# Patient Record
Sex: FEMALE | Race: WHITE | HISPANIC OR LATINO | Employment: FULL TIME | URBAN - METROPOLITAN AREA
[De-identification: names, ages, dates, MRNs, and addresses within clinical notes are randomized per-mention and may not be internally consistent; named-entity substitution may affect disease eponyms.]

---

## 2017-01-13 ENCOUNTER — OFFICE VISIT (OUTPATIENT)
Dept: INTERNAL MEDICINE | Facility: CLINIC | Age: 42
End: 2017-01-13
Attending: INTERNAL MEDICINE
Payer: COMMERCIAL

## 2017-01-13 VITALS
OXYGEN SATURATION: 99 % | DIASTOLIC BLOOD PRESSURE: 82 MMHG | HEIGHT: 60 IN | WEIGHT: 170.63 LBS | HEART RATE: 72 BPM | SYSTOLIC BLOOD PRESSURE: 118 MMHG | BODY MASS INDEX: 33.5 KG/M2

## 2017-01-13 DIAGNOSIS — E28.2 PCO (POLYCYSTIC OVARIES): ICD-10-CM

## 2017-01-13 DIAGNOSIS — R53.83 FATIGUE, UNSPECIFIED TYPE: ICD-10-CM

## 2017-01-13 DIAGNOSIS — E66.9 OBESITY (BMI 30.0-34.9): ICD-10-CM

## 2017-01-13 DIAGNOSIS — Z00.00 ANNUAL PHYSICAL EXAM: Primary | ICD-10-CM

## 2017-01-13 DIAGNOSIS — Z78.9 VEGETARIAN: ICD-10-CM

## 2017-01-13 DIAGNOSIS — D64.9 ANEMIA, UNSPECIFIED TYPE: ICD-10-CM

## 2017-01-13 DIAGNOSIS — M34.9 SCLERODERMA: ICD-10-CM

## 2017-01-13 PROCEDURE — 99999 PR PBB SHADOW E&M-EST. PATIENT-LVL III: CPT | Mod: PBBFAC,,, | Performed by: INTERNAL MEDICINE

## 2017-01-13 PROCEDURE — 99385 PREV VISIT NEW AGE 18-39: CPT | Mod: S$GLB,,, | Performed by: INTERNAL MEDICINE

## 2017-01-13 NOTE — PROGRESS NOTES
Subjective:       Patient ID: Liliana Wright is a 41 y.o. female.    Chief Complaint: Annual Exam    HPI   Pt here for annual exam and to [facility] care.     Reports inc stress from job and at home. Reports increased fatigue over past 3-6 months which she attributes to stress. She started taking b12 SL - dose unknown and iron once daily for fatigue - reports this has helped since started 1 week ago. Has also lost 10 pounds over the past month by healthier eating. She is a vegetarian and was worried her diet was lacking in B12 and iron.   Reports is having periods - these are regular.  Hx of anemia - Reports had blood transfusion in past after lost baby when pregnant.   Hx of scleroderma - followed by derm Dr. Stoddard for this.   Gyn is Dr. Jade    Review of Systems   Constitutional: Positive for fatigue. Negative for chills and fever.   HENT: Negative for rhinorrhea and sore throat.    Eyes: Negative for pain and visual disturbance.   Respiratory: Negative for cough and shortness of breath.    Cardiovascular: Negative for chest pain and leg swelling.   Gastrointestinal: Negative for abdominal pain and diarrhea.   Endocrine: Negative for cold intolerance and heat intolerance.   Genitourinary: Negative for dysuria and hematuria.   Musculoskeletal: Negative for arthralgias and joint swelling.   Skin: Negative for color change and rash.   Neurological: Negative for dizziness and headaches.   Hematological: Negative for adenopathy. Does not bruise/bleed easily.   Psychiatric/Behavioral: Negative for sleep disturbance. The patient is not nervous/anxious.        Objective:      Physical Exam   Constitutional: She is oriented to person, place, and time. She appears well-developed and well-nourished.   HENT:   Head: Normocephalic and atraumatic.   Right Ear: External ear normal.   Left Ear: External ear normal.   Nose: Nose normal.   Mouth/Throat: Oropharynx is clear and moist. No oropharyngeal exudate.   No carotid bruits   Eyes:  Conjunctivae and EOM are normal.   Neck: Neck supple. No thyromegaly present.   Cardiovascular: Normal rate, regular rhythm, normal heart sounds and intact distal pulses.    Pulmonary/Chest: Effort normal and breath sounds normal.   Abdominal: Soft. Bowel sounds are normal.   Musculoskeletal: She exhibits no edema or tenderness.   Lymphadenopathy:     She has no cervical adenopathy.   Neurological: She is alert and oriented to person, place, and time. Coordination normal.   Skin: Skin is warm and dry.   Psychiatric: She has a normal mood and affect. Her behavior is normal. Judgment and thought content normal.       Assessment:       Annual physical exam  -     CBC auto differential; Future; Expected date: 1/13/17  -     Comprehensive metabolic panel; Future; Expected date: 1/13/17  -     TSH; Future; Expected date: 1/13/17  -     Lipid panel; Future; Expected date: 1/13/17  -     Hemoglobin A1c; Future; Expected date: 1/13/17  Recommend daily sunscreen, cardiovascular exercise min 30 min 5 days per week. Seatbelts routinely.    PCO (polycystic ovaries): followed by gyn  -     Hemoglobin A1c; Future; Expected date: 1/13/17    Scleroderma: stable, followed by derm    Obesity (BMI 30.0-34.9):   - cont diet and exercise  - increase intensity and duration of CV exercise to continue weight loss  - goal wt loss one pound per week  - portion control, healthy choices    Anemia, unspecified type: check labs - is vegetarian. Will cont b12 and iron supplements she started 1 week ago for now  -     Ferritin; Future; Expected date: 1/13/17  -     Iron and TIBC; Future; Expected date: 1/13/17  -     Folate; Future; Expected date: 1/13/17  -     Vitamin B12; Future; Expected date: 1/13/17    Fatigue, unspecified type: rec good sleep hygiene and graded exercise program. If does not improve and labs unremarkable will consider further eval by sleep  -     Vitamin D; Future; Expected date: 1/13/17    Vegetarian; as above  HM: declines  vaccines

## 2017-01-14 ENCOUNTER — LAB VISIT (OUTPATIENT)
Dept: LAB | Facility: HOSPITAL | Age: 42
End: 2017-01-14
Attending: INTERNAL MEDICINE
Payer: COMMERCIAL

## 2017-01-14 DIAGNOSIS — Z00.00 ANNUAL PHYSICAL EXAM: ICD-10-CM

## 2017-01-14 DIAGNOSIS — D64.9 ANEMIA, UNSPECIFIED TYPE: ICD-10-CM

## 2017-01-14 DIAGNOSIS — E28.2 PCO (POLYCYSTIC OVARIES): ICD-10-CM

## 2017-01-14 DIAGNOSIS — R53.83 FATIGUE, UNSPECIFIED TYPE: ICD-10-CM

## 2017-01-14 LAB
ALBUMIN SERPL BCP-MCNC: 4.1 G/DL
ALP SERPL-CCNC: 81 U/L
ALT SERPL W/O P-5'-P-CCNC: 47 U/L
ANION GAP SERPL CALC-SCNC: 9 MMOL/L
AST SERPL-CCNC: 33 U/L
BASOPHILS # BLD AUTO: 0.01 K/UL
BASOPHILS NFR BLD: 0.2 %
BILIRUB SERPL-MCNC: 0.4 MG/DL
BUN SERPL-MCNC: 7 MG/DL
CALCIUM SERPL-MCNC: 9.7 MG/DL
CHLORIDE SERPL-SCNC: 109 MMOL/L
CHOLEST/HDLC SERPL: 5.7 {RATIO}
CO2 SERPL-SCNC: 23 MMOL/L
CREAT SERPL-MCNC: 0.8 MG/DL
DIFFERENTIAL METHOD: ABNORMAL
EOSINOPHIL # BLD AUTO: 0 K/UL
EOSINOPHIL NFR BLD: 0.8 %
ERYTHROCYTE [DISTWIDTH] IN BLOOD BY AUTOMATED COUNT: 14.3 %
EST. GFR  (AFRICAN AMERICAN): >60 ML/MIN/1.73 M^2
EST. GFR  (NON AFRICAN AMERICAN): >60 ML/MIN/1.73 M^2
GLUCOSE SERPL-MCNC: 91 MG/DL
HCT VFR BLD AUTO: 38.5 %
HDL/CHOLESTEROL RATIO: 17.5 %
HDLC SERPL-MCNC: 189 MG/DL
HDLC SERPL-MCNC: 33 MG/DL
HGB BLD-MCNC: 12.4 G/DL
IRON SERPL-MCNC: 63 UG/DL
LDLC SERPL CALC-MCNC: 128.4 MG/DL
LYMPHOCYTES # BLD AUTO: 1.8 K/UL
LYMPHOCYTES NFR BLD: 36.1 %
MCH RBC QN AUTO: 26.9 PG
MCHC RBC AUTO-ENTMCNC: 32.2 %
MCV RBC AUTO: 84 FL
MONOCYTES # BLD AUTO: 0.2 K/UL
MONOCYTES NFR BLD: 3.6 %
NEUTROPHILS # BLD AUTO: 3 K/UL
NEUTROPHILS NFR BLD: 59.3 %
NONHDLC SERPL-MCNC: 156 MG/DL
PLATELET # BLD AUTO: 252 K/UL
PMV BLD AUTO: 10.2 FL
POTASSIUM SERPL-SCNC: 4.3 MMOL/L
PROT SERPL-MCNC: 7.6 G/DL
RBC # BLD AUTO: 4.61 M/UL
SATURATED IRON: 18 %
SODIUM SERPL-SCNC: 141 MMOL/L
TOTAL IRON BINDING CAPACITY: 342 UG/DL
TRANSFERRIN SERPL-MCNC: 231 MG/DL
TRIGL SERPL-MCNC: 138 MG/DL
TSH SERPL DL<=0.005 MIU/L-ACNC: 0.41 UIU/ML
WBC # BLD AUTO: 5.07 K/UL

## 2017-01-14 PROCEDURE — 82746 ASSAY OF FOLIC ACID SERUM: CPT

## 2017-01-14 PROCEDURE — 82306 VITAMIN D 25 HYDROXY: CPT

## 2017-01-14 PROCEDURE — 82607 VITAMIN B-12: CPT

## 2017-01-14 PROCEDURE — 83540 ASSAY OF IRON: CPT

## 2017-01-14 PROCEDURE — 84443 ASSAY THYROID STIM HORMONE: CPT

## 2017-01-14 PROCEDURE — 80061 LIPID PANEL: CPT

## 2017-01-14 PROCEDURE — 36415 COLL VENOUS BLD VENIPUNCTURE: CPT

## 2017-01-14 PROCEDURE — 80053 COMPREHEN METABOLIC PANEL: CPT

## 2017-01-14 PROCEDURE — 82728 ASSAY OF FERRITIN: CPT

## 2017-01-14 PROCEDURE — 83036 HEMOGLOBIN GLYCOSYLATED A1C: CPT

## 2017-01-14 PROCEDURE — 85025 COMPLETE CBC W/AUTO DIFF WBC: CPT

## 2017-01-15 LAB
25(OH)D3+25(OH)D2 SERPL-MCNC: 14 NG/ML
FOLATE SERPL-MCNC: 13.4 NG/ML
VIT B12 SERPL-MCNC: 611 PG/ML

## 2017-01-16 LAB
ESTIMATED AVG GLUCOSE: 100 MG/DL
FERRITIN SERPL-MCNC: 65 NG/ML
HBA1C MFR BLD HPLC: 5.1 %

## 2017-01-17 ENCOUNTER — TELEPHONE (OUTPATIENT)
Dept: INTERNAL MEDICINE | Facility: CLINIC | Age: 42
End: 2017-01-17

## 2017-01-17 ENCOUNTER — PATIENT MESSAGE (OUTPATIENT)
Dept: INTERNAL MEDICINE | Facility: CLINIC | Age: 42
End: 2017-01-17

## 2017-01-17 DIAGNOSIS — E03.8 SUBCLINICAL HYPOTHYROIDISM: ICD-10-CM

## 2017-01-17 DIAGNOSIS — E55.9 VITAMIN D DEFICIENCY: ICD-10-CM

## 2017-01-17 DIAGNOSIS — R74.01 TRANSAMINITIS: Primary | ICD-10-CM

## 2017-01-17 RX ORDER — ERGOCALCIFEROL 1.25 MG/1
50000 CAPSULE ORAL
Qty: 12 CAPSULE | Refills: 0 | Status: SHIPPED | OUTPATIENT
Start: 2017-01-17 | End: 2017-04-05

## 2017-01-18 NOTE — TELEPHONE ENCOUNTER
Message sent to pt via my chart with lab results and updates to plan.     Please schedule vit d level in 3 months.     Please schedule tsh and lft's in 4-6 weeks.

## 2017-01-19 ENCOUNTER — PATIENT MESSAGE (OUTPATIENT)
Dept: INTERNAL MEDICINE | Facility: CLINIC | Age: 42
End: 2017-01-19

## 2017-03-04 ENCOUNTER — LAB VISIT (OUTPATIENT)
Dept: LAB | Facility: HOSPITAL | Age: 42
End: 2017-03-04
Attending: INTERNAL MEDICINE
Payer: COMMERCIAL

## 2017-03-04 DIAGNOSIS — E03.8 SUBCLINICAL HYPOTHYROIDISM: ICD-10-CM

## 2017-03-04 DIAGNOSIS — R74.01 TRANSAMINITIS: ICD-10-CM

## 2017-03-04 LAB
ALBUMIN SERPL BCP-MCNC: 3.7 G/DL
ALP SERPL-CCNC: 81 U/L
ALT SERPL W/O P-5'-P-CCNC: 47 U/L
AST SERPL-CCNC: 29 U/L
BILIRUB DIRECT SERPL-MCNC: 0.1 MG/DL
BILIRUB SERPL-MCNC: 0.3 MG/DL
PROT SERPL-MCNC: 7.2 G/DL
TSH SERPL DL<=0.005 MIU/L-ACNC: 0.76 UIU/ML

## 2017-03-04 PROCEDURE — 36415 COLL VENOUS BLD VENIPUNCTURE: CPT

## 2017-03-04 PROCEDURE — 80076 HEPATIC FUNCTION PANEL: CPT

## 2017-03-04 PROCEDURE — 84443 ASSAY THYROID STIM HORMONE: CPT

## 2017-03-07 ENCOUNTER — PATIENT MESSAGE (OUTPATIENT)
Dept: INTERNAL MEDICINE | Facility: CLINIC | Age: 42
End: 2017-03-07

## 2017-03-08 ENCOUNTER — TELEPHONE (OUTPATIENT)
Dept: INTERNAL MEDICINE | Facility: CLINIC | Age: 42
End: 2017-03-08

## 2017-03-08 DIAGNOSIS — R74.01 TRANSAMINITIS: Primary | ICD-10-CM

## 2017-03-08 NOTE — TELEPHONE ENCOUNTER
Message sent to pt via my chart with lab results and updates to plan.   Please schedule pt for HIV and hep panel. Thanks!

## 2017-03-08 NOTE — TELEPHONE ENCOUNTER
Hi, your liver labs are normal except for a mild elevation in one of the liver enzymes which is stable when compared to prior labs. I recommend checking hepatitis and HIV labs (which are routine when evaluating this). My office will contact you to schedule this. If these labs are negative, then I recommend monitoring this with annual labs in 1 year. Please let me know if you have any questions at all. PG

## 2017-03-09 ENCOUNTER — LAB VISIT (OUTPATIENT)
Dept: LAB | Facility: HOSPITAL | Age: 42
End: 2017-03-09
Attending: INTERNAL MEDICINE
Payer: COMMERCIAL

## 2017-03-09 DIAGNOSIS — R74.01 TRANSAMINITIS: ICD-10-CM

## 2017-03-09 PROCEDURE — 36415 COLL VENOUS BLD VENIPUNCTURE: CPT

## 2017-03-09 PROCEDURE — 80074 ACUTE HEPATITIS PANEL: CPT

## 2017-03-09 PROCEDURE — 86703 HIV-1/HIV-2 1 RESULT ANTBDY: CPT

## 2017-03-10 ENCOUNTER — PATIENT MESSAGE (OUTPATIENT)
Dept: INTERNAL MEDICINE | Facility: CLINIC | Age: 42
End: 2017-03-10

## 2017-03-10 LAB
HAV IGM SERPL QL IA: NEGATIVE
HBV CORE IGM SERPL QL IA: NEGATIVE
HBV SURFACE AG SERPL QL IA: NEGATIVE
HCV AB SERPL QL IA: NEGATIVE
HIV 1+2 AB+HIV1 P24 AG SERPL QL IA: NEGATIVE

## 2017-04-07 ENCOUNTER — TELEPHONE (OUTPATIENT)
Dept: OBSTETRICS AND GYNECOLOGY | Facility: CLINIC | Age: 42
End: 2017-04-07

## 2017-04-07 ENCOUNTER — PATIENT MESSAGE (OUTPATIENT)
Dept: OBSTETRICS AND GYNECOLOGY | Facility: CLINIC | Age: 42
End: 2017-04-07

## 2017-04-07 DIAGNOSIS — Z12.31 VISIT FOR SCREENING MAMMOGRAM: Primary | ICD-10-CM

## 2017-04-17 ENCOUNTER — HOSPITAL ENCOUNTER (OUTPATIENT)
Dept: RADIOLOGY | Facility: HOSPITAL | Age: 42
Discharge: HOME | End: 2017-04-17
Attending: OBSTETRICS & GYNECOLOGY
Payer: COMMERCIAL

## 2017-04-17 DIAGNOSIS — Z12.31 VISIT FOR SCREENING MAMMOGRAM: ICD-10-CM

## 2017-04-17 PROCEDURE — 77067 SCR MAMMO BI INCL CAD: CPT | Mod: TC

## 2017-04-17 PROCEDURE — 77063 BREAST TOMOSYNTHESIS BI: CPT | Mod: 26,,, | Performed by: RADIOLOGY

## 2017-04-17 PROCEDURE — 77067 SCR MAMMO BI INCL CAD: CPT | Mod: 26,,, | Performed by: RADIOLOGY

## 2017-04-22 ENCOUNTER — LAB VISIT (OUTPATIENT)
Dept: LAB | Facility: HOSPITAL | Age: 42
End: 2017-04-22
Attending: INTERNAL MEDICINE
Payer: COMMERCIAL

## 2017-04-22 DIAGNOSIS — E55.9 VITAMIN D DEFICIENCY: ICD-10-CM

## 2017-04-22 PROCEDURE — 36415 COLL VENOUS BLD VENIPUNCTURE: CPT

## 2017-04-22 PROCEDURE — 82306 VITAMIN D 25 HYDROXY: CPT

## 2017-04-23 LAB — 25(OH)D3+25(OH)D2 SERPL-MCNC: 23 NG/ML

## 2017-04-24 ENCOUNTER — TELEPHONE (OUTPATIENT)
Dept: INTERNAL MEDICINE | Facility: CLINIC | Age: 42
End: 2017-04-24

## 2017-04-24 DIAGNOSIS — E55.9 VITAMIN D DEFICIENCY: Primary | ICD-10-CM

## 2017-04-24 RX ORDER — ERGOCALCIFEROL 1.25 MG/1
50000 CAPSULE ORAL
Qty: 12 CAPSULE | Refills: 1 | Status: SHIPPED | OUTPATIENT
Start: 2017-04-24 | End: 2017-07-11

## 2017-04-24 NOTE — TELEPHONE ENCOUNTER
Left a message on pt's voicemail to return call to office in regards to scheduling repeat vitamin D labs.

## 2017-04-24 NOTE — TELEPHONE ENCOUNTER
Message sent to pt via my chart with lab results and updates to plan.   Schedule vit d level in 6 months

## 2017-04-25 NOTE — TELEPHONE ENCOUNTER
----- Message from Yeimi Brown sent at 4/24/2017  3:24 PM CDT -----  Contact: BRETT HOOPER [[id number]]  x_  1st Request  _  2nd Request  _  3rd Request        Who: BRETT HOOPER [[id number]]    Why: pt is returning clinical staff phone call. Thanks!    What Number to Call Back: [phone]    When to Expect a call back: (Before the end of the day)   -- if the call is after 12:00, the call back will be tomorrow.

## 2017-04-26 ENCOUNTER — HOSPITAL ENCOUNTER (OUTPATIENT)
Dept: RADIOLOGY | Facility: OTHER | Age: 42
Discharge: HOME | End: 2017-04-26
Attending: INTERNAL MEDICINE
Payer: COMMERCIAL

## 2017-04-26 ENCOUNTER — PATIENT MESSAGE (OUTPATIENT)
Dept: INTERNAL MEDICINE | Facility: CLINIC | Age: 42
End: 2017-04-26

## 2017-04-26 ENCOUNTER — OFFICE VISIT (OUTPATIENT)
Dept: INTERNAL MEDICINE | Facility: CLINIC | Age: 42
End: 2017-04-26
Attending: INTERNAL MEDICINE
Payer: COMMERCIAL

## 2017-04-26 VITALS
SYSTOLIC BLOOD PRESSURE: 100 MMHG | BODY MASS INDEX: 33.55 KG/M2 | WEIGHT: 170.88 LBS | HEIGHT: 60 IN | OXYGEN SATURATION: 99 % | HEART RATE: 69 BPM | DIASTOLIC BLOOD PRESSURE: 72 MMHG

## 2017-04-26 DIAGNOSIS — G89.29 CHRONIC PAIN OF LEFT ANKLE: ICD-10-CM

## 2017-04-26 DIAGNOSIS — M25.572 CHRONIC PAIN OF LEFT ANKLE: Primary | ICD-10-CM

## 2017-04-26 DIAGNOSIS — M25.572 CHRONIC PAIN OF LEFT ANKLE: ICD-10-CM

## 2017-04-26 DIAGNOSIS — G89.29 CHRONIC PAIN OF LEFT ANKLE: Primary | ICD-10-CM

## 2017-04-26 PROCEDURE — 73610 X-RAY EXAM OF ANKLE: CPT | Mod: 26,LT,, | Performed by: RADIOLOGY

## 2017-04-26 PROCEDURE — 1160F RVW MEDS BY RX/DR IN RCRD: CPT | Mod: S$GLB,,, | Performed by: INTERNAL MEDICINE

## 2017-04-26 PROCEDURE — 73610 X-RAY EXAM OF ANKLE: CPT | Mod: TC,LT

## 2017-04-26 PROCEDURE — 99213 OFFICE O/P EST LOW 20 MIN: CPT | Mod: S$GLB,,, | Performed by: INTERNAL MEDICINE

## 2017-04-26 PROCEDURE — 99999 PR PBB SHADOW E&M-EST. PATIENT-LVL III: CPT | Mod: PBBFAC,,, | Performed by: INTERNAL MEDICINE

## 2017-04-26 RX ORDER — CHOLECALCIFEROL (VITAMIN D3) 25 MCG
185 TABLET ORAL DAILY
COMMUNITY
End: 2017-05-17 | Stop reason: ALTCHOICE

## 2017-04-26 NOTE — PATIENT INSTRUCTIONS
Your test results will be communicated to you via: [facility], Telephone or Letter.  If you have not received your test results within one week. Please contact the clinic at [phone].      Take aleve every 12 hours with food and zantac for 7-14 days.   Ankle brace  Rest, ice, elevate leg when resting

## 2017-04-27 ENCOUNTER — PATIENT MESSAGE (OUTPATIENT)
Dept: INTERNAL MEDICINE | Facility: CLINIC | Age: 42
End: 2017-04-27

## 2017-04-27 NOTE — PROGRESS NOTES
Subjective:       Patient ID: Liliana Wright is a 42 y.o. female.    Chief Complaint: Foot Swelling (left ankle swelling)    HPI   Pt here for left ankle tenderness and swelling.   Reports broke ankle in 1992 and since then is achy periodically crystal when weather changes.   Reports went on trip a few weeks ago and went hiking. No falls, trauma or rolling ankle. Ankle has been tender at left lateral malleolus and swollen in this area.   No cyanosis, erythema, inc warmth, calf swelling or tenderness. No knee or hip or back pain.   Reports swelling worse after walking for long periods of time. Improves with elevating foot. Has not taken any OTC meds for this.   Able to bear wt. Has never had this eval by ortho since initial fracture years ago.     Review of Systems    Objective:      Physical Exam   Constitutional: She is oriented to person, place, and time. She appears well-developed and well-nourished.   HENT:   Head: Normocephalic and atraumatic.   Eyes: Conjunctivae and EOM are normal.   Neck: Neck supple.   Cardiovascular: Normal rate, regular rhythm, normal heart sounds and intact distal pulses.    Pulmonary/Chest: Effort normal and breath sounds normal.   Abdominal: Normal appearance.   Musculoskeletal: Normal range of motion. She exhibits edema and tenderness.   Left ankle, knee with FROM  + ttp inf to left lateral malleolus with mild edema in this area  No erythema, inc warmth, cyanosis  No calf ttp, warmth, redness, or edema     Lymphadenopathy:     She has no cervical adenopathy.   Neurological: She is alert and oriented to person, place, and time. She has normal strength. Gait normal.   Skin: Skin is warm, dry and intact. No cyanosis. Nails show no clubbing.   Psychiatric: She has a normal mood and affect. Her speech is normal and behavior is normal. Judgment and thought content normal. Cognition and memory are normal.       Assessment:       1. Chronic pain of left ankle        Plan:       Liliana was seen  today for foot swelling.    Diagnoses and all orders for this visit:    Chronic pain of left ankle: suspect ankle sprain - rec RICE therapy - trial of scheduled otc aleve bid with food and zantac for 1-2 weeks. If no improvement or resolution of symptoms then will keep appt with ortho in 3-4 weeks. Er and rtc prompts given  -     X-Ray Ankle Complete 3 View Left; Future  -     Ambulatory Referral to Orthopedics    Other orders  -     ranitidine (ZANTAC 75) 75 MG tablet; Take 1 tablet (75 mg total) by mouth 2 (two) times daily.

## 2017-04-27 NOTE — TELEPHONE ENCOUNTER
Please notify pt that since she had not tried aleve otc we discussed starting a trial of scheduled otc aleve bid with food and zantac for 1-2 weeks - both meds are OTC.

## 2017-04-27 NOTE — TELEPHONE ENCOUNTER
Pt's pharmacy states that they have not received a prescription for the pt's Biotin and Ranitidine sent on 4/26/2017. Please Advise LCV 4/27/2017

## 2017-05-01 ENCOUNTER — HOSPITAL ENCOUNTER (EMERGENCY)
Facility: OTHER | Age: 42
Discharge: HOME | End: 2017-05-01
Attending: EMERGENCY MEDICINE
Payer: COMMERCIAL

## 2017-05-01 VITALS
TEMPERATURE: 98 F | BODY MASS INDEX: 32.79 KG/M2 | HEIGHT: 60 IN | HEART RATE: 90 BPM | WEIGHT: 167 LBS | DIASTOLIC BLOOD PRESSURE: 79 MMHG | OXYGEN SATURATION: 100 % | RESPIRATION RATE: 17 BRPM | SYSTOLIC BLOOD PRESSURE: 109 MMHG

## 2017-05-01 DIAGNOSIS — R00.2 PALPITATION: ICD-10-CM

## 2017-05-01 DIAGNOSIS — R00.2 PALPITATIONS: Primary | ICD-10-CM

## 2017-05-01 DIAGNOSIS — R20.2 PARESTHESIA: ICD-10-CM

## 2017-05-01 LAB
ALBUMIN SERPL BCP-MCNC: 4.2 G/DL
ALP SERPL-CCNC: 78 U/L
ALT SERPL W/O P-5'-P-CCNC: 30 U/L
ANION GAP SERPL CALC-SCNC: 13 MMOL/L
AST SERPL-CCNC: 26 U/L
B-HCG UR QL: NEGATIVE
BASOPHILS # BLD AUTO: 0.02 K/UL
BASOPHILS NFR BLD: 0.3 %
BILIRUB SERPL-MCNC: 0.4 MG/DL
BILIRUB UR QL STRIP: NEGATIVE
BUN SERPL-MCNC: 7 MG/DL
CALCIUM SERPL-MCNC: 9.3 MG/DL
CHLORIDE SERPL-SCNC: 102 MMOL/L
CLARITY UR: CLEAR
CO2 SERPL-SCNC: 21 MMOL/L
COLOR UR: YELLOW
CREAT SERPL-MCNC: 0.8 MG/DL
CTP QC/QA: YES
DIFFERENTIAL METHOD: ABNORMAL
EOSINOPHIL # BLD AUTO: 0 K/UL
EOSINOPHIL NFR BLD: 0.1 %
ERYTHROCYTE [DISTWIDTH] IN BLOOD BY AUTOMATED COUNT: 13.7 %
EST. GFR  (AFRICAN AMERICAN): >60 ML/MIN/1.73 M^2
EST. GFR  (NON AFRICAN AMERICAN): >60 ML/MIN/1.73 M^2
GLUCOSE SERPL-MCNC: 106 MG/DL
GLUCOSE UR QL STRIP: NEGATIVE
HCT VFR BLD AUTO: 37.3 %
HGB BLD-MCNC: 12.4 G/DL
HGB UR QL STRIP: NEGATIVE
KETONES UR QL STRIP: NEGATIVE
LEUKOCYTE ESTERASE UR QL STRIP: NEGATIVE
LYMPHOCYTES # BLD AUTO: 2.1 K/UL
LYMPHOCYTES NFR BLD: 28.6 %
MAGNESIUM SERPL-MCNC: 1.8 MG/DL
MCH RBC QN AUTO: 27.3 PG
MCHC RBC AUTO-ENTMCNC: 33.2 %
MCV RBC AUTO: 82 FL
MONOCYTES # BLD AUTO: 0.2 K/UL
MONOCYTES NFR BLD: 2.9 %
NEUTROPHILS # BLD AUTO: 5 K/UL
NEUTROPHILS NFR BLD: 67.8 %
NITRITE UR QL STRIP: NEGATIVE
PH UR STRIP: 6 [PH] (ref 5–8)
PLATELET # BLD AUTO: 278 K/UL
PMV BLD AUTO: 9.9 FL
POTASSIUM SERPL-SCNC: 3.5 MMOL/L
PROT SERPL-MCNC: 8.1 G/DL
PROT UR QL STRIP: NEGATIVE
RBC # BLD AUTO: 4.54 M/UL
SODIUM SERPL-SCNC: 136 MMOL/L
SP GR UR STRIP: <=1.005 (ref 1–1.03)
TSH SERPL DL<=0.005 MIU/L-ACNC: 1.36 UIU/ML
URN SPEC COLLECT METH UR: ABNORMAL
UROBILINOGEN UR STRIP-ACNC: NEGATIVE EU/DL
WBC # BLD AUTO: 7.33 K/UL

## 2017-05-01 PROCEDURE — 96360 HYDRATION IV INFUSION INIT: CPT

## 2017-05-01 PROCEDURE — 84443 ASSAY THYROID STIM HORMONE: CPT

## 2017-05-01 PROCEDURE — 80053 COMPREHEN METABOLIC PANEL: CPT

## 2017-05-01 PROCEDURE — 81003 URINALYSIS AUTO W/O SCOPE: CPT

## 2017-05-01 PROCEDURE — 81025 URINE PREGNANCY TEST: CPT | Performed by: EMERGENCY MEDICINE

## 2017-05-01 PROCEDURE — 93010 ELECTROCARDIOGRAM REPORT: CPT | Mod: ,,, | Performed by: INTERNAL MEDICINE

## 2017-05-01 PROCEDURE — 25000003 PHARM REV CODE 250: Performed by: PHYSICIAN ASSISTANT

## 2017-05-01 PROCEDURE — 99284 EMERGENCY DEPT VISIT MOD MDM: CPT | Mod: 25

## 2017-05-01 PROCEDURE — 93005 ELECTROCARDIOGRAM TRACING: CPT

## 2017-05-01 PROCEDURE — 83735 ASSAY OF MAGNESIUM: CPT

## 2017-05-01 PROCEDURE — 85025 COMPLETE CBC W/AUTO DIFF WBC: CPT

## 2017-05-01 RX ORDER — SODIUM CHLORIDE 9 MG/ML
1000 INJECTION, SOLUTION INTRAVENOUS
Status: COMPLETED | OUTPATIENT
Start: 2017-05-01 | End: 2017-05-01

## 2017-05-01 RX ORDER — NAPROXEN SODIUM 220 MG
220 TABLET ORAL
COMMUNITY
End: 2017-05-17 | Stop reason: ALTCHOICE

## 2017-05-01 RX ADMIN — SODIUM CHLORIDE 1000 ML: 0.9 INJECTION, SOLUTION INTRAVENOUS at 02:05

## 2017-05-01 NOTE — ED AVS SNAPSHOT
[facility]  [address]  [place] [address]               Liliana Wright   2017  1:23 PM   ED    Description:  Female : 1975   Department:  [facility]           Your Care was Coordinated By:     Provider Role From To    Lico Bang MD Attending Provider 17 0427 --    Bonnie Delgado PA-C Physician Assistant 17 [id number] --      Reason for Visit     Palpitations           Diagnoses this Visit        Comments    Palpitations    -  Primary     Palpitation         Paresthesia           ED Disposition     None           To Do List           Follow-up Information     Follow up with Jenna Scott MD In 2 days.    Specialty:  Internal Medicine    Why:  For symptom re-check.     Contact information:    [address]  [place] [address]  [phone]        [facility]     [facility] Care Line -  Assistance  Unless otherwise directed by your provider, please contact [facility], our nurse care line that is available for  assistance.     Registered nurses in the [facility] provide: appointment scheduling, clinical advisement, health education, and other advisory services.  Call: [phone] (toll free)               Medications           Message regarding Medications     Verify the changes and/or additions to your medication regime listed below are the same as discussed with your clinician today.  If any of these changes or additions are incorrect, please notify your healthcare provider.        These medications were administered today        Dose Freq    0.9%  NaCl infusion 1,000 mL ED 1 Time    Sig: Inject 1,000 mLs into the vein ED 1 Time.    Class: Normal    Route: Intravenous           Verify that the below list of medications is an accurate representation of the medications you are currently taking.  If none reported, the list may be blank. If incorrect, please contact your  healthcare provider. Carry this list with you in case of emergency.           Current Medications     naproxen sodium (ANAPROX) 220 MG tablet Take 220 mg by mouth every 12 (twelve) hours.    biotin 300 mcg Tab Take 1 tablet by mouth once daily.    ergocalciferol (ERGOCALCIFEROL) 50,000 unit Cap Take 1 capsule (50,000 Units total) by mouth every 7 days.    ferrous gluconate (FERGON) 325 MG Tab Take 325 mg by mouth daily with breakfast.    hydroquinone 4 % Crea Apply topically 2 (two) times daily.    ranitidine (ZANTAC 75) 75 MG tablet Take 1 tablet (75 mg total) by mouth 2 (two) times daily.    vitamin D 1000 units Tab Take 185 mg by mouth once daily.           Clinical Reference Information           Your Vitals Were     BP Pulse Temp Resp Height Weight    114/60 92 97.4 °F (36.3 °C) (Oral) 16 5' (1.524 m) 75.8 kg (167 lb)    Last Period SpO2 BMI          04/19/2017 100% 32.61 kg/m2        Allergies as of 5/1/2017     No Known Allergies      Immunizations Administered on Date of Encounter - 5/1/2017     None      ED Micro, Lab, POCT     Start Ordered       Status Ordering Provider    05/01/17 1406 05/01/17 1405  POCT urine pregnancy  Once      Final result     05/01/17 1357 05/01/17 1356  Magnesium  Add-on      Completed     05/01/17 1340 05/01/17 1339  CBC auto differential  STAT      Final result     05/01/17 1340 05/01/17 1339  Comprehensive metabolic panel  STAT      Final result     05/01/17 1340 05/01/17 1339  TSH  STAT      Final result     05/01/17 1340 05/01/17 1339  Urinalysis  STAT      Final result     05/01/17 1339 05/01/17 1339  Magnesium  Once      Final result       ED Imaging Orders     None        Discharge Instructions         Paraesthesias  Paraesthesia is a burning or prickling sensation that is sometimes felt in the hands, arms, legs or feet. It can also occur in other parts of the body. It can also feel like tingling or numbness, skin crawling, or itching. The feeling is not comfortable, but  "it is not painful. (The "pins and needles" feeling that happens when a foot or hand "falls asleep" is a temporary paraesthesia.)  Paraesthesias that last or come and go may be caused by medical issues that need to be treated. These include stroke, a bulging disk pressing on a nerve, a trapped nerve, vitamin deficiencies, or even certain medicines.  Tests are often done. These tests may include blood tests, X-ray, CT (computerized tomography) scan, or a muscle test (electromyography). Depending on the cause, treatment may include physical therapy.  Home care  · Tell the healthcare provider about all medicines you take. This includes prescription and over-the-counter medicines, vitamins, and herbs. Ask if any of the medicines may be causing your problems. Do not make any changes to prescription medicines without talking to your healthcare provider first.  · You may be prescribed medicines to help relieve the tingling feeling or for pain. Take all medicines as directed.  · A numb hand or foot may be more prone to injury. To help protect it:  ¨ Always use oven mitts.  ¨ Test water with an unaffected hand or foot.  ¨ Use caution when trimming nails. File sharp areas.  ¨ Wear shoes that fit well to avoid pressure points, blisters, and ulcers.  ¨ Inspect your hands and feet carefully (including the soles of your feet and between your toes) at least once a week. If you see red areas, sores, or other problems, tell your healthcare provider.  Follow-up care  Follow up with your doctor or as advised by our staff. You may need further testing or evaluation.  When to seek medical advice  Call your healthcare provider right away if any of the following occur:  · Numbness or weakness of the face, one arm, or one leg  · Slurred speech, confusion, trouble speaking, walking, or seeing  · Severe headache, fainting spell, dizziness, or seizure  · Chest, arm, neck, or upper back pain  · Loss of bladder or bowel control  · Open wound " "with redness, swelling, or pus  Date Last Reviewed: 9/25/2015  © 2676-7947 [employer]. [address] [place] [address], [place] [address]. All rights reserved. This information is not intended as a substitute for professional medical care. Always follow your healthcare professional's instructions.          Heart Palpitations    Palpitations are the feeling that your heart is beating hard, fast, or irregular. Some describe it as "pounding" or "skipped beats." Palpitations may occur in someone with heart disease, but can also occur in a healthy person.  Heart-related causes:  · Arrhythmia (a change from the heart's normal rhythm)  · Heart valve disease  · Disease of the heart muscle  · Coronary artery disease  · High blood pressure  Non-heart-related causes:  · Certain medicines (such as asthma inhalers and decongestants)  · Some herbal supplements, energy drinks and pills, and weight loss pills  · Illegal stimulant drugs (such as cocaine, crank, methamphetamine, PCP, bath salts, ecstasy)  · Caffeine, alcohol, and tobacco  · Medical conditions such as thyroid disease, anemia, anxiety, and panic disorder  Sometimes the cause can't be found.  Home care  Follow these home care tips:  · Avoid excess caffeine, alcohol, tobacco, and any stimulant drugs.  · Tell your doctor about any prescription or over-the-counter or herbal medicines you take.  Follow-up care  · Follow up with your doctor, or as advised.  Call 911  This is the fastest and safest way to get to the emergency department. The paramedics can also begin treatment on the way to the hospital, if needed.  Don't wait until your symptoms are severe to call 911. These are reasons to call 911:  · Chest pain  · Shortness of breath  · Feeling lightheaded, faint, or dizzy  · Fainting or loss of consciousness  · Very irregular heartbeat  · Rapid heartbeat that makes you uncomfortable  · Slower than usual heart rate associated with symptoms  · Slower than usual heart " rate  · Chest pain with weakness, dizziness, heavy sweating, nausea, or vomiting  · Extreme drowsiness or confusion  · Weakness of an arm or leg, or on 1 side of the face  · Difficulty with speech or vision  When to seek medical advice  Get prompt medical attention if you have palpitations and any of the following:  · Weakness  · Dizziness  · Lightheadedness  · Fainting  Date Last Reviewed: 4/27/2016  © 1735-2123 [employer]. [address] [place] [address], [place] [address]. All rights reserved. This information is not intended as a substitute for professional medical care. Always follow your healthcare professional's instructions.          Your Scheduled Appointments     May 10, 2017 10:00 AM CDT   Consult with MANJULA Arguello - Orthopedics ([facility] )    [address]  [facility] [address]   [phone]            May 30, 2017  9:30 AM CDT   [facility] Established Patient with Ronaldo Jade IV, MD   [facility] [address] ([facility])    [address]  [facility] [address]   [phone]               [facility] complies with applicable Federal civil rights laws and does not discriminate on the basis of race, color, national origin, age, disability, or sex.        Language Assistance Services     ATTENTION: Language assistance services are available, free of charge. Please call [phone].      ATENCIÓN: Si habla español, tiene a logan disposición servicios gratuitos de asistencia lingüística. Llame al [phone].     CHÚ Ý: N?u b?n nói Ti?ng Vi?t, có các d?ch v? h? tr? ngôn ng? mi?n phí dành cho b?n. G?i s? [phone].

## 2017-05-01 NOTE — ED NOTES
Pt is resting comfortably, informed me  is on his way. Pt updated on POC. Comfort, position and restroom needs addressed. Bed remains in lowest position, side rails up x1, call light in reach. Pt instructed to call for assistance. Pt reports pain is still of 0/10.

## 2017-05-01 NOTE — ED PROVIDER NOTES
"Encounter Date: 2017       History     Chief Complaint   Patient presents with    Palpitations     Pt5 states feeling palpitations since 1000 this morning - has been on aleve since last week for a swollen ankle and states this morning started having a tingling sensation in left ankle     Review of patient's allergies indicates:  No Known Allergies  HPI Comments: Patient is 42 year old female history of scleroderma who presents with complaints of palpitations that started this morning suddenly. She reports symptoms started while at work, sitting at a desk. She reports feeling three "waves" of palpitations feeling like her chest is going to beat out of her chest. She reports at no time has she had chest pain or SOB. The only discomfort she feels is left ankle tingling. She admits that about 2 weeks ago while hiking she injured her ankle and has been managing discomfort with aleve and tylenol and an ankle brace. She reports swelling has significantly improved and that tylenol is managing her pain completely. She denies fever, chills, nausea, vomiting, diarrhea, bladder changes. She is currently unaccompanied in the ER.     The history is provided by the patient.     Past Medical History:   Diagnosis Date    Abnormal Pap smear     GEGE I on bx     Abnormal Pap smear of cervix     Cervical high risk HPV (human papillomavirus) test positive     History of ovarian cyst 2008    History of polycystic ovarian disease     insulin resistance    Scleroderma     followed by derm (Dr. Stoddard)     Past Surgical History:   Procedure Laterality Date     SECTION      X 2    CHOLECYSTECTOMY      DILATION AND CURETTAGE OF UTERUS  2014    missed AB     OVARIAN CYST REMOVAL      with c/s      Family History   Problem Relation Age of Onset    Lupus Father     No Known Problems Mother     Breast cancer Maternal Grandmother 70    No Known Problems Sister     No Known Problems Brother     No Known " Problems Daughter     No Known Problems Son     Alopecia Neg Hx     Diabetes Neg Hx     Hypertension Neg Hx     Heart disease Neg Hx     Stroke Neg Hx     Acne Neg Hx     Eczema Neg Hx     Psoriasis Neg Hx     Melanoma Neg Hx      Social History   Substance Use Topics    Smoking status: Never Smoker    Smokeless tobacco: Never Used    Alcohol use No     Review of Systems   Constitutional: Negative for chills and fever.   HENT: Negative for sore throat and trouble swallowing.    Eyes: Negative for visual disturbance.   Respiratory: Negative for cough and shortness of breath.    Cardiovascular: Positive for palpitations. Negative for chest pain.   Gastrointestinal: Negative for abdominal pain, constipation, diarrhea, nausea and vomiting.   Genitourinary: Negative for dysuria and flank pain.   Musculoskeletal: Negative for back pain, neck pain and neck stiffness.   Skin: Negative for rash.   Neurological: Negative for dizziness, syncope, weakness and headaches.        Left ankle tingling.   Psychiatric/Behavioral: Negative for confusion.       Physical Exam   Initial Vitals   BP Pulse Resp Temp SpO2   05/01/17 1316 05/01/17 1316 05/01/17 1316 05/01/17 1316 05/01/17 1316   133/73 114 20 97.4 °F (36.3 °C) 100 %     Physical Exam    Nursing note and vitals reviewed.  Constitutional: She appears well-developed and well-nourished.   Healthy appearing female in NAD or apparent pain. She makes good eye contact and ambulates with ease. She does appear anxious during interview and exam.    HENT:   Head: Normocephalic and atraumatic.   Eyes: Conjunctivae and EOM are normal. Pupils are equal, round, and reactive to light. Right eye exhibits no discharge. Left eye exhibits no discharge. No scleral icterus.   Neck: Normal range of motion. Neck supple.   Cardiovascular: Normal rate, regular rhythm, normal heart sounds and intact distal pulses.    on my exam   Pulmonary/Chest: Breath sounds normal. She has no  wheezes. She has no rhonchi. She has no rales.   Abdominal: Soft. Bowel sounds are normal. There is no tenderness. There is no rebound and no guarding.   Musculoskeletal: Normal range of motion. She exhibits no edema or tenderness.   Left lower extremity has normal sensation to light touch. There is mild overlying edema to the lateral malleolus. There is no overlying erythema, ecchymosis or skin breakdown.     There is normal ROM and DP and PT pulse.     Right foot and ankle has normal exam.    Lymphadenopathy:     She has no cervical adenopathy.   Neurological: She is alert and oriented to person, place, and time. She has normal strength. No cranial nerve deficit or sensory deficit.   Skin: Skin is warm. No rash and no abscess noted. No erythema.   Psychiatric: She has a normal mood and affect. Her behavior is normal. Thought content normal.         ED Course   Procedures  Labs Reviewed - No data to display  EKG Readings: (Independently Interpreted)   Initial Reading: No STEMI. Rhythm: Sinus Tachycardia. Heart Rate: 102. Ectopy: No Ectopy. ST Segments: Normal ST Segments. T Waves: Normal. Axis: Normal.     Labs Reviewed   CBC W/ AUTO DIFFERENTIAL - Abnormal; Notable for the following:        Result Value    Mono # 0.2 (*)     Mono% 2.9 (*)     All other components within normal limits   COMPREHENSIVE METABOLIC PANEL - Abnormal; Notable for the following:     CO2 21 (*)     All other components within normal limits   URINALYSIS - Abnormal; Notable for the following:     Specific Gravity, UA <=1.005 (*)     All other components within normal limits   TSH   MAGNESIUM   MAGNESIUM   POCT URINE PREGNANCY          Medical Decision Making:   ED Management:  Urgent evaluation a 42-year-old female who presents with complaints of palpitations and left lower extremity paresthesias without clear etiology at this time.  She is afebrile, nontoxic appearing, hemodynamically stable and only mildly tachycardic with heart rate noted  to be 104 on my exam. ( on previous visit). Left lower extremity has normal exam apart from very mild lateral malleolus edema without tenderness to palpation and overlying skin changes.  Negative Homans sign.  Normal DP PT pulses bilaterally.  She has normal cardiopulmonary auscultation.  EKG reveals sinus tachycardia with heart rate of 102 with no ischemic or acute dysrhythmia changes. Diagnostic lab values reveal no leukocytosis or anemia.  No electrolyte abnormalities.  Thyroid-stimulating hormone within normal limits.  Pregnancy test is negative.magnesium within normal limits.  Urinalysis unremarkable for infection no concentrated. Patient is given a liter fluid here in the emergency department and reports no recurrence of sensation of palpitations.  She has occasional spikes of tachycardia on cardiac monitoring no this is when I go to discuss her case with her I feel that this could be related to anxiety.  At rest her heart rate is in the low 90s.  I do feel she is safe for discharge with strict instruction to follow-up with Dr. Scott.  I do not have a clear etiology for the paresthesias that she is feeling in her left lower extremity however in the setting of no range of motion deficits, strength deficits, pulse abnormalities or signs of fracture dislocation I do not feel that further diagnostics are warranted at this time.  I feel these paresthesias can be evaluated in the outpatient setting. On further interview with the patient she is concerned that she is sensitive to the NSAIDs and vitamin D supplementation she has been taking.  She asked me if it is okay for her to stop taking these medications.  I inform her that is okay for her to stop taking the NSAIDs altogether but she should discuss discontinuation of the vitamin D with her primary care provider.  She is made aware of signs and symptoms of worsening and is told if these present she return to the ER.  She is amenable to this plan.  Case  discussed with attending who agrees with plan.                    ED Course     Clinical Impression:   The primary encounter diagnosis was Palpitations. Diagnoses of Palpitation and Paresthesia were also pertinent to this visit.          Bonnie Delgado PA-C  05/01/17 3583

## 2017-05-01 NOTE — ED NOTES
Pt is resting comfortable. Pt updated on POC. Comfort, position and restroom needs addressed. Bed remains in lowest position, side rails up x1, call light in reach. Pt instructed to call for assistance. Pt reports pain of 0/10.

## 2017-05-01 NOTE — DISCHARGE INSTRUCTIONS
"  Paraesthesias  Paraesthesia is a burning or prickling sensation that is sometimes felt in the hands, arms, legs or feet. It can also occur in other parts of the body. It can also feel like tingling or numbness, skin crawling, or itching. The feeling is not comfortable, but it is not painful. (The "pins and needles" feeling that happens when a foot or hand "falls asleep" is a temporary paraesthesia.)  Paraesthesias that last or come and go may be caused by medical issues that need to be treated. These include stroke, a bulging disk pressing on a nerve, a trapped nerve, vitamin deficiencies, or even certain medicines.  Tests are often done. These tests may include blood tests, X-ray, CT (computerized tomography) scan, or a muscle test (electromyography). Depending on the cause, treatment may include physical therapy.  Home care  · Tell the healthcare provider about all medicines you take. This includes prescription and over-the-counter medicines, vitamins, and herbs. Ask if any of the medicines may be causing your problems. Do not make any changes to prescription medicines without talking to your healthcare provider first.  · You may be prescribed medicines to help relieve the tingling feeling or for pain. Take all medicines as directed.  · A numb hand or foot may be more prone to injury. To help protect it:  ¨ Always use oven mitts.  ¨ Test water with an unaffected hand or foot.  ¨ Use caution when trimming nails. File sharp areas.  ¨ Wear shoes that fit well to avoid pressure points, blisters, and ulcers.  ¨ Inspect your hands and feet carefully (including the soles of your feet and between your toes) at least once a week. If you see red areas, sores, or other problems, tell your healthcare provider.  Follow-up care  Follow up with your doctor or as advised by our staff. You may need further testing or evaluation.  When to seek medical advice  Call your healthcare provider right away if any of the following " "occur:  · Numbness or weakness of the face, one arm, or one leg  · Slurred speech, confusion, trouble speaking, walking, or seeing  · Severe headache, fainting spell, dizziness, or seizure  · Chest, arm, neck, or upper back pain  · Loss of bladder or bowel control  · Open wound with redness, swelling, or pus  Date Last Reviewed: 9/25/2015  © 7443-9217 [employer]. [address] [place] [address], [place] [address]. All rights reserved. This information is not intended as a substitute for professional medical care. Always follow your healthcare professional's instructions.          Heart Palpitations    Palpitations are the feeling that your heart is beating hard, fast, or irregular. Some describe it as "pounding" or "skipped beats." Palpitations may occur in someone with heart disease, but can also occur in a healthy person.  Heart-related causes:  · Arrhythmia (a change from the heart's normal rhythm)  · Heart valve disease  · Disease of the heart muscle  · Coronary artery disease  · High blood pressure  Non-heart-related causes:  · Certain medicines (such as asthma inhalers and decongestants)  · Some herbal supplements, energy drinks and pills, and weight loss pills  · Illegal stimulant drugs (such as cocaine, crank, methamphetamine, PCP, bath salts, ecstasy)  · Caffeine, alcohol, and tobacco  · Medical conditions such as thyroid disease, anemia, anxiety, and panic disorder  Sometimes the cause can't be found.  Home care  Follow these home care tips:  · Avoid excess caffeine, alcohol, tobacco, and any stimulant drugs.  · Tell your doctor about any prescription or over-the-counter or herbal medicines you take.  Follow-up care  · Follow up with your doctor, or as advised.  Call 911  This is the fastest and safest way to get to the emergency department. The paramedics can also begin treatment on the way to the hospital, if needed.  Don't wait until your symptoms are severe to call 911. These are reasons to call " 911:  · Chest pain  · Shortness of breath  · Feeling lightheaded, faint, or dizzy  · Fainting or loss of consciousness  · Very irregular heartbeat  · Rapid heartbeat that makes you uncomfortable  · Slower than usual heart rate associated with symptoms  · Slower than usual heart rate  · Chest pain with weakness, dizziness, heavy sweating, nausea, or vomiting  · Extreme drowsiness or confusion  · Weakness of an arm or leg, or on 1 side of the face  · Difficulty with speech or vision  When to seek medical advice  Get prompt medical attention if you have palpitations and any of the following:  · Weakness  · Dizziness  · Lightheadedness  · Fainting  Date Last Reviewed: 4/27/2016  © 4278-3433 [employer]. [address] [place] [address], [place] [address]. All rights reserved. This information is not intended as a substitute for professional medical care. Always follow your healthcare professional's instructions.

## 2017-05-04 ENCOUNTER — TELEPHONE (OUTPATIENT)
Dept: INTERNAL MEDICINE | Facility: CLINIC | Age: 42
End: 2017-05-04

## 2017-05-04 NOTE — TELEPHONE ENCOUNTER
Left a message on pt's voicemail to return call to office in regards to scheduling repeat vit D lab and pcp's rec.

## 2017-05-04 NOTE — TELEPHONE ENCOUNTER
----- Message from Yeimi Brown sent at 5/4/2017  9:16 AM CDT -----  Contact: BRETT HOOPER [[id number]]  _x  1st Request  _  2nd Request  _  3rd Request        Who: BRETT HOOPER [[id number]]    Why: pt is returning clinical staff phone call. Thanks!    What Number to Call Back: [phone]    When to Expect a call back: (Before the end of the day)   -- if the call is after 12:00, the call back will be tomorrow.

## 2017-05-05 ENCOUNTER — PATIENT MESSAGE (OUTPATIENT)
Dept: INTERNAL MEDICINE | Facility: CLINIC | Age: 42
End: 2017-05-05

## 2017-05-05 NOTE — TELEPHONE ENCOUNTER
Spoke with Seu gómez and was informed that a lab appointment was no needed to complete pt's lab in six months. Pt has been informed of this also.

## 2017-05-10 ENCOUNTER — OFFICE VISIT (OUTPATIENT)
Dept: ORTHOPEDICS | Facility: CLINIC | Age: 42
End: 2017-05-10
Payer: COMMERCIAL

## 2017-05-10 VITALS — WEIGHT: 169.75 LBS | HEIGHT: 60 IN | BODY MASS INDEX: 33.33 KG/M2

## 2017-05-10 DIAGNOSIS — M25.572 LEFT ANKLE PAIN, UNSPECIFIED CHRONICITY: Primary | ICD-10-CM

## 2017-05-10 PROCEDURE — 1160F RVW MEDS BY RX/DR IN RCRD: CPT | Mod: S$GLB,,, | Performed by: PHYSICIAN ASSISTANT

## 2017-05-10 PROCEDURE — 99999 PR PBB SHADOW E&M-EST. PATIENT-LVL III: CPT | Mod: PBBFAC,,, | Performed by: PHYSICIAN ASSISTANT

## 2017-05-10 PROCEDURE — 99203 OFFICE O/P NEW LOW 30 MIN: CPT | Mod: S$GLB,,, | Performed by: PHYSICIAN ASSISTANT

## 2017-05-10 NOTE — MR AVS SNAPSHOT
[facility]  [address]  [facility] [address]  Phone: [phone]                  Liliana Wright   5/10/2017 10:00 AM   Appointment    Description:  Female : 1975   Provider:  Belle Levine PA-C   Department:  [facility]                To Do List           Future Appointments        Provider Department Dept Phone    5/10/2017 10:00 AM Belle Levine PA-C [facility] [phone]    2017 9:30 AM Ronaldo Jade IV, MD [facility] - OB/GYN [address] [phone]      Goals (5 Years of Data)     None      [facility] On [facility]     [facility] On Call Nurse Care Line -  Assistance  Unless otherwise directed by your provider, please contact [facility], our nurse care line that is available for  assistance.     Registered nurses in the [facility] On Call Center provide: appointment scheduling, clinical advisement, health education, and other advisory services.  Call: [phone] (toll free)               Medications           Message regarding Medications     Verify the changes and/or additions to your medication regime listed below are the same as discussed with your clinician today.  If any of these changes or additions are incorrect, please notify your healthcare provider.             Verify that the below list of medications is an accurate representation of the medications you are currently taking.  If none reported, the list may be blank. If incorrect, please contact your healthcare provider. Carry this list with you in case of emergency.           Current Medications     biotin 300 mcg Tab Take 1 tablet by mouth once daily.    ergocalciferol (ERGOCALCIFEROL) 50,000 unit Cap Take 1 capsule (50,000 Units total) by mouth every 7 days.    ferrous gluconate (FERGON) 325 MG Tab Take 325 mg by mouth daily with breakfast.    hydroquinone 4 % Crea Apply topically 2 (two) times daily.    naproxen sodium (ANAPROX) 220 MG tablet Take 220 mg by mouth every 12  (twelve) hours.    ranitidine (ZANTAC 75) 75 MG tablet Take 1 tablet (75 mg total) by mouth 2 (two) times daily.    vitamin D 1000 units Tab Take 185 mg by mouth once daily.           Clinical Reference Information           Your Vitals Were     Last Period                   04/19/2017           Allergies as of 5/10/2017     No Known Allergies      Immunizations Administered on Date of Encounter - 5/10/2017     None      Language Assistance Services     ATTENTION: Language assistance services are available, free of charge. Please call [phone].      ATENCIÓN: Si arthurla crystalsugar, tiene a logan disposición servicios gratuitos de asistencia lingüística. Llame al [phone].     JULIA Ý: N?u b?n nói Ti?ng Vi?t, có các d?ch v? h? tr? ngôn ng? mi?n phí dành cho b?n. G?i s? [phone].         [facility] complies with applicable Federal civil rights laws and does not discriminate on the basis of race, color, national origin, age, disability, or sex.

## 2017-05-10 NOTE — LETTER
May 10, 2017      Jenna Scott MD  [address]  [facility] [address]           [facility]  [address]  [place] [address]  Phone: [phone]          Patient: Liliana Wright   MR Number: [id number]   YOB: 1975   Date of Visit: 5/10/2017       Dear Dr. Jenna Scott:    Thank you for referring Liliana Wright to me for evaluation. Attached you will find relevant portions of my assessment and plan of care.    If you have questions, please do not hesitate to call me. I look forward to following Liliana Wright along with you.    Sincerely,    Belle Levine PA-C    Enclosure  CC:  No Recipients    If you would like to receive this communication electronically, please contact [email or web address] or [phone] to request more information on Shortlist Link access.    For providers and/or their staff who would like to refer a patient to [facility], please contact us through our one-stop-shop provider referral line, [facility], at [phone].    If you feel you have received this communication in error or would no longer like to receive these types of communications, please e-mail [email or web address]

## 2017-05-10 NOTE — PROGRESS NOTES
Subjective:      Patient ID: Liliana Wright is a 42 y.o. female.    Chief Complaint: No chief complaint on file.    HPI  42 year old female presents with chief complaint of left ankle swelling x 3 weeks. She denies recent trauma. She was hiking in [place] and was not wearing proper shoes. She reports intermittent pain at the lateral aspect worse with rainy weather but overall she doesn't have much pain. She fractured the ankle about 26 years ago and was treated in a cast. She reports occasional giving way. Tylenol and aleve help the pain. She does not elevate. She wears an ankle sleeve that helps some of the swelling. She does not wear inserts.   Review of Systems   Constitution: Negative for chills, fever and night sweats.   Cardiovascular: Negative for chest pain.   Respiratory: Negative for cough and shortness of breath.    Hematologic/Lymphatic: Does not bruise/bleed easily.   Skin: Negative for color change.   Gastrointestinal: Negative for heartburn.   Genitourinary: Negative for dysuria.   Neurological: Negative for numbness and paresthesias.   Psychiatric/Behavioral: Negative for altered mental status.   Allergic/Immunologic: Negative for persistent infections.         Objective:            General    Vitals reviewed.  Constitutional: She is oriented to person, place, and time. She appears well-developed and well-nourished.   Cardiovascular: Normal rate.    Neurological: She is alert and oriented to person, place, and time.         Left Ankle/Foot Exam     Inspection  Erythema: absent    Range of Motion   The patient has normal left ankle ROM.     Muscle Strength   The patient has normal left ankle strength.    Tests   Anterior drawer: negative  Varus tilt: negative    Other   Sensation: normal    Comments:  Pes planus. No TTP. Minimal swelling lateral aspect.       Vascular Exam       Left Pulses  Dorsalis Pedis:      2+              X-ray: reviewed by myself. No fx or dislocation.         Assessment:        Encounter Diagnosis   Name Primary?    Left ankle pain, unspecified chronicity Yes          Plan:       Discussed treatment options with patient including shoe wear modification. Recommend orthotics. Elevate for swelling. May continue compression sleeve as needed. RTC prn.

## 2017-05-11 ENCOUNTER — PATIENT MESSAGE (OUTPATIENT)
Dept: DERMATOLOGY | Facility: CLINIC | Age: 42
End: 2017-05-11

## 2017-05-17 ENCOUNTER — OFFICE VISIT (OUTPATIENT)
Dept: DERMATOLOGY | Facility: CLINIC | Age: 42
End: 2017-05-17
Payer: COMMERCIAL

## 2017-05-17 DIAGNOSIS — D18.00 ANGIOMA: ICD-10-CM

## 2017-05-17 DIAGNOSIS — D22.9 NEVUS: ICD-10-CM

## 2017-05-17 DIAGNOSIS — L94.0 MORPHEA: Primary | ICD-10-CM

## 2017-05-17 PROCEDURE — 99999 PR PBB SHADOW E&M-EST. PATIENT-LVL II: CPT | Mod: PBBFAC,,, | Performed by: DERMATOLOGY

## 2017-05-17 PROCEDURE — 1160F RVW MEDS BY RX/DR IN RCRD: CPT | Mod: S$GLB,,, | Performed by: DERMATOLOGY

## 2017-05-17 PROCEDURE — 99213 OFFICE O/P EST LOW 20 MIN: CPT | Mod: S$GLB,,, | Performed by: DERMATOLOGY

## 2017-05-17 NOTE — PROGRESS NOTES
Subjective:       Patient ID:  Liliana Wright is a 42 y.o. female who presents for   Chief Complaint   Patient presents with    Follow-up     morphea     HPI Comments: Pt c/o mole under left axilla. Present for a few years.  Not bleeding or tender. No tx  Also here for f/u morphea , pt feels it is stable. No new lesions.   C/o red lesions on chest. Not bleeding . No tx.  Present for a few months.       Review of Systems   Skin: Positive for rash. Negative for itching.   Hematologic/Lymphatic: Does not bruise/bleed easily.        Objective:    Physical Exam   Constitutional: She appears well-developed and well-nourished. No distress.   Neurological: She is alert and oriented to person, place, and time. She is not disoriented.   Psychiatric: She has a normal mood and affect.   Skin:   Areas Examined (abnormalities noted in diagram):   Scalp / Hair Palpated and Inspected  Head / Face Inspection Performed  Neck Inspection Performed  Abdomen Inspection Performed                   Diagram Legend     Erythematous scaling macule/papule c/w actinic keratosis       Vascular papule c/w angioma      Pigmented verrucoid papule/plaque c/w seborrheic keratosis      Yellow umbilicated papule c/w sebaceous hyperplasia      Irregularly shaped tan macule c/w lentigo     1-2 mm smooth white papules consistent with Milia      Movable subcutaneous cyst with punctum c/w epidermal inclusion cyst      Subcutaneous movable cyst c/w pilar cyst      Firm pink to brown papule c/w dermatofibroma      Pedunculated fleshy papule(s) c/w skin tag(s)      Evenly pigmented macule c/w junctional nevus     Mildly variegated pigmented, slightly irregular-bordered macule c/w mildly atypical nevus      Flesh colored to evenly pigmented papule c/w intradermal nevus       Pink pearly papule/plaque c/w basal cell carcinoma      Erythematous hyperkeratotic cursted plaque c/w SCC      Surgical scar with no sign of skin cancer recurrence      Open and closed  comedones      Inflammatory papules and pustules      Verrucoid papule consistent consistent with wart     Erythematous eczematous patches and plaques     Dystrophic onycholytic nail with subungual debris c/w onychomycosis     Umbilicated papule    Erythematous-base heme-crusted tan verrucoid plaque consistent with inflamed seborrheic keratosis     Erythematous Silvery Scaling Plaque c/w Psoriasis     See annotation      Assessment / Plan:        Morphea  Stable, no current tx needed   Call if flares or gets new lesions    Angioma  These are benign vascular lesions that are inherited.  Treatment is not necessary.    Nevus  Discussed ABCDE's of nevi.  Monitor for new mole or moles that are becoming bigger, darker, irritated, or developing irregular borders. Brochure provided.             Return in about 2 years (around 5/17/2019), or if symptoms worsen or fail to improve.

## 2017-05-30 ENCOUNTER — OFFICE VISIT (OUTPATIENT)
Dept: OBSTETRICS AND GYNECOLOGY | Facility: CLINIC | Age: 42
End: 2017-05-30
Payer: COMMERCIAL

## 2017-05-30 VITALS
SYSTOLIC BLOOD PRESSURE: 102 MMHG | DIASTOLIC BLOOD PRESSURE: 80 MMHG | BODY MASS INDEX: 34.46 KG/M2 | WEIGHT: 175.5 LBS | HEIGHT: 60 IN

## 2017-05-30 DIAGNOSIS — Z12.31 VISIT FOR SCREENING MAMMOGRAM: ICD-10-CM

## 2017-05-30 DIAGNOSIS — Z01.419 GYNECOLOGIC EXAM NORMAL: Primary | ICD-10-CM

## 2017-05-30 PROCEDURE — 99396 PREV VISIT EST AGE 40-64: CPT | Mod: S$GLB,,, | Performed by: OBSTETRICS & GYNECOLOGY

## 2017-05-30 PROCEDURE — 99999 PR PBB SHADOW E&M-EST. PATIENT-LVL II: CPT | Mod: PBBFAC,,, | Performed by: OBSTETRICS & GYNECOLOGY

## 2017-05-30 NOTE — PROGRESS NOTES
CC: Well woman exam    Liliana Wright is a 42 y.o. female  presents for well woman exam.  LMP: Patient's last menstrual period was 2017.  No GYN issues, problems, or complaints.  Regular, cyclic menses reported.    Past Medical History:   Diagnosis Date    Abnormal Pap smear     GEGE I on bx     Abnormal Pap smear of cervix     Cervical high risk HPV (human papillomavirus) test positive     History of ovarian cyst 2008    History of polycystic ovarian disease     insulin resistance    Scleroderma     followed by derm (Dr. Stoddard)     Past Surgical History:   Procedure Laterality Date     SECTION      X 2    CHOLECYSTECTOMY      DILATION AND CURETTAGE OF UTERUS  2014    missed AB     OVARIAN CYST REMOVAL      with c/s      Social History     Social History    Marital status:      Spouse name: N/A    Number of children: N/A    Years of education: N/A     Occupational History     [employer]     Social History Main Topics    Smoking status: Never Smoker    Smokeless tobacco: Never Used    Alcohol use No    Drug use: No    Sexual activity: Yes     Partners: Male     Birth control/ protection: None      Comment:  to [facility]      Other Topics Concern    Are You Pregnant Or Think You May Be? No    Breast-Feeding No     Social History Narrative    From [place]    Moved to [facility] in          Family History   Problem Relation Age of Onset    Lupus Father     No Known Problems Mother     Breast cancer Maternal Grandmother 70    No Known Problems Sister     No Known Problems Brother     No Known Problems Daughter     No Known Problems Son     Alopecia Neg Hx     Diabetes Neg Hx     Hypertension Neg Hx     Heart disease Neg Hx     Stroke Neg Hx     Acne Neg Hx     Eczema Neg Hx     Psoriasis Neg Hx     Melanoma Neg Hx      OB History      Para Term  AB Living    4 2 2  1 2    SAB TAB Ectopic Multiple Live Births    1    2           /80   Ht 5' (1.524 m)   Wt 79.6 kg (175 lb 7.8 oz)   LMP 05/18/2017   BMI 34.27 kg/m²       ROS:  GENERAL: Denies weight gain or weight loss. Feeling well overall.   SKIN: Denies rash or lesions.   HEAD: Denies head injury or headache.   NODES: Denies enlarged lymph nodes.   CHEST: Denies chest pain or shortness of breath.   CARDIOVASCULAR: Denies palpitations or left sided chest pain.   ABDOMEN: No abdominal pain, constipation, diarrhea, nausea, vomiting or rectal bleeding.   URINARY: No frequency, dysuria, hematuria, or burning on urination.  REPRODUCTIVE: See HPI.   BREASTS: The patient performs breast self-examination and denies pain, lumps, or nipple discharge.   HEMATOLOGIC: No easy bruisability or excessive bleeding.   MUSCULOSKELETAL: Denies joint pain or swelling.   NEUROLOGIC: Denies syncope or weakness.   PSYCHIATRIC: Denies depression, anxiety or mood swings.    PHYSICAL EXAM:  APPEARANCE: Well nourished, well developed, in no acute distress.  AFFECT: WNL, alert and oriented x 3  SKIN: No acne or hirsutism  NECK: Neck symmetric without masses or thyromegaly  NODES: No inguinal, cervical, axillary, or femoral lymph node enlargement  CHEST: Good respiratory effect  ABDOMEN: Soft.  No tenderness or masses.  No hepatosplenomegaly.  No hernias.  BREASTS: Symmetrical, no skin changes or visible lesions.  No palpable masses, nipple discharge bilaterally.  PELVIC: Normal external genitalia without lesions.  Normal hair distribution.  Adequate perineal body, normal urethral meatus.  Vagina moist and well rugated without lesions or discharge.  Cervix pink, without lesions, discharge or tenderness.  No significant cystocele or rectocele.  Bimanual exam shows uterus to be normal size, regular, mobile and nontender.  Adnexa without masses or tenderness.    EXTREMITIES: No edema.    Gynecologic exam normal    Visit for screening mammogram  -     Mammo Digital Screening Bilat with Tomosynthesis CAD;  Future; Expected date: 05/30/2017    Other orders  -     Cancel: Liquid-based pap smear, screening  -     Cancel: HPV High Risk Genotypes, PCR    Age specific counseling performed.    Papsmear not done/not indicated.    Patient declines contraception/hormonal contraception    Patient instructed to take daily MVI/folic acid.    Patient instructed to keep a menstrual calendar.    Patient was counseled today on A.C.S. Pap guidelines and recommendations for yearly pelvic exams, mammograms and monthly self breast exams; to see her PCP for other health maintenance.     Return in about 1 year (around 5/30/2018) for Annual exam.    Ronaldo Jade IV, MD

## 2017-07-18 ENCOUNTER — PATIENT MESSAGE (OUTPATIENT)
Dept: OBSTETRICS AND GYNECOLOGY | Facility: CLINIC | Age: 42
End: 2017-07-18

## 2017-09-13 ENCOUNTER — PATIENT MESSAGE (OUTPATIENT)
Dept: INTERNAL MEDICINE | Facility: CLINIC | Age: 42
End: 2017-09-13

## 2017-09-14 ENCOUNTER — OFFICE VISIT (OUTPATIENT)
Dept: INTERNAL MEDICINE | Facility: CLINIC | Age: 42
End: 2017-09-14
Payer: COMMERCIAL

## 2017-09-14 ENCOUNTER — TELEPHONE (OUTPATIENT)
Dept: INTERNAL MEDICINE | Facility: CLINIC | Age: 42
End: 2017-09-14

## 2017-09-14 ENCOUNTER — LAB VISIT (OUTPATIENT)
Dept: LAB | Facility: HOSPITAL | Age: 42
End: 2017-09-14
Attending: INTERNAL MEDICINE
Payer: COMMERCIAL

## 2017-09-14 VITALS
DIASTOLIC BLOOD PRESSURE: 84 MMHG | OXYGEN SATURATION: 95 % | BODY MASS INDEX: 31.94 KG/M2 | HEIGHT: 60 IN | WEIGHT: 162.69 LBS | SYSTOLIC BLOOD PRESSURE: 132 MMHG | HEART RATE: 81 BPM

## 2017-09-14 DIAGNOSIS — E28.2 PCO (POLYCYSTIC OVARIES): ICD-10-CM

## 2017-09-14 DIAGNOSIS — R42 VERTIGO: ICD-10-CM

## 2017-09-14 DIAGNOSIS — N91.2 AMENORRHEA: ICD-10-CM

## 2017-09-14 DIAGNOSIS — E55.9 VITAMIN D DEFICIENCY: Primary | ICD-10-CM

## 2017-09-14 DIAGNOSIS — F41.0 PANIC ATTACK: Primary | ICD-10-CM

## 2017-09-14 DIAGNOSIS — E55.9 VITAMIN D DEFICIENCY: ICD-10-CM

## 2017-09-14 LAB
25(OH)D3+25(OH)D2 SERPL-MCNC: 25 NG/ML
CRP SERPL-MCNC: 9.7 MG/L
ERYTHROCYTE [SEDIMENTATION RATE] IN BLOOD BY WESTERGREN METHOD: 40 MM/HR
HCG INTACT+B SERPL-ACNC: <1.2 MIU/ML

## 2017-09-14 PROCEDURE — 84702 CHORIONIC GONADOTROPIN TEST: CPT

## 2017-09-14 PROCEDURE — 36415 COLL VENOUS BLD VENIPUNCTURE: CPT

## 2017-09-14 PROCEDURE — 86140 C-REACTIVE PROTEIN: CPT

## 2017-09-14 PROCEDURE — 3008F BODY MASS INDEX DOCD: CPT | Mod: S$GLB,,, | Performed by: INTERNAL MEDICINE

## 2017-09-14 PROCEDURE — 99214 OFFICE O/P EST MOD 30 MIN: CPT | Mod: S$GLB,,, | Performed by: INTERNAL MEDICINE

## 2017-09-14 PROCEDURE — 85651 RBC SED RATE NONAUTOMATED: CPT

## 2017-09-14 PROCEDURE — 99999 PR PBB SHADOW E&M-EST. PATIENT-LVL III: CPT | Mod: PBBFAC,,, | Performed by: INTERNAL MEDICINE

## 2017-09-14 PROCEDURE — 82306 VITAMIN D 25 HYDROXY: CPT

## 2017-09-14 RX ORDER — ERGOCALCIFEROL 1.25 MG/1
CAPSULE ORAL
Refills: 0 | COMMUNITY
Start: 2017-09-01 | End: 2017-09-14 | Stop reason: SDUPTHER

## 2017-09-14 RX ORDER — LORAZEPAM 0.5 MG/1
0.5 TABLET ORAL EVERY 12 HOURS PRN
Qty: 20 TABLET | Refills: 0 | Status: SHIPPED | OUTPATIENT
Start: 2017-09-14 | End: 2018-02-08

## 2017-09-14 RX ORDER — ERGOCALCIFEROL 1.25 MG/1
50000 CAPSULE ORAL
Qty: 12 CAPSULE | Refills: 1 | Status: SHIPPED | OUTPATIENT
Start: 2017-09-14 | End: 2017-12-01

## 2017-09-14 NOTE — PROGRESS NOTES
[facility]  Progress Note    PRESENTING HISTORY     PCP: Jenna Scott MD  Chief Complaint/Reason for Visit:     Chief Complaint   Patient presents with    Amenorrhea    Dizziness    Fatigue     History of Present Illness & ROS : Ms. Liliana Wright is a 42 y.o. female.      Saturday she woke up feeling the room spinning (lasted 30 sec).  She has lightheadedness.  She has fatigue since Saturday.    Friday, alarm was on. She had palpitation.  Was having panic attack.  Not sleeping well.     Dry cough since last night.       is a .  She is an .    A month ago she started exercising.    No period since  since starting exercise.    PAST HISTORY:     Past Medical History:   Diagnosis Date    Abnormal Pap smear     GEGE I on bx     Cervical high risk HPV (human papillomavirus) test positive     PCO (polycystic ovaries) 3/13/2014    Took Metformin during first two pregnancies, stopped before conception this pregnancy and does not want to take     Previous  section 3/13/2014    X 2, induction and FTP, 2nd preg PROM at 37wk with repeat     Rh negative status during pregnancy 3/14/2014    Rhogam at 28wk     Scleroderma     followed by derm (Dr. Stoddard)    Vegetarian 2017       Past Surgical History:   Procedure Laterality Date     SECTION      X 2    CHOLECYSTECTOMY      DILATION AND CURETTAGE OF UTERUS  2014    missed AB     OVARIAN CYST REMOVAL      with c/s        Family History   Problem Relation Age of Onset    Lupus Father     No Known Problems Mother     Breast cancer Maternal Grandmother 70    No Known Problems Sister     No Known Problems Brother     No Known Problems Daughter     No Known Problems Son     Alopecia Neg Hx     Diabetes Neg Hx     Hypertension Neg Hx     Heart disease Neg Hx     Stroke Neg Hx     Acne Neg Hx     Eczema Neg Hx     Psoriasis Neg Hx     Melanoma Neg Hx        Social History     Social  History    Marital status:      Spouse name: N/A    Number of children: N/A    Years of education: N/A     Occupational History     [employer]     Social History Main Topics    Smoking status: Never Smoker    Smokeless tobacco: Never Used    Alcohol use No    Drug use: No    Sexual activity: Yes     Partners: Male     Birth control/ protection: None      Comment:  to [facility]      Other Topics Concern    Are You Pregnant Or Think You May Be? No    Breast-Feeding No     Social History Narrative    From [place]    Moved to [facility] in 1991           MEDICATIONS & ALLERGIES:     Current Outpatient Prescriptions on File Prior to Visit   Medication Sig Dispense Refill    biotin 300 mcg Tab Take 1 tablet by mouth once daily.       No current facility-administered medications on file prior to visit.         Review of patient's allergies indicates:  No Known Allergies    Medications Reconciliation:   I have reconciled the patient's home medications with the patient/family. I have updated all changes.  Refer to After-Visit Medication List.    OBJECTIVE:     Vital Signs:  Vitals:    09/14/17 1516   BP: 132/84   Pulse: 81     Wt Readings from Last 1 Encounters:   09/14/17 1516 73.8 kg (162 lb 11.2 oz)     Body mass index is 31.78 kg/m².     Physical Exam:  General: Well developed, well nourished.    HEENT: Head is normocephalic, atraumatic.  Mouth is clear.  Eyes: Clear conjunctiva.  Neck: Supple, symmetrical neck; trachea midline.  Lungs: Clear to auscultation bilaterally and normal respiratory effort.  Cardiovascular: Heart with regular rate and rhythm.    Extremities: No LE edema.    Skin: Skin color, texture, turgor normal. No rashes.  Musculoskeletal: Normal gait.   Psychiatric: Anxious. No suicidal.    Laboratory  Lab Results   Component Value Date    WBC 7.33 05/01/2017    HGB 12.4 05/01/2017    HCT 37.3 05/01/2017     05/01/2017    CHOL 189 01/14/2017    TRIG 138 01/14/2017    HDL 33 (L)  01/14/2017    ALT 30 05/01/2017    AST 26 05/01/2017     05/01/2017    K 3.5 05/01/2017     05/01/2017    CREATININE 0.8 05/01/2017    BUN 7 05/01/2017    CO2 21 (L) 05/01/2017    TSH 1.361 05/01/2017    HGBA1C 5.1 01/14/2017       ASSESSMENT & PLAN:     Panic attack  - She is very concerned and stressed.     Father has SLE.  - Increased stress at home and work.  Recent alarm really triggered this.  -     lorazepam (ATIVAN) 0.5 MG tablet; Take 1 tablet (0.5 mg total) by mouth every 12 (twelve) hours as needed for Anxiety.  Dispense: 20 tablet; Refill: 0    Amenorrhea  PCO (polycystic ovaries)  -     Will Check:  -     HCG, QUANTITATIVE, PREGNANCY; Future; Expected date: 09/14/2017  -     Sedimentation rate, manual; Future; Expected date: 09/14/2017  -     C-reactive protein; Future; Expected date: 09/14/2017    Vertigo  -  Resolved. Mild viral syndrome.    Scheduled Follow-up :  Future Appointments  Date Time Provider Department Center   9/19/2017 12:40 PM Jenna Scott MD [facility]       After Visit Medication List :     Medication List          Accurate as of 9/14/17  3:40 PM. If you have any questions, ask your nurse or doctor.               START taking these medications    lorazepam 0.5 MG tablet  Commonly known as:  ATIVAN  Take 1 tablet (0.5 mg total) by mouth every 12 (twelve) hours as needed for Anxiety.  Started by:  Junito Marrufo MD        CONTINUE taking these medications    biotin 300 mcg Tab     ergocalciferol 50,000 unit Cap  Commonly known as:  ERGOCALCIFEROL           Where to Get Your Medications      These medications were sent to [id number] - [place] [address] [address]    Phone:  [phone]   · lorazepam 0.5 MG tablet         Signing Physician:  Junito Marrufo MD

## 2017-09-15 NOTE — TELEPHONE ENCOUNTER
Message sent to pt via my chart with lab results and updates to plan.   Please schedule vit d in 6 months

## 2017-09-19 ENCOUNTER — OFFICE VISIT (OUTPATIENT)
Dept: INTERNAL MEDICINE | Facility: CLINIC | Age: 42
End: 2017-09-19
Attending: INTERNAL MEDICINE
Payer: COMMERCIAL

## 2017-09-19 VITALS
BODY MASS INDEX: 30.39 KG/M2 | DIASTOLIC BLOOD PRESSURE: 65 MMHG | SYSTOLIC BLOOD PRESSURE: 114 MMHG | HEART RATE: 70 BPM | HEIGHT: 61 IN | OXYGEN SATURATION: 98 % | WEIGHT: 160.94 LBS

## 2017-09-19 DIAGNOSIS — R70.0 ESR RAISED: ICD-10-CM

## 2017-09-19 DIAGNOSIS — L94.0 MORPHEA: ICD-10-CM

## 2017-09-19 DIAGNOSIS — R79.82 CRP ELEVATED: ICD-10-CM

## 2017-09-19 DIAGNOSIS — E55.9 VITAMIN D DEFICIENCY: Primary | ICD-10-CM

## 2017-09-19 DIAGNOSIS — F41.9 ANXIETY: ICD-10-CM

## 2017-09-19 PROCEDURE — 99999 PR PBB SHADOW E&M-EST. PATIENT-LVL III: CPT | Mod: PBBFAC,,, | Performed by: INTERNAL MEDICINE

## 2017-09-19 PROCEDURE — 99214 OFFICE O/P EST MOD 30 MIN: CPT | Mod: S$GLB,,, | Performed by: INTERNAL MEDICINE

## 2017-09-19 PROCEDURE — 3008F BODY MASS INDEX DOCD: CPT | Mod: S$GLB,,, | Performed by: INTERNAL MEDICINE

## 2017-09-19 NOTE — LETTER
September 19, 2017      [facility]  [address]  [facility] [address]  Phone: [phone]  Fax: [phone]       Patient: Liliana Wright   YOB: 1975  Date of Visit: 09/19/2017    To Whom It May Concern:    Meg Wright  was at [facility] on 09/19/2017. She may return to work/school on 9/20/2017 with no restrictions. If you have any questions or concerns, or if I can be of further assistance, please do not hesitate to contact me.    Sincerely,    Jenna Scott MD

## 2017-09-19 NOTE — PROGRESS NOTES
Subjective:   Patient ID: Liliana Wright is a 42 y.o. female  Chief complaint:   Chief Complaint   Patient presents with    Menstrual Problem    Dizziness       HPI  Pt here today for f/u after UC appt. Was seen 9/14 for vertigo and anxiety. Prior to this her house alarm on 9/10 went off in middle of night - per monitoring system someone was trying to break in through back door. She was panicked and called police as she was home alone with her children while her  was at work as . Police came and didn't see any signs of tampering per pt. She has had trouble falling asleep since then. Racing thoughts and worrying that this may happen again when she is sleeping.   The next morning she awoke and the room was spinning - was dx with vertigo - seen in  on 9/14 - she reports had Vertigo x 3 episodes total and this has since resolved. She reports at the time of vertigo had mild URI symptoms - sore throat that has resolved as well. She reports is exhausted this week due to poor sleep. Is scared to take any sedating meds as her children are currently sick and she wants to be alert if they need her during the night.    Reports increased stress over past month - she also inc exercise 1 month ago - 10 mi on bike daily, In school and started new diet.  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    She only took 2-3 doses of benzo since prescribed at    Tearful and reports  not supportive over past week and overwhelmed.   At , ESR and CRP elevated at that time 9/14. She reports URI symptoms like scratchy throat have subsided. No joint swelling, redness, inc warmth, morning stiffness > 30min, oral lesions/ulcers or hair loss. + family hx of SLE in dad    Review of Systems   Constitutional: Positive for fatigue. Negative for chills, diaphoresis, fever and unexpected weight change.   HENT: Negative for congestion, ear pain, postnasal drip  "and rhinorrhea.    Eyes: Negative for visual disturbance.   Respiratory: Negative for cough and shortness of breath.    Cardiovascular: Negative for chest pain, palpitations and leg swelling.   Gastrointestinal: Negative for abdominal pain and diarrhea.   Genitourinary: Negative for dysuria and frequency.   Skin: Negative for color change and rash.   Neurological: Negative for dizziness, weakness, light-headedness and headaches.   Psychiatric/Behavioral: Positive for sleep disturbance. Negative for dysphoric mood, hallucinations, self-injury and suicidal ideas. The patient is nervous/anxious.      Objective:  Vitals:    09/19/17 1250   BP: 114/65   Pulse: 70   SpO2: 98%   Weight: 73 kg (160 lb 15 oz)   Height: 5' 1" (1.549 m)     Body mass index is 30.41 kg/m².    Physical Exam   Constitutional: She is oriented to person, place, and time. She appears well-developed and well-nourished.   HENT:   Head: Normocephalic and atraumatic.   Right Ear: External ear normal.   Left Ear: External ear normal.   Nose: Nose normal.   Mouth/Throat: Oropharynx is clear and moist. No oropharyngeal exudate.   Eyes: Conjunctivae and EOM are normal.   Neck: Normal range of motion. Neck supple.   Cardiovascular: Normal rate, regular rhythm and intact distal pulses.    Pulmonary/Chest: Effort normal and breath sounds normal.   Abdominal: Soft. Normal appearance and bowel sounds are normal.   Musculoskeletal: She exhibits no edema or tenderness.   Neurological: She is alert and oriented to person, place, and time. She has normal strength. Gait normal.   Skin: Skin is warm, dry and intact. Capillary refill takes less than 2 seconds. No cyanosis. Nails show no clubbing.   Psychiatric: She has a normal mood and affect. Her speech is normal and behavior is normal. Cognition and memory are normal.   Vitals reviewed.      Assessment:  1. Vitamin D deficiency    2. Morphea    3. Anxiety    4. ESR raised    5. CRP elevated        Plan:  Liliana was " seen today for menstrual problem and dizziness.    Diagnoses and all orders for this visit:    Vitamin D deficiency: cont vit d Rx, check labsin 3 months    Morphea: stable, followed by derm    Anxiety: trigger was alarm set off 1 week ago - suspect this will improve with time. rec she discuss concerns with her . discussed tx options - counseling, prn and controller meds - she can try benadryl or melatonin 0.5mg otc at night. rec go to bed at earlier time this week and exercise earlier in day if needed. She will let me know if reconsider controller med like zoloft in future.     ESR raised: suspect may be due to recent URI - will repeat in a couple of weeks.   -     Sedimentation rate, manual; Future    CRP elevated  -     C-reactive protein; Future    Vertigo - resolved.

## 2017-10-14 ENCOUNTER — LAB VISIT (OUTPATIENT)
Dept: LAB | Facility: HOSPITAL | Age: 42
End: 2017-10-14
Attending: INTERNAL MEDICINE
Payer: COMMERCIAL

## 2017-10-14 DIAGNOSIS — E55.9 VITAMIN D DEFICIENCY: ICD-10-CM

## 2017-10-14 DIAGNOSIS — R79.82 CRP ELEVATED: ICD-10-CM

## 2017-10-14 DIAGNOSIS — R70.0 ESR RAISED: ICD-10-CM

## 2017-10-14 LAB
CRP SERPL-MCNC: 8 MG/L
ERYTHROCYTE [SEDIMENTATION RATE] IN BLOOD BY WESTERGREN METHOD: 33 MM/HR

## 2017-10-14 PROCEDURE — 85651 RBC SED RATE NONAUTOMATED: CPT

## 2017-10-14 PROCEDURE — 36415 COLL VENOUS BLD VENIPUNCTURE: CPT

## 2017-10-14 PROCEDURE — 82306 VITAMIN D 25 HYDROXY: CPT

## 2017-10-14 PROCEDURE — 86140 C-REACTIVE PROTEIN: CPT

## 2017-10-15 LAB — 25(OH)D3+25(OH)D2 SERPL-MCNC: 26 NG/ML

## 2017-10-18 ENCOUNTER — TELEPHONE (OUTPATIENT)
Dept: INTERNAL MEDICINE | Facility: CLINIC | Age: 42
End: 2017-10-18

## 2017-10-18 DIAGNOSIS — E55.9 VITAMIN D DEFICIENCY: Primary | ICD-10-CM

## 2017-10-18 DIAGNOSIS — R70.0 ELEVATED SED RATE: ICD-10-CM

## 2017-10-18 NOTE — TELEPHONE ENCOUNTER
Pt call and states she read results to via my [facility] and verbalize and agrees and was place call on 6m call list for lab. Pt does not have any further questions at this time.

## 2017-10-18 NOTE — TELEPHONE ENCOUNTER
Message sent to pt via my chart with lab results and updates to plan.   Please schedule labs in 6 months

## 2017-11-13 ENCOUNTER — PATIENT MESSAGE (OUTPATIENT)
Dept: INTERNAL MEDICINE | Facility: CLINIC | Age: 42
End: 2017-11-13

## 2017-11-22 ENCOUNTER — PATIENT OUTREACH (OUTPATIENT)
Dept: INTERNAL MEDICINE | Facility: CLINIC | Age: 42
End: 2017-11-22

## 2017-11-22 NOTE — PROGRESS NOTES
[facility] is committed to your overall health.  To help you get the most out of each of your visits, we will review your information to make sure you are up to date on all of your recommended tests and/or procedures.       Your PCP  Jenna Scott MD   found that you may be due for:       Health Maintenance Due   Topic Date Due    Influenza Vaccine  08/01/2017             If you have had any of the above done at another facility, please bring the records or information with you so that your record at [facility] will be complete.  If you would like to schedule any of these, please contact me.     If you are currently taking medication, please bring it with you to your appointment for review.     Also, if you have any type of Advanced Directives, please bring them with you to your office visit so we may scan them into your chart.     Thank you for Choosing [facility] for your healthcare needs.      Additional Information  If you have questions, you can email [email or web address] or call [phone]  to talk to our [facility] staff. Remember, [facility] is NOT to be used for urgent needs. For medical emergencies, dial 911.

## 2018-02-05 ENCOUNTER — TELEPHONE (OUTPATIENT)
Dept: SURGERY | Facility: CLINIC | Age: 43
End: 2018-02-05

## 2018-02-05 ENCOUNTER — PATIENT MESSAGE (OUTPATIENT)
Dept: OBSTETRICS AND GYNECOLOGY | Facility: CLINIC | Age: 43
End: 2018-02-05

## 2018-02-05 NOTE — TELEPHONE ENCOUNTER
Patient called requesting an appointment with Erum Lipscomb NP. Patient explains that she is experiencing areolar pain in her breast, she states that she is about to start her period, but her breast pain is usually bilateral in these cases. She states her grandmother  of breast cancer, so she is slightly more concerned then the average person about breast issues. She sent her OBGYN a message, and he told her to call us for an appointment. Per patient the pain is a 3/10, and it is not debilitating.     Scheduled with Erum Lipscomb for Thursday at 1:30. Reviewed location of the [facility] [place], patient verbalized understanding of all information.

## 2018-02-08 ENCOUNTER — OFFICE VISIT (OUTPATIENT)
Dept: SURGERY | Facility: CLINIC | Age: 43
End: 2018-02-08
Payer: COMMERCIAL

## 2018-02-08 ENCOUNTER — HOSPITAL ENCOUNTER (OUTPATIENT)
Dept: RADIOLOGY | Facility: HOSPITAL | Age: 43
Discharge: HOME | End: 2018-02-08
Attending: NURSE PRACTITIONER
Payer: COMMERCIAL

## 2018-02-08 VITALS
DIASTOLIC BLOOD PRESSURE: 79 MMHG | HEART RATE: 98 BPM | WEIGHT: 158.81 LBS | HEIGHT: 61 IN | BODY MASS INDEX: 29.98 KG/M2 | SYSTOLIC BLOOD PRESSURE: 117 MMHG | TEMPERATURE: 99 F

## 2018-02-08 DIAGNOSIS — N64.4 BREAST PAIN: ICD-10-CM

## 2018-02-08 DIAGNOSIS — N64.4 BREAST PAIN: Primary | ICD-10-CM

## 2018-02-08 PROCEDURE — 99999 PR PBB SHADOW E&M-EST. PATIENT-LVL III: CPT | Mod: PBBFAC,,, | Performed by: NURSE PRACTITIONER

## 2018-02-08 PROCEDURE — 77062 BREAST TOMOSYNTHESIS BI: CPT | Mod: 26,,, | Performed by: RADIOLOGY

## 2018-02-08 PROCEDURE — 76642 ULTRASOUND BREAST LIMITED: CPT | Mod: TC,PO,RT

## 2018-02-08 PROCEDURE — 77066 DX MAMMO INCL CAD BI: CPT | Mod: 26,,, | Performed by: RADIOLOGY

## 2018-02-08 PROCEDURE — 99202 OFFICE O/P NEW SF 15 MIN: CPT | Mod: S$GLB,,, | Performed by: NURSE PRACTITIONER

## 2018-02-08 PROCEDURE — 77066 DX MAMMO INCL CAD BI: CPT | Mod: TC,PO

## 2018-02-08 PROCEDURE — 76642 ULTRASOUND BREAST LIMITED: CPT | Mod: 26,RT,, | Performed by: RADIOLOGY

## 2018-02-08 PROCEDURE — 3008F BODY MASS INDEX DOCD: CPT | Mod: S$GLB,,, | Performed by: NURSE PRACTITIONER

## 2018-02-08 PROCEDURE — 99213 OFFICE O/P EST LOW 20 MIN: CPT | Mod: 25,PO | Performed by: NURSE PRACTITIONER

## 2018-02-08 RX ORDER — ERGOCALCIFEROL 1.25 MG/1
CAPSULE ORAL
COMMUNITY
Start: 2018-01-21 | End: 2018-03-26 | Stop reason: SDUPTHER

## 2018-02-08 NOTE — PROGRESS NOTES
"Subjective:      Patient ID: Liliana Wright is a 42 y.o. female.    Chief Complaint: Breast Pain (Right Breast/Nipple)      HPI: (PF, EPF - 1-3) (Detailed, Comp, - 4) new patient presents with c/o pain right breast/nipple, onset 3 days ago, "constant". Denies breast mass, skin changes, recent trauma, nipple discharge.     2017 screening mmg with no abnormality     Menarche at 19   first at 32  LMP 2018      Review of Systems  Objective:   Physical Exam   Pulmonary/Chest: Right breast exhibits tenderness. Right breast exhibits no inverted nipple, no mass, no nipple discharge and no skin change. Left breast exhibits no inverted nipple, no mass, no nipple discharge, no skin change and no tenderness.   She reports focal tenderness right areola at 6 o'clock, area marked for imaging, no mass or thickening, no skin changes    Lymphadenopathy:     She has no cervical adenopathy.     She has no axillary adenopathy.        Right: No supraclavicular adenopathy present.        Left: No supraclavicular adenopathy present.     Assessment:       1. Breast pain        Plan:     pain right breast with no palpable abnormality, likely benign. Discussed s/s of breast cancer and sources of breast pain.   bilat mmg and right US, no abnormality reported, VIPUL  Return prn any breast changes or increase in focal pain      "

## 2018-03-15 ENCOUNTER — PATIENT OUTREACH (OUTPATIENT)
Dept: ADMINISTRATIVE | Facility: HOSPITAL | Age: 43
End: 2018-03-15

## 2018-03-15 NOTE — PROGRESS NOTES
[facility] is committed to your overall health.  To help you get the most out of each of your visits, we will review your information to make sure you are up to date on all of your recommended tests and/or procedures.       Your PCP  Jenna Scott MD   found that you may be due for:       Health Maintenance Due   Topic Date Due    Influenza Vaccine  08/01/2017             If you have had any of the above done at another facility, please bring the records or information with you so that your record at [facility] will be complete.  If you would like to schedule any of these, please contact me.     If you are currently taking medication, please bring it with you to your appointment for review.     Also, if you have any type of Advanced Directives, please bring them with you to your office visit so we may scan them into your chart.       Thank you for Choosing [facility] for your healthcare needs.        Additional Information  If you have questions, you can email [email or web address] or call [phone]  to talk to our [facility] staff. Remember, [facility] is NOT to be used for urgent needs. For medical emergencies, dial 911.

## 2018-03-23 ENCOUNTER — OFFICE VISIT (OUTPATIENT)
Dept: INTERNAL MEDICINE | Facility: CLINIC | Age: 43
End: 2018-03-23
Attending: INTERNAL MEDICINE
Payer: COMMERCIAL

## 2018-03-23 ENCOUNTER — LAB VISIT (OUTPATIENT)
Dept: LAB | Facility: OTHER | Age: 43
End: 2018-03-23
Attending: INTERNAL MEDICINE
Payer: COMMERCIAL

## 2018-03-23 VITALS
WEIGHT: 159.81 LBS | HEART RATE: 72 BPM | BODY MASS INDEX: 30.17 KG/M2 | DIASTOLIC BLOOD PRESSURE: 82 MMHG | SYSTOLIC BLOOD PRESSURE: 117 MMHG | OXYGEN SATURATION: 99 % | HEIGHT: 61 IN

## 2018-03-23 DIAGNOSIS — Z00.00 ANNUAL PHYSICAL EXAM: ICD-10-CM

## 2018-03-23 DIAGNOSIS — E55.9 VITAMIN D DEFICIENCY: ICD-10-CM

## 2018-03-23 DIAGNOSIS — R70.0 ELEVATED SED RATE: ICD-10-CM

## 2018-03-23 DIAGNOSIS — Z00.00 ANNUAL PHYSICAL EXAM: Primary | ICD-10-CM

## 2018-03-23 DIAGNOSIS — L94.0 MORPHEA: ICD-10-CM

## 2018-03-23 DIAGNOSIS — E28.2 PCO (POLYCYSTIC OVARIES): ICD-10-CM

## 2018-03-23 LAB
25(OH)D3+25(OH)D2 SERPL-MCNC: 27 NG/ML
ALBUMIN SERPL BCP-MCNC: 3.9 G/DL
ALP SERPL-CCNC: 77 U/L
ALT SERPL W/O P-5'-P-CCNC: 20 U/L
ANION GAP SERPL CALC-SCNC: 11 MMOL/L
AST SERPL-CCNC: 19 U/L
BASOPHILS # BLD AUTO: 0.03 K/UL
BASOPHILS NFR BLD: 0.6 %
BILIRUB SERPL-MCNC: 0.7 MG/DL
BUN SERPL-MCNC: 7 MG/DL
CALCIUM SERPL-MCNC: 9.4 MG/DL
CHLORIDE SERPL-SCNC: 107 MMOL/L
CHOLEST SERPL-MCNC: 195 MG/DL
CHOLEST/HDLC SERPL: 4.8 {RATIO}
CO2 SERPL-SCNC: 21 MMOL/L
CREAT SERPL-MCNC: 0.7 MG/DL
CRP SERPL-MCNC: 7.9 MG/L
DIFFERENTIAL METHOD: ABNORMAL
EOSINOPHIL # BLD AUTO: 0 K/UL
EOSINOPHIL NFR BLD: 0.2 %
ERYTHROCYTE [DISTWIDTH] IN BLOOD BY AUTOMATED COUNT: 14 %
ERYTHROCYTE [SEDIMENTATION RATE] IN BLOOD BY WESTERGREN METHOD: 41 MM/HR
EST. GFR  (AFRICAN AMERICAN): >60 ML/MIN/1.73 M^2
EST. GFR  (NON AFRICAN AMERICAN): >60 ML/MIN/1.73 M^2
GLUCOSE SERPL-MCNC: 85 MG/DL
HCT VFR BLD AUTO: 36.1 %
HDLC SERPL-MCNC: 41 MG/DL
HDLC SERPL: 21 %
HGB BLD-MCNC: 12 G/DL
LDLC SERPL CALC-MCNC: 133 MG/DL
LYMPHOCYTES # BLD AUTO: 1.6 K/UL
LYMPHOCYTES NFR BLD: 29.8 %
MCH RBC QN AUTO: 28.1 PG
MCHC RBC AUTO-ENTMCNC: 33.2 G/DL
MCV RBC AUTO: 85 FL
MONOCYTES # BLD AUTO: 0.4 K/UL
MONOCYTES NFR BLD: 6.9 %
NEUTROPHILS # BLD AUTO: 3.3 K/UL
NEUTROPHILS NFR BLD: 62.1 %
NONHDLC SERPL-MCNC: 154 MG/DL
PLATELET # BLD AUTO: 236 K/UL
PMV BLD AUTO: 10.3 FL
POTASSIUM SERPL-SCNC: 4 MMOL/L
PROT SERPL-MCNC: 7.5 G/DL
RBC # BLD AUTO: 4.27 M/UL
SODIUM SERPL-SCNC: 139 MMOL/L
TRIGL SERPL-MCNC: 105 MG/DL
TSH SERPL DL<=0.005 MIU/L-ACNC: 0.62 UIU/ML
WBC # BLD AUTO: 5.34 K/UL

## 2018-03-23 PROCEDURE — 85025 COMPLETE CBC W/AUTO DIFF WBC: CPT

## 2018-03-23 PROCEDURE — 82306 VITAMIN D 25 HYDROXY: CPT

## 2018-03-23 PROCEDURE — 36415 COLL VENOUS BLD VENIPUNCTURE: CPT

## 2018-03-23 PROCEDURE — 80061 LIPID PANEL: CPT

## 2018-03-23 PROCEDURE — 85651 RBC SED RATE NONAUTOMATED: CPT

## 2018-03-23 PROCEDURE — 80053 COMPREHEN METABOLIC PANEL: CPT

## 2018-03-23 PROCEDURE — 99999 PR PBB SHADOW E&M-EST. PATIENT-LVL III: CPT | Mod: PBBFAC,,, | Performed by: INTERNAL MEDICINE

## 2018-03-23 PROCEDURE — 99396 PREV VISIT EST AGE 40-64: CPT | Mod: S$GLB,,, | Performed by: INTERNAL MEDICINE

## 2018-03-23 PROCEDURE — 86140 C-REACTIVE PROTEIN: CPT

## 2018-03-23 PROCEDURE — 84443 ASSAY THYROID STIM HORMONE: CPT

## 2018-03-23 NOTE — PROGRESS NOTES
"Subjective:   Patient ID: Liliana Wright is a 42 y.o. female  Chief complaint:   Chief Complaint   Patient presents with    Annual Exam       HPI  Pt here for annual exam   Doing well - no more panic attacks   Not getting reg exercise    Hx of elevated esr and crp in past - currently, no joint pain or swelling, stiffness, fevers or chills, sore throat.     Taking weekly vit d and due for f/u lab  Vegan - taking b12 supplement and taking biotin   Due for derm f/u in may for morphea     Review of Systems    Objective:  Vitals:    03/23/18 0846   BP: 117/82   Pulse: 72   SpO2: 99%   Weight: 72.5 kg (159 lb 13.3 oz)   Height: 5' 1" (1.549 m)     Body mass index is 30.2 kg/m².    Physical Exam   Constitutional: She is oriented to person, place, and time. She appears well-developed and well-nourished.   HENT:   Head: Normocephalic and atraumatic.   Right Ear: External ear normal.   Left Ear: External ear normal.   Nose: Nose normal.   Mouth/Throat: Oropharynx is clear and moist. No oropharyngeal exudate.   No carotid bruits   Eyes: Conjunctivae and EOM are normal.   Neck: Neck supple. No thyromegaly present.   Cardiovascular: Normal rate, regular rhythm, normal heart sounds and intact distal pulses.    Pulmonary/Chest: Effort normal and breath sounds normal.   Abdominal: Soft. Bowel sounds are normal.   Musculoskeletal: She exhibits no edema or tenderness.   Lymphadenopathy:     She has no cervical adenopathy.   Neurological: She is alert and oriented to person, place, and time.   Skin: Skin is warm and dry.   Psychiatric: Her behavior is normal. Thought content normal.   Vitals reviewed.      Assessment:  1. Annual physical exam    2. Vitamin D deficiency    3. Morphea    4. PCO (polycystic ovaries)    5. Elevated sed rate        Plan:  Liliana was seen today for annual exam.    Diagnoses and all orders for this visit:    Annual physical exam  -     Vitamin D; Future  -     CBC auto differential; Future  -     " Comprehensive metabolic panel; Future  -     TSH; Future  -     Lipid panel; Future  Recommend daily sunscreen, cardiovascular exercise min 30 min 5 days per week. Seatbelts routinely.    Vitamin D deficiency  -     Vitamin D; Future  Cont supplement, check level    Morphea  Followed by derm    PCO (polycystic ovaries)    Elevated sed rate  -     C-reactive protein; Future  -     Sedimentation rate, manual; Future      Health Maintenance   Topic Date Due    Mammogram  02/08/2019    Pap Smear with HPV Cotest  04/28/2019    TETANUS VACCINE  03/23/2028    Influenza Vaccine  Addressed    Lipid Panel  Completed

## 2018-03-26 ENCOUNTER — TELEPHONE (OUTPATIENT)
Dept: INTERNAL MEDICINE | Facility: CLINIC | Age: 43
End: 2018-03-26

## 2018-03-26 DIAGNOSIS — E55.9 VITAMIN D DEFICIENCY: Primary | ICD-10-CM

## 2018-03-26 RX ORDER — ERGOCALCIFEROL 1.25 MG/1
50000 CAPSULE ORAL
Qty: 12 CAPSULE | Refills: 1 | Status: SHIPPED | OUTPATIENT
Start: 2018-03-26 | End: 2018-10-17 | Stop reason: SDUPTHER

## 2018-03-26 NOTE — TELEPHONE ENCOUNTER
Message sent to pt via my chart with lab results and updates to plan.     Schedule vit d lab in 6 months

## 2018-05-02 ENCOUNTER — PATIENT MESSAGE (OUTPATIENT)
Dept: OBSTETRICS AND GYNECOLOGY | Facility: CLINIC | Age: 43
End: 2018-05-02

## 2018-05-02 RX ORDER — HYDROQUINONE 40 MG/G
CREAM TOPICAL 2 TIMES DAILY
COMMUNITY
End: 2018-05-02 | Stop reason: SDUPTHER

## 2018-05-02 RX ORDER — HYDROQUINONE 40 MG/G
CREAM TOPICAL
Qty: 46 G | Refills: 0 | Status: SHIPPED | OUTPATIENT
Start: 2018-05-02 | End: 2018-06-05

## 2018-06-05 ENCOUNTER — OFFICE VISIT (OUTPATIENT)
Dept: OBSTETRICS AND GYNECOLOGY | Facility: CLINIC | Age: 43
End: 2018-06-05
Payer: COMMERCIAL

## 2018-06-05 VITALS
WEIGHT: 166.44 LBS | BODY MASS INDEX: 32.68 KG/M2 | HEIGHT: 60 IN | DIASTOLIC BLOOD PRESSURE: 78 MMHG | SYSTOLIC BLOOD PRESSURE: 102 MMHG

## 2018-06-05 DIAGNOSIS — Z01.419 ENCOUNTER FOR GYNECOLOGICAL EXAMINATION WITHOUT ABNORMAL FINDING: Primary | ICD-10-CM

## 2018-06-05 DIAGNOSIS — Z12.31 VISIT FOR SCREENING MAMMOGRAM: ICD-10-CM

## 2018-06-05 DIAGNOSIS — Z12.4 CERVICAL CANCER SCREENING: ICD-10-CM

## 2018-06-05 PROCEDURE — 99999 PR PBB SHADOW E&M-EST. PATIENT-LVL III: CPT | Mod: PBBFAC,,, | Performed by: OBSTETRICS & GYNECOLOGY

## 2018-06-05 PROCEDURE — 99396 PREV VISIT EST AGE 40-64: CPT | Mod: S$GLB,,, | Performed by: OBSTETRICS & GYNECOLOGY

## 2018-06-05 PROCEDURE — 88175 CYTOPATH C/V AUTO FLUID REDO: CPT

## 2018-06-05 PROCEDURE — 87624 HPV HI-RISK TYP POOLED RSLT: CPT

## 2018-06-05 RX ORDER — BIOTIN 1 MG
1000 TABLET ORAL 3 TIMES DAILY
COMMUNITY
End: 2018-12-23

## 2018-06-05 NOTE — PROGRESS NOTES
CC: Well woman exam    Liliana Wright is a 43 y.o. female  presents for well woman exam.  LMP: Patient's last menstrual period was 2018..  No GYN issues, problems, or complaints.      Past Medical History:   Diagnosis Date    Abnormal Pap smear     GEGE I on bx     Cervical high risk HPV (human papillomavirus) test positive     PCO (polycystic ovaries) 3/13/2014    Took Metformin during first two pregnancies, stopped before conception this pregnancy and does not want to take     Previous  section 3/13/2014    X 2, induction and FTP, 2nd preg PROM at 37wk with repeat     Rh negative status during pregnancy 3/14/2014    Rhogam at 28wk     Vegetarian 2017     Past Surgical History:   Procedure Laterality Date     SECTION      X 2    CHOLECYSTECTOMY      DILATION AND CURETTAGE OF UTERUS  2014    missed AB     OVARIAN CYST REMOVAL      with c/s      Social History     Social History    Marital status:      Spouse name: N/A    Number of children: N/A    Years of education: N/A     Occupational History     [employer]     Social History Main Topics    Smoking status: Never Smoker    Smokeless tobacco: Never Used    Alcohol use No    Drug use: No    Sexual activity: Yes     Partners: Male     Birth control/ protection: None      Comment:  to [facility]      Other Topics Concern    Are You Pregnant Or Think You May Be? No    Breast-Feeding No     Social History Narrative    From [place]    Moved to [facility] in          Family History   Problem Relation Age of Onset    Lupus Father     No Known Problems Mother     Breast cancer Maternal Grandmother 70    No Known Problems Sister     No Known Problems Brother     No Known Problems Daughter     No Known Problems Son     Alopecia Neg Hx     Diabetes Neg Hx     Hypertension Neg Hx     Heart disease Neg Hx     Stroke Neg Hx     Acne Neg Hx     Eczema Neg Hx     Psoriasis Neg Hx     Melanoma  Neg Hx     Ovarian cancer Neg Hx      OB History      Para Term  AB Living    4 3 3   1 2    SAB TAB Ectopic Multiple Live Births    1       2          /78   Ht 5' (1.524 m)   Wt 75.5 kg (166 lb 7.2 oz)   LMP 2018   BMI 32.51 kg/m²       ROS:  GENERAL: Denies weight gain or weight loss. Feeling well overall.   SKIN: Denies rash or lesions.   HEAD: Denies head injury or headache.   NODES: Denies enlarged lymph nodes.   CHEST: Denies chest pain or shortness of breath.   CARDIOVASCULAR: Denies palpitations or left sided chest pain.   ABDOMEN: No abdominal pain, constipation, diarrhea, nausea, vomiting or rectal bleeding.   URINARY: No frequency, dysuria, hematuria, or burning on urination.  REPRODUCTIVE: See HPI.   BREASTS: The patient performs breast self-examination and denies pain, lumps, or nipple discharge.   HEMATOLOGIC: No easy bruisability or excessive bleeding.   MUSCULOSKELETAL: Denies joint pain or swelling.   NEUROLOGIC: Denies syncope or weakness.   PSYCHIATRIC: Denies depression, anxiety or mood swings.    PHYSICAL EXAM:  APPEARANCE: Well nourished, well developed, in no acute distress.  AFFECT: WNL, alert and oriented x 3  SKIN: No acne or hirsutism  NECK: Neck symmetric without masses or thyromegaly  NODES: No inguinal, cervical, axillary, or femoral lymph node enlargement  CHEST: Good respiratory effect  ABDOMEN: Soft.  No tenderness or masses.  No hepatosplenomegaly.  No hernias.  Pfannenstiel incision healed.  BREASTS: Symmetrical, no skin changes or visible lesions.  No palpable masses, nipple discharge bilaterally.  PELVIC: Normal external genitalia without lesions.  Normal hair distribution.  Adequate perineal body, normal urethral meatus.  Vagina moist and well rugated without lesions or discharge.  Cervix pink, without lesions, discharge or tenderness.  No significant cystocele or rectocele.  Bimanual exam shows uterus to be normal size, regular, mobile and  nontender.  Adnexa without masses or tenderness.    EXTREMITIES: No edema.    Encounter for gynecological examination without abnormal finding    Visit for screening mammogram  -     Mammo Digital Screening Bilat with Tomosynthesis CAD; Future; Expected date: 06/05/2018    Cervical cancer screening  -     Liquid-based pap smear, screening  -     HPV High Risk Genotypes, PCR        Age specific counseling performed.     Papsmear with High Risk HPV cotest done today     Patient declines contraception/hormonal contraception     Patient was counseled today on A.C.S. Pap guidelines and recommendations for yearly pelvic exams, mammograms and monthly self breast exams; to see her PCP for other health maintenance.     Follow-up in about 1 year (around 6/5/2019) for Annual exam.      Ronaldo Jade IV, MD

## 2018-06-09 LAB
HPV16 AG SPEC QL: NEGATIVE
HPV16+18+H RISK 12 DNA CVX-IMP: NEGATIVE
HPV18 DNA SPEC QL NAA+PROBE: NEGATIVE

## 2018-06-24 ENCOUNTER — PATIENT MESSAGE (OUTPATIENT)
Dept: INTERNAL MEDICINE | Facility: CLINIC | Age: 43
End: 2018-06-24

## 2018-06-28 ENCOUNTER — OFFICE VISIT (OUTPATIENT)
Dept: INTERNAL MEDICINE | Facility: CLINIC | Age: 43
End: 2018-06-28
Attending: INTERNAL MEDICINE
Payer: COMMERCIAL

## 2018-06-28 VITALS
WEIGHT: 165.81 LBS | HEART RATE: 74 BPM | SYSTOLIC BLOOD PRESSURE: 128 MMHG | DIASTOLIC BLOOD PRESSURE: 80 MMHG | BODY MASS INDEX: 32.55 KG/M2 | HEIGHT: 60 IN

## 2018-06-28 DIAGNOSIS — F41.1 GAD (GENERALIZED ANXIETY DISORDER): ICD-10-CM

## 2018-06-28 DIAGNOSIS — K64.9 HEMORRHOIDS, UNSPECIFIED HEMORRHOID TYPE: Primary | ICD-10-CM

## 2018-06-28 DIAGNOSIS — K62.89 RECTAL PAIN: ICD-10-CM

## 2018-06-28 PROCEDURE — 99999 PR PBB SHADOW E&M-EST. PATIENT-LVL III: CPT | Mod: PBBFAC,,, | Performed by: INTERNAL MEDICINE

## 2018-06-28 PROCEDURE — 99214 OFFICE O/P EST MOD 30 MIN: CPT | Mod: S$GLB,,, | Performed by: INTERNAL MEDICINE

## 2018-06-28 RX ORDER — HYDROCORTISONE ACETATE 25 MG/1
25 SUPPOSITORY RECTAL 2 TIMES DAILY
Qty: 20 SUPPOSITORY | Refills: 0 | Status: SHIPPED | OUTPATIENT
Start: 2018-06-28 | End: 2018-07-08

## 2018-06-28 NOTE — PATIENT INSTRUCTIONS
For constipation:  Layer on theses treatments and continue them indefintely.  If not working, add another treatment while continuing the previous ones.  If stools too soft, remove one treatment and continue others.  Adjust as needed.    Fluids:  Drink at least 8 glasses of water daily  Drink one additional glass of water for each glass of caffineated liquid you drink daily  Fiber supplements:  benefiber powder to your liquids  Flax seed two teaspoons on top of high fiber cereal or other foods    Stool softeners:  Stool softeners such as colace up to three times daily (or as package recommends) with large glass of water.    Laxatives:  You may add laxatives when you are uncomfortable as directed by the package.

## 2018-06-28 NOTE — PROGRESS NOTES
Subjective:       Patient ID: Liliana Wright is a 43 y.o. female.    Chief Complaint: Rectal Problems     Liliana Wright is a 43 y.o.  female who presents for Rectal Problems  .  Buzzing sensation in her rectum x one week.  Described as a vibration which started when seated at work.  Lasts for several minutes, returns about 5 times a day.  Worse at work.  Accompanied by a burning sensation.  Reports bowel movements have been regular but feels incomplete evacuation.  Soft.  No fevers/ rash/chills.  LMP yesterday.  Reports history of self diagnosed IBS and lactose intolerance.  Occasional bleeding from hemorrhoids when straining, bright red, self limiting.    + anxiety, not interested in medications. Has stress at work, stopped exercising.       Review of Systems   Constitutional: Negative for chills and fever.   HENT: Negative for rhinorrhea and sore throat.    Respiratory: Negative for cough and shortness of breath.    Cardiovascular: Negative for chest pain and palpitations.   Gastrointestinal: Negative for nausea and vomiting.   Genitourinary: Negative for dysuria and hematuria.   Musculoskeletal: Negative for arthralgias and back pain.   Skin: Negative for color change and rash.   Neurological: Negative for weakness and numbness.   Psychiatric/Behavioral: Negative for agitation and dysphoric mood. The patient is nervous/anxious.        Patient Active Problem List   Diagnosis    PCO (polycystic ovaries)    Morphea       Past Medical History:   Diagnosis Date    Abnormal Pap smear     GEGE I on bx     Cervical high risk HPV (human papillomavirus) test positive     PCO (polycystic ovaries) 3/13/2014    Took Metformin during first two pregnancies, stopped before conception this pregnancy and does not want to take     Previous  section 3/13/2014    X 2, induction and FTP, 2nd preg PROM at 37wk with repeat     Rh negative status during pregnancy 3/14/2014    Rhogam at 28wk     Vegetarian 2017        Past Surgical History:   Procedure Laterality Date     SECTION      X 2    CHOLECYSTECTOMY      DILATION AND CURETTAGE OF UTERUS  2014    missed AB     OVARIAN CYST REMOVAL      with c/s        Family History   Problem Relation Age of Onset    Lupus Father     No Known Problems Mother     Breast cancer Maternal Grandmother 70    No Known Problems Sister     No Known Problems Brother     No Known Problems Daughter     No Known Problems Son     Alopecia Neg Hx     Diabetes Neg Hx     Hypertension Neg Hx     Heart disease Neg Hx     Stroke Neg Hx     Acne Neg Hx     Eczema Neg Hx     Psoriasis Neg Hx     Melanoma Neg Hx     Ovarian cancer Neg Hx        Social History   Substance Use Topics    Smoking status: Never Smoker    Smokeless tobacco: Never Used    Alcohol use No       Objective:   Blood pressure 128/80, pulse 74, height 5' (1.524 m), weight 75.2 kg (165 lb 12.6 oz).     Physical Exam   Constitutional: She is oriented to person, place, and time. She appears well-developed and well-nourished. No distress.   HENT:   Head: Normocephalic and atraumatic.   Genitourinary:   Genitourinary Comments: Increased rectal tone, small external hemorrhoid noted, no rash, no eryethema, + firm stool in vault   Neurological: She is alert and oriented to person, place, and time.       Prior labs reviewed  Assessment/Plan:        Liliana was seen today for rectal problems.    Diagnoses and all orders for this visit:    Hemorrhoids, unspecified hemorrhoid type  -     Ambulatory consult to Colorectal Surgery    Rectal pain  Comments:  treat constipation  follow up with colorectal department if no improvement  Orders:  -     Ambulatory consult to Colorectal Surgery    AHSAN (generalized anxiety disorder)  Comments:  discussed relaxation techniques  increasing exercise    Other orders  -     hydrocortisone (ANUSOL-HC) 25 mg suppository; Place 1 suppository (25 mg total) rectally 2 (two) times  daily. for 10 days

## 2018-07-02 NOTE — TELEPHONE ENCOUNTER
----- Message from Chelo Caban sent at 7/2/2018 12:13 PM CDT -----  Contact: Alexandra     Name of Who is Calling: Alexandra Huffman       What is the request in detail:  Alexandra with [facility] states the patient RX hydrocortisone (ANUSOL-HC) 25 mg  is not covered by patients insurance she would like to get an alterative called in Please contact to further discuss and advise          Can the clinic reply by [facility]: No      What Number to Call Back if not in [facility]: [phone]

## 2018-07-02 NOTE — TELEPHONE ENCOUNTER
Unable to electronically sign order due to issue with epic     Please call in cream in place of suppository due to insurance coverage -  Rx for anlapram-HC 2.5-1% - apply to affected area tid for 10 days   Disp 28g tube, 0 refills     Please notify pt once this is called in

## 2018-07-03 RX ORDER — HYDROCORTISONE ACETATE PRAMOXINE HCL 2.5; 1 G/100G; G/100G
CREAM TOPICAL 3 TIMES DAILY
Qty: 28.35 G | Refills: 0 | Status: SHIPPED | OUTPATIENT
Start: 2018-07-03 | End: 2018-09-05

## 2018-07-03 RX ORDER — HYDROCORTISONE ACETATE PRAMOXINE HCL 2.5; 1 G/100G; G/100G
CREAM TOPICAL 3 TIMES DAILY
Qty: 28 G | Refills: 0 | OUTPATIENT
Start: 2018-07-03 | End: 2018-07-13

## 2018-07-09 ENCOUNTER — OFFICE VISIT (OUTPATIENT)
Dept: SURGERY | Facility: CLINIC | Age: 43
End: 2018-07-09
Payer: COMMERCIAL

## 2018-07-09 VITALS
WEIGHT: 165.38 LBS | SYSTOLIC BLOOD PRESSURE: 129 MMHG | HEIGHT: 60 IN | HEART RATE: 72 BPM | BODY MASS INDEX: 32.47 KG/M2 | DIASTOLIC BLOOD PRESSURE: 83 MMHG

## 2018-07-09 DIAGNOSIS — K59.4 RECTAL SPASM: Primary | ICD-10-CM

## 2018-07-09 PROCEDURE — 99999 PR PBB SHADOW E&M-EST. PATIENT-LVL III: CPT | Mod: PBBFAC,,, | Performed by: NURSE PRACTITIONER

## 2018-07-09 PROCEDURE — 46600 DIAGNOSTIC ANOSCOPY SPX: CPT | Mod: S$GLB,,, | Performed by: NURSE PRACTITIONER

## 2018-07-09 PROCEDURE — 99203 OFFICE O/P NEW LOW 30 MIN: CPT | Mod: 25,S$GLB,, | Performed by: NURSE PRACTITIONER

## 2018-07-09 NOTE — LETTER
July 9, 2018      Nicki Cabrera MD  [address]  [address]  [facility] [address]           [facility]  [address]  [facility] [address]  Phone: [phone]          Patient: Liliana Wright   MR Number: [id number]   YOB: 1975   Date of Visit: 7/9/2018       Dear Dr. Nicki Cabrera:    Thank you for referring Liliana Wright to me for evaluation. Attached you will find relevant portions of my assessment and plan of care.    If you have questions, please do not hesitate to call me. I look forward to following Liliana Wright along with you.    Sincerely,    Lacey Brown, NP    Enclosure  CC:  No Recipients    If you would like to receive this communication electronically, please contact [email or web address] or [phone] to request more information on [facility] access.    For providers and/or their staff who would like to refer a patient to [facility], please contact us through our one-stop-shop provider referral line, [facility], at [phone].    If you feel you have received this communication in error or would no longer like to receive these types of communications, please e-mail [email or web address]

## 2018-07-09 NOTE — PROGRESS NOTES
"Subjective:       Patient ID: Liliana Wright is a 43 y.o. female.    Chief Complaint: Hemorrhoids    HPI   43 F who presents to clinic with complaints of rectal pain. For the past 3 weeks she feels a "burning, vibrating pain" inside her rectum. She saw her PCP who prescribed anusol suppositories which has given her minimal relief. She has a daily soft BM. No blood in stool, no pain or discomfort with BMs.     She has never had a colonoscopy  No family history of colon or rectal CA.    No prior rectal surgeries  Review of Systems   Constitutional: Negative for fatigue, fever and unexpected weight change.   Respiratory: Negative for shortness of breath.    Cardiovascular: Negative for chest pain.   Gastrointestinal: Positive for rectal pain. Negative for abdominal distention, abdominal pain, anal bleeding, blood in stool, constipation, diarrhea, nausea and vomiting.       Objective:      Physical Exam   Constitutional: She is oriented to person, place, and time. She appears well-developed and well-nourished. No distress.   Eyes: Conjunctivae and EOM are normal.   Pulmonary/Chest: Effort normal. No respiratory distress.   Abdominal: Soft. She exhibits no distension. There is no tenderness.   Genitourinary:   Genitourinary Comments: Normal perianal skin. eversion of anus revealed no abnormality or fissure, VICKIE revealed no masses, blood or stool in vault, normal sphincter tone, anoscopy revealed normal internal hemorrhoids with no bleeding or stigmata of same.     Musculoskeletal: Normal range of motion.   Neurological: She is alert and oriented to person, place, and time.   Skin: Skin is warm and dry.   Psychiatric: She has a normal mood and affect. Her behavior is normal.       Assessment:       1. Rectal spasm        Plan:       essentially negative rectal exam - trial of lidocaine/baclofin/valium suppositories   High Fiber Diet, increase water intake  Follow up in one month     "

## 2018-09-04 NOTE — PROGRESS NOTES
Subjective:      Patient ID: Liliana Wright is a 43 y.o. female.    Chief Complaint: Breast Pain (Right Breast)      HPI: (PF, EPF - 1-3) (Detailed, Comp, - 4) returning patient presents c/o pain right breast, last seen 2018 with c/o right breast pain with no abnormality appreciated on exam and negative imaging.  She reports pain had resolved and then returned 2 weeks ago, intermittent. She is uncertain if there are any palpable changes in her area of pain. Denies skin changes, spontaneous nipple discharge.      2018 bilat diag mmg and right breast US with no abnormality      Menarche at 19   first at 32  LMP 2018      Review of Systems  Objective:   Physical Exam   Pulmonary/Chest: Right breast exhibits no inverted nipple, no mass, no nipple discharge, no skin change and no tenderness. Left breast exhibits no inverted nipple, no mass, no nipple discharge, no skin change and no tenderness. Breasts are symmetrical. There is no breast swelling.   Lymphadenopathy:     She has no cervical adenopathy.        Right: No supraclavicular adenopathy present.        Left: No supraclavicular adenopathy present.     Assessment:       1. Breast pain        Plan:       No abnormality noted on exam today, reassurance provided. Encouraged weight loss and reduced caffeine intake  She desires to return here for annual mmg and CBE  Return in 2019 with screening mmg  Call for any interval palpable breast mass, pain, nipple discharge, skin changes or other breast related concerns

## 2018-09-05 ENCOUNTER — OFFICE VISIT (OUTPATIENT)
Dept: SURGERY | Facility: CLINIC | Age: 43
End: 2018-09-05
Payer: COMMERCIAL

## 2018-09-05 VITALS
HEART RATE: 95 BPM | WEIGHT: 169.88 LBS | HEIGHT: 60 IN | SYSTOLIC BLOOD PRESSURE: 123 MMHG | TEMPERATURE: 99 F | DIASTOLIC BLOOD PRESSURE: 70 MMHG | BODY MASS INDEX: 33.35 KG/M2

## 2018-09-05 DIAGNOSIS — N64.4 BREAST PAIN: Primary | ICD-10-CM

## 2018-09-05 DIAGNOSIS — Z12.39 BREAST CANCER SCREENING: ICD-10-CM

## 2018-09-05 PROCEDURE — 99212 OFFICE O/P EST SF 10 MIN: CPT | Mod: S$GLB,,, | Performed by: NURSE PRACTITIONER

## 2018-09-05 PROCEDURE — 99999 PR PBB SHADOW E&M-EST. PATIENT-LVL III: CPT | Mod: PBBFAC,,, | Performed by: NURSE PRACTITIONER

## 2018-09-05 RX ORDER — MAGNESIUM 200 MG
TABLET ORAL
COMMUNITY
End: 2018-12-23

## 2018-09-05 NOTE — MR AVS SNAPSHOT
[facility]  [address]  [place] [address]  Phone: [phone]  Fax: [phone]                  Liliana Wright   2017 11:00 AM   Office Visit    Description:  Female : 1975   Provider:  Jenna Scott MD   Department:  [facility]           Reason for Visit     Foot Swelling           Diagnoses this Visit        Comments    Chronic pain of left ankle    -  Primary            To Do List           Future Appointments        Provider Department Dept Phone    2017 12:15 PM [facility] XR3 [facility] [phone]    5/10/2017 10:00 AM Belle Levine PA-C [facility] - [facility]oped[facility] [phone]    2017 9:30 AM Ronaldo Jade IV, MD [facility] [address] [phone]      Goals (5 Years of Data)     None      PURCHASE these Medications (No prescription required)        Start End    ranitidine (ZANTAC 75) 75 MG tablet 2017    Sig - Route: Take 1 tablet (75 mg total) by mouth 2 (two) times daily. - Oral    Class: OTC      [facility]     [facility] Nurse Care Line -  Assistance  Unless otherwise directed by your provider, please contact [facility], our nurse care line that is available for  assistance.     Registered nurses in the [facility] Center provide: appointment scheduling, clinical advisement, health education, and other advisory services.  Call: [phone] (toll free)               Medications           Message regarding Medications     Verify the changes and/or additions to your medication regime listed below are the same as discussed with your clinician today.  If any of these changes or additions are incorrect, please notify your healthcare provider.        START taking these NEW medications        Refills    ranitidine (ZANTAC 75) 75 MG tablet 0    Sig: Take 1 tablet (75 mg total) by mouth 2 (two) times daily.    Class: OTC    Route: Oral           Verify that the  Problem Dx:  Mediastinal mass      Interval History: Started on standing Oxy for pain s/p biopsy. This AM, was nauseas so given zofran w/ some help but continues to feel dizzy. PO well last night but only minimal this AM.       Change from previous past medical, family or social history:	[x] No	[] Yes:    REVIEW OF SYSTEMS  All review of systems negative, except for those marked:  General:		[] Abnormal:  Pulmonary:		[] Abnormal:  Cardiac:			[] Abnormal:  Gastrointestinal:	            [] Abnormal:  ENT:			[] Abnormal:  Renal/Urologic:		[] Abnormal:  Musculoskeletal		[] Abnormal:  Endocrine:		[] Abnormal:  Hematologic:		[] Abnormal:  Neurologic:		[] Abnormal:  Skin:			[] Abnormal:  Allergy/Immune		[] Abnormal:  Psychiatric:		[] Abnormal:      Allergies    No Known Allergies    Intolerances      acetaminophen   Oral Tab/Cap - Peds. 500 milliGRAM(s) Oral every 6 hours PRN  oxyCODONE   IR Oral Tab/Cap - Peds 7.5 milliGRAM(s) Oral every 6 hours PRN  sodium chloride 0.9%. - Pediatric 1000 milliLiter(s) IV Continuous <Continuous>      DIET:  Pediatric Regular    Vital Signs Last 24 Hrs  T(C): 36.9 (05 Sep 2018 10:05), Max: 37.7 (04 Sep 2018 18:07)  T(F): 98.4 (05 Sep 2018 10:05), Max: 99.8 (04 Sep 2018 18:07)  HR: 92 (05 Sep 2018 10:05) (70 - 98)  BP: 93/54 (05 Sep 2018 10:05) (93/54 - 105/54)  BP(mean): --  RR: 20 (05 Sep 2018 10:05) (20 - 20)  SpO2: 99% (05 Sep 2018 10:05) (99% - 100%)  Daily     Daily   I&O's Summary    04 Sep 2018 07:01  -  05 Sep 2018 07:00  --------------------------------------------------------  IN: 1080 mL / OUT: 400 mL / NET: 680 mL    05 Sep 2018 07:01  -  05 Sep 2018 11:19  --------------------------------------------------------  IN: 160 mL / OUT: 0 mL / NET: 160 mL      Pain Score (0-10):		Lansky/Karnofsky Score:     PATIENT CARE ACCESS  [] Peripheral IV  [] Central Venous Line	[] R	[] L	[] IJ	[] Fem	[] SC			[] Placed:  [] PICC:				[] Broviac		[] Mediport  [] Urinary Catheter, Date Placed:  [] Necessity of urinary, arterial, and venous catheters discussed    PHYSICAL EXAM  All physical exam findings normal, except those marked:  Constitutional:	Normal: well appearing, in no apparent distress  .		[] Abnormal:  Eyes		Normal: no conjunctival injection, symmetric gaze  .		[] Abnormal:  ENT:		Normal: mucus membranes moist, no mouth sores or mucosal bleeding, normal .  .		dentition, symmetric facies.  .		[] Abnormal:               Mucositis NCI grading scale                [] Grade 0: None                [] Grade 1: (mild) Painless ulcers, erythema, or mild soreness in the absence of lesions                [] Grade 2: (moderate) Painful erythema, oedema, or ulcers but eating or swallowing possible                [] Grade 3: (severe) Painful erythema, odema or ulcers requiring IV hydration                [] Grade 4: (life-threatening) Severe ulceration or requiring parenteral or enteral nutritional support   Neck		Normal: no thyromegaly or masses appreciated  .		[] Abnormal:  Cardiovascular	Normal: regular rate, normal S1, S2, no murmurs, rubs or gallops  .		[] Abnormal:  Respiratory	Normal: clear to auscultation bilaterally, no wheezing  .		[] Abnormal:  Abdominal	Normal: normoactive bowel sounds, soft, NT, no hepatosplenomegaly, no   .		masses  .		[] Abnormal:  		   .		[] Abnormal: [x] not done  Lymphatic	Normal: no adenopathy appreciated  .		[] Abnormal:  Extremities	Normal: FROM x4, no cyanosis or edema, symmetric pulses  .		[] Abnormal:  Skin		Normal: normal appearance, no rash, nodules, vesicles, ulcers or erythema  .		[] Abnormal:  Neurologic	Normal: no focal deficits, gait normal and normal motor exam.  .		[] Abnormal:  Psychiatric	Normal: affect appropriate  		[] Abnormal:  Musculoskeletal		Normal: full range of motion and no deformities appreciated, no masses   .			and normal strength in all extremities.  .			[] Abnormal:    Lab Results:  CBC  CBC Full  -  ( 05 Sep 2018 08:45 )  WBC Count : 17.03 K/uL  Hemoglobin : 10.3 g/dL  Hematocrit : 32.4 %  Platelet Count - Automated : 393 K/uL  Mean Cell Volume : 76.1 fL  Mean Cell Hemoglobin : 24.2 pg  Mean Cell Hemoglobin Concentration : 31.8 %  Auto Neutrophil # : 13.27 K/uL  Auto Lymphocyte # : 1.68 K/uL  Auto Monocyte # : 1.28 K/uL  Auto Eosinophil # : 0.67 K/uL  Auto Basophil # : 0.04 K/uL  Auto Neutrophil % : 78.0 %  Auto Lymphocyte % : 9.9 %  Auto Monocyte % : 7.5 %  Auto Eosinophil % : 3.9 %  Auto Basophil % : 0.2 %    .		Differential:	[x] Automated		[] Manual  Chemistry  09-05    136  |  99  |  9   ----------------------------<  100<H>  3.9   |  24  |  0.76    Ca    8.9      05 Sep 2018 08:45  Phos  3.8     09-05  Mg     1.9     09-05    TPro  7.5  /  Alb  2.7<L>  /  TBili  0.4  /  DBili  x   /  AST  14  /  ALT  21  /  AlkPhos  102  09-05    LIVER FUNCTIONS - ( 05 Sep 2018 08:45 )  Alb: 2.7 g/dL / Pro: 7.5 g/dL / ALK PHOS: 102 u/L / ALT: 21 u/L / AST: 14 u/L / GGT: x           PT/INR - ( 05 Sep 2018 08:45 )   PT: 18.4 SEC;   INR: 1.59          PTT - ( 05 Sep 2018 08:45 )  PTT:39.9 SEC      MICROBIOLOGY/CULTURES:    RADIOLOGY RESULTS:    Toxicities (with grade)  1.  2.  3.  4. below list of medications is an accurate representation of the medications you are currently taking.  If none reported, the list may be blank. If incorrect, please contact your healthcare provider. Carry this list with you in case of emergency.           Current Medications     biotin 300 mcg Tab Take 1 tablet by mouth once daily.    ferrous gluconate (FERGON) 325 MG Tab Take 325 mg by mouth daily with breakfast.    vitamin D 1000 units Tab Take 185 mg by mouth once daily.    ergocalciferol (ERGOCALCIFEROL) 50,000 unit Cap Take 1 capsule (50,000 Units total) by mouth every 7 days.    hydroquinone 4 % Crea Apply topically 2 (two) times daily.    ranitidine (ZANTAC 75) 75 MG tablet Take 1 tablet (75 mg total) by mouth 2 (two) times daily.           Clinical Reference Information           Your Vitals Were     BP Pulse Height Weight Last Period SpO2    100/72 (BP Location: Right arm, Patient Position: Sitting, BP Method: Manual) 69 5' (1.524 m) 77.5 kg (170 lb 13.7 oz) 04/19/2017 99%    BMI                33.37 kg/m2          Blood Pressure          Most Recent Value    BP  100/72      Allergies as of 4/26/2017     No Known Allergies      Immunizations Administered on Date of Encounter - 4/26/2017     None      Orders Placed During Today's Visit      Normal Orders This Visit    Ambulatory Referral to Orthopedics     Future Labs/Procedures Expected by Expires    X-Ray Ankle Complete 3 View Left  4/26/2017 4/26/2018      Instructions    Your test results will be communicated to you via: [facility], Telephone or Letter.  If you have not received your test results within one week. Please contact the clinic at [phone].      Take aleve every 12 hours with food and zantac for 7-14 days.   Ankle brace  Rest, ice, elevate leg when resting           Language Assistance Services     ATTENTION: Language assistance services are available, free of charge. Please call [phone].      ATENCIÓN: shaneka Harrell  disposición servicios gratuitos de asistencia lingüística. Julius al [phone].     JULIA Ý: N?u b?n nói Ti?ng Vi?t, có các d?ch v? h? tr? ngôn ng? mi?n phí dành cho b?n. G?i s? [phone].         [facility] complies with applicable Federal civil rights laws and does not discriminate on the basis of race, color, national origin, age, disability, or sex.

## 2018-10-17 RX ORDER — ERGOCALCIFEROL 1.25 MG/1
CAPSULE ORAL
Qty: 4 CAPSULE | Refills: 0 | Status: SHIPPED | OUTPATIENT
Start: 2018-10-17 | End: 2018-12-23

## 2018-10-20 ENCOUNTER — LAB VISIT (OUTPATIENT)
Dept: LAB | Facility: HOSPITAL | Age: 43
End: 2018-10-20
Attending: INTERNAL MEDICINE
Payer: COMMERCIAL

## 2018-10-20 DIAGNOSIS — E55.9 VITAMIN D DEFICIENCY: ICD-10-CM

## 2018-10-20 PROCEDURE — 82306 VITAMIN D 25 HYDROXY: CPT

## 2018-10-20 PROCEDURE — 36415 COLL VENOUS BLD VENIPUNCTURE: CPT

## 2018-10-21 LAB — 25(OH)D3+25(OH)D2 SERPL-MCNC: 23 NG/ML

## 2018-10-22 ENCOUNTER — TELEPHONE (OUTPATIENT)
Dept: INTERNAL MEDICINE | Facility: CLINIC | Age: 43
End: 2018-10-22

## 2018-10-22 DIAGNOSIS — E55.9 VITAMIN D DEFICIENCY: Primary | ICD-10-CM

## 2018-10-22 RX ORDER — ERGOCALCIFEROL 1.25 MG/1
CAPSULE ORAL
Qty: 24 CAPSULE | Refills: 0 | Status: SHIPPED | OUTPATIENT
Start: 2018-10-22 | End: 2018-12-23

## 2018-10-22 NOTE — TELEPHONE ENCOUNTER
Message sent to pt via my chart with lab results and updates to plan.   Please schedule vit d in 3 months

## 2018-11-09 RX ORDER — LOTEPREDNOL ETABONATE 2 MG/ML
SUSPENSION/ DROPS OPHTHALMIC
Refills: 0 | COMMUNITY
Start: 2018-08-06 | End: 2018-12-23

## 2018-12-23 ENCOUNTER — HOSPITAL ENCOUNTER (EMERGENCY)
Facility: HOSPITAL | Age: 43
Discharge: HOME | End: 2018-12-23
Attending: EMERGENCY MEDICINE
Payer: COMMERCIAL

## 2018-12-23 ENCOUNTER — NURSE TRIAGE (OUTPATIENT)
Dept: ADMINISTRATIVE | Facility: CLINIC | Age: 43
End: 2018-12-23

## 2018-12-23 VITALS
HEIGHT: 61 IN | SYSTOLIC BLOOD PRESSURE: 127 MMHG | WEIGHT: 160 LBS | BODY MASS INDEX: 30.21 KG/M2 | OXYGEN SATURATION: 100 % | DIASTOLIC BLOOD PRESSURE: 71 MMHG | TEMPERATURE: 98 F | HEART RATE: 99 BPM | RESPIRATION RATE: 16 BRPM

## 2018-12-23 DIAGNOSIS — M25.579 ANKLE PAIN: ICD-10-CM

## 2018-12-23 PROCEDURE — 99282 EMERGENCY DEPT VISIT SF MDM: CPT | Mod: ,,, | Performed by: PHYSICIAN ASSISTANT

## 2018-12-23 PROCEDURE — 99283 EMERGENCY DEPT VISIT LOW MDM: CPT | Mod: 25

## 2018-12-23 PROCEDURE — 25000003 PHARM REV CODE 250: Performed by: PHYSICIAN ASSISTANT

## 2018-12-23 RX ORDER — NAPROXEN 500 MG/1
500 TABLET ORAL
Status: COMPLETED | OUTPATIENT
Start: 2018-12-23 | End: 2018-12-23

## 2018-12-23 RX ADMIN — NAPROXEN 500 MG: 500 TABLET ORAL at 04:12

## 2018-12-23 NOTE — ED PROVIDER NOTES
Encounter Date: 2018       History     Chief Complaint   Patient presents with    Ankle Pain     pain in left ankle. fell 3 wks ago inuring rt knee.  Broke left ankle 25 yrs ago     Patient is a 43-year-old female with history of PCOS who presents the ED for urgent evaluation of left ankle pain. Patient reports she was running errands with her children yesterday when she began to experience non-radiating pain at the lateral ankle and swelling. She denies any preceding injury or event. She has not taken any medication for the pain. She denies numbness/tingling, weakness, inability to bear weight, rashes or wounds, or hip or knee pain. She does report an ankle fracture to the left ankle in  without surgical intervention.       The history is provided by the patient.     Review of patient's allergies indicates:   Allergen Reactions    Robitussin [guaifenesin] Anaphylaxis and Palpitations    Sudafed cough      Past Medical History:   Diagnosis Date    Abnormal Pap smear     GEGE I on bx     Cervical high risk HPV (human papillomavirus) test positive     PCO (polycystic ovaries) 3/13/2014    Took Metformin during first two pregnancies, stopped before conception this pregnancy and does not want to take     Previous  section 3/13/2014    X 2, induction and FTP, 2nd preg PROM at 37wk with repeat     Rh negative status during pregnancy 3/14/2014    Rhogam at 28wk     Vegetarian 2017     Past Surgical History:   Procedure Laterality Date     SECTION      X 2    CHOLECYSTECTOMY      D & C (SUCTION), D&E 2nd trimester loss, N/A 10/31/2014    Performed by [facility]onaldo Jade IV, MD at [facility] OR    DILATION AND CURETTAGE OF UTERUS  2014    missed AB     DILATION AND CURETTAGE, UTERUS N/A 2014    Performed by [facility], [facility] at [facility] OR    OVARIAN CYST REMOVAL      with c/s      Family History   Problem Relation Age of Onset    Lupus Father     No Known Problems Mother      Breast cancer Maternal Grandmother 70    No Known Problems Sister     No Known Problems Brother     No Known Problems Daughter     No Known Problems Son     Alopecia Neg Hx     Diabetes Neg Hx     Hypertension Neg Hx     Heart disease Neg Hx     Stroke Neg Hx     Acne Neg Hx     Eczema Neg Hx     Psoriasis Neg Hx     Melanoma Neg Hx     Ovarian cancer Neg Hx      Social History     Tobacco Use    Smoking status: Never Smoker    Smokeless tobacco: Never Used   Substance Use Topics    Alcohol use: No    Drug use: No     Review of Systems   Constitutional: Negative for chills and fever.   HENT: Negative for sore throat.    Eyes: Negative for visual disturbance.   Respiratory: Negative for shortness of breath.    Cardiovascular: Negative for chest pain and leg swelling.   Gastrointestinal: Negative for abdominal pain.   Genitourinary: Negative for dysuria.   Musculoskeletal: Positive for arthralgias and joint swelling. Negative for gait problem, myalgias and neck pain.   Skin: Negative for wound.   Neurological: Negative for weakness and numbness.   Psychiatric/Behavioral: Negative for confusion.       Physical Exam     Initial Vitals [12/23/18 1537]   BP Pulse Resp Temp SpO2   127/71 99 16 98 °F (36.7 °C) 100 %      MAP       --         Physical Exam    Nursing note and vitals reviewed.  Constitutional: She appears well-developed and well-nourished. She is not diaphoretic. No distress.   HENT:   Head: Normocephalic and atraumatic.   Mouth/Throat: Oropharynx is clear and moist. No oropharyngeal exudate.   Eyes: Conjunctivae and EOM are normal. Pupils are equal, round, and reactive to light.   Neck: Normal range of motion. Neck supple.   Cardiovascular: Normal rate, regular rhythm, normal heart sounds and intact distal pulses.   Pulmonary/Chest: Breath sounds normal. No respiratory distress.   Abdominal: Soft. Bowel sounds are normal.   Musculoskeletal:   Mild tenderness noted to the posterior  lateral malleolus with mild soft tissue swelling to the area.  No midfoot, digital, medial malleolar, or distal fibular tenderness.   Normal gait.   Neurological: She is alert and oriented to person, place, and time. She has normal strength. No sensory deficit. GCS score is 15. GCS eye subscore is 4. GCS verbal subscore is 5. GCS motor subscore is 6.   Skin: Skin is warm and dry. No rash noted. No erythema.   Psychiatric: She has a normal mood and affect. Thought content normal.         ED Course   Procedures  Labs Reviewed - No data to display       Imaging Results    None          Medical Decision Making:   Initial Assessment:   43-yo female with no pertinent PMHx presents to the ED for urgent evaluation of Left lateral ankle pain beginning yesterday. She denies preceding injury or trauma. PE reveals a non-toxic, afebrile, well-appearing female in no acute distress. VSS. There is mild tenderness noted to the left lateral malleolus with mild soft tissue swelling. She has FROM and strength of BLE's with intact sensation. 2+ DP and PT pulses. She is able to bear weight and ambulate without difficulty.   Differential Diagnosis:   DDx includes but is not limited to: ankle sprain, ankle fracture, ligament tear/injury, cellulitis.   Clinical Tests:   Radiological Study: Ordered and Reviewed  ED Management:  Will obtain X-ray of the left ankle. Will apply ice to the affected area and elevate. Will give Naproxen for pain relief and reassess.    X-ray is without evidence of fracture or subluxation. Will discharge patient home with instructions R.I.C.E therapy with OTC Ibuprofen or Tylenol for pain relief. Patient advised to follow-up with her PCP. ED return precautions given. Patient verbalized understanding and is agreeable. I have discussed patient case with my supervisory physician, who is in agreement with my assessment and plan.                        Clinical Impression:   The encounter diagnosis was Ankle  pain.      Disposition:   Disposition: Discharged  Condition: Stable                        Maddy Payan PA-C  12/23/18 [id number]

## 2018-12-23 NOTE — DISCHARGE INSTRUCTIONS
Take over-the-counter Ibuprofen or Tylenol as directed for pain relief.  Apply ice compresses and elevate the leg when possible.  Follow-up with your family doctor.   Return to the ED for any concerning symptoms.

## 2018-12-23 NOTE — ED TRIAGE NOTES
Presents to ER with complaint of pain to her left ankle without injury.  Patient's name and date of birth checked and is correct.    LOC: The patient is awake, alert, and oriented to place, time, situation. Affect is appropriate.  Speech is appropriate and clear.      APPEARANCE: Patient resting comfortably, reporting palpation, light headedness,  in no acute distress.  Patient is clean and well groomed.     SKIN: The skin is warm and dry; color consistent with ethnicity.  Patient has normal skin turgor and moist mucus membranes.  Skin intact; no breakdown or bruising noted.      MUSCULOSKELETAL: Patient moving upper and lower extremities without difficulty.  Swelling noted to left ankle.     RESPIRATORY: Airway is open and patent. Respirations spontaneous, even, easy, and non-labored.  Patient has a normal effort and rate.  No accessory muscle use noted. Denies cough.  BS clear.     CARDIAC:  No peripheral edema noted. No complaints of chest pain.       ABDOMEN: Soft and non tender to palpation.  No distention noted.      NEUROLOGIC: Eyes open spontaneously.  Behavior appropriate to situation.  Follows commands; facial expression symmetrical.  Purposeful motor response noted; normal sensation in all extremities.

## 2019-01-27 ENCOUNTER — TELEPHONE (OUTPATIENT)
Dept: INTERNAL MEDICINE | Facility: CLINIC | Age: 44
End: 2019-01-27

## 2019-01-28 RX ORDER — ERGOCALCIFEROL 1.25 MG/1
CAPSULE ORAL
Qty: 24 CAPSULE | Refills: 0 | Status: SHIPPED | OUTPATIENT
Start: 2019-01-28 | End: 2019-09-13

## 2019-02-18 ENCOUNTER — OFFICE VISIT (OUTPATIENT)
Dept: DERMATOLOGY | Facility: CLINIC | Age: 44
End: 2019-02-18
Payer: COMMERCIAL

## 2019-02-18 DIAGNOSIS — D17.0 LIPOMA OF SCALP: ICD-10-CM

## 2019-02-18 DIAGNOSIS — M67.472 GANGLION CYST OF LEFT FOOT: Primary | ICD-10-CM

## 2019-02-18 PROCEDURE — 99999 PR PBB SHADOW E&M-EST. PATIENT-LVL II: ICD-10-PCS | Mod: PBBFAC,,, | Performed by: DERMATOLOGY

## 2019-02-18 PROCEDURE — 99999 PR PBB SHADOW E&M-EST. PATIENT-LVL II: CPT | Mod: PBBFAC,,, | Performed by: DERMATOLOGY

## 2019-02-18 PROCEDURE — 99213 OFFICE O/P EST LOW 20 MIN: CPT | Mod: S$GLB,,, | Performed by: DERMATOLOGY

## 2019-02-18 PROCEDURE — 99213 PR OFFICE/OUTPT VISIT, EST, LEVL III, 20-29 MIN: ICD-10-PCS | Mod: S$GLB,,, | Performed by: DERMATOLOGY

## 2019-02-18 NOTE — PROGRESS NOTES
Subjective:       Patient ID:  Liliana Wright is a 43 y.o. female who presents for   Chief Complaint   Patient presents with    Lesion     Pt presnets for fu bx proven lipoma forehead.  No change in 2 years per pt.  Also has lesion left great toe. X 1 month.  Denies pain or bleeding. Maybe growing.  No tx.      Lesion         Review of Systems   Constitutional: Negative for fever, chills, fatigue and malaise.   Hematologic/Lymphatic: Does not bruise/bleed easily.        Objective:    Physical Exam   Constitutional: She appears well-developed and well-nourished. No distress.   Neurological: She is alert and oriented to person, place, and time. She is not disoriented.   Psychiatric: She has a normal mood and affect.   Skin:   Areas Examined (abnormalities noted in diagram):   Scalp / Hair Palpated and Inspected  Head / Face Inspection Performed  Nails and Digits Inspection Performed                  Diagram Legend     Erythematous scaling macule/papule c/w actinic keratosis       Vascular papule c/w angioma      Pigmented verrucoid papule/plaque c/w seborrheic keratosis      Yellow umbilicated papule c/w sebaceous hyperplasia      Irregularly shaped tan macule c/w lentigo     1-2 mm smooth white papules consistent with Milia      Movable subcutaneous cyst with punctum c/w epidermal inclusion cyst      Subcutaneous movable cyst c/w pilar cyst      Firm pink to brown papule c/w dermatofibroma      Pedunculated fleshy papule(s) c/w skin tag(s)      Evenly pigmented macule c/w junctional nevus     Mildly variegated pigmented, slightly irregular-bordered macule c/w mildly atypical nevus      Flesh colored to evenly pigmented papule c/w intradermal nevus       Pink pearly papule/plaque c/w basal cell carcinoma      Erythematous hyperkeratotic cursted plaque c/w SCC      Surgical scar with no sign of skin cancer recurrence      Open and closed comedones      Inflammatory papules and pustules      Verrucoid papule  consistent consistent with wart     Erythematous eczematous patches and plaques     Dystrophic onycholytic nail with subungual debris c/w onychomycosis     Umbilicated papule    Erythematous-base heme-crusted tan verrucoid plaque consistent with inflamed seborrheic keratosis     Erythematous Silvery Scaling Plaque c/w Psoriasis     See annotation      Assessment / Plan:        Ganglion cyst of left foot  -     Ambulatory Referral to Orthopedics    Lipoma of scalp  S/p bx.    Unchanged since last visit, asymptomatic  Will continue to monitor    FINAL PATHOLOGIC DIAGNOSIS  [facility] DIAGNOSIS:  MID FRONTAL SCALP:  -HISTOLOGIC FINDINGS CONSISTENT WITH LIPOMA.  Sidney Platt MD, FACP  Note: Report attached.  Performing location:  [facility], [place] [address]  Diagnosed by: Juliann Solis M.D.  Electronically signed by: Denita Adan M.D.  (Electronically Signed: 2016-03-17 09:58:38)           Follow-up in about 1 year (around 2/18/2020) for recheck lesion on frontal scalp s/p bx .

## 2019-02-22 ENCOUNTER — OFFICE VISIT (OUTPATIENT)
Dept: OPHTHALMOLOGY | Facility: CLINIC | Age: 44
End: 2019-02-22
Payer: COMMERCIAL

## 2019-02-22 DIAGNOSIS — B30.9 VIRAL CONJUNCTIVAL DISEASE: Primary | ICD-10-CM

## 2019-02-22 PROCEDURE — 99999 PR PBB SHADOW E&M-EST. PATIENT-LVL II: CPT | Mod: PBBFAC,,, | Performed by: OPHTHALMOLOGY

## 2019-02-22 PROCEDURE — 99999 PR PBB SHADOW E&M-EST. PATIENT-LVL II: ICD-10-PCS | Mod: PBBFAC,,, | Performed by: OPHTHALMOLOGY

## 2019-02-22 PROCEDURE — 92002 PR EYE EXAM, NEW PATIENT,INTERMED: ICD-10-PCS | Mod: S$GLB,,, | Performed by: OPHTHALMOLOGY

## 2019-02-22 PROCEDURE — 92002 INTRM OPH EXAM NEW PATIENT: CPT | Mod: S$GLB,,, | Performed by: OPHTHALMOLOGY

## 2019-02-22 RX ORDER — PREDNISOLONE ACETATE 10 MG/ML
1 SUSPENSION/ DROPS OPHTHALMIC 3 TIMES DAILY
Qty: 5 ML | Refills: 0 | Status: SHIPPED | OUTPATIENT
Start: 2019-02-22 | End: 2019-08-13

## 2019-02-22 NOTE — PROGRESS NOTES
HPI     Pt states that she woke up this morning with OD crusted together and red.   Pt is also having itching and believes that her upper lid is swollen. No   vision changes OU. Pt reports that about 2 days ago she was reaching up in   her cabinet and a ziploc bag did hit her in OD. Pt admits to having dry   eyes.    Pt will use tears prn ou    Last edited by Chel Zhang on 2/22/2019 11:26 AM. (History)            Assessment /Plan     For exam results, see Encounter Report.    Viral conjunctival disease      The above diagnosis was explained to the patient and treatment initiated as prescribed.  All questions were answered and the patient is instructed to follow up if symptoms do not resolve or worsen.  PF tid for symptomatic relief

## 2019-03-01 ENCOUNTER — OFFICE VISIT (OUTPATIENT)
Dept: INTERNAL MEDICINE | Facility: CLINIC | Age: 44
End: 2019-03-01
Attending: INTERNAL MEDICINE
Payer: COMMERCIAL

## 2019-03-01 ENCOUNTER — HOSPITAL ENCOUNTER (OUTPATIENT)
Dept: RADIOLOGY | Facility: HOSPITAL | Age: 44
Discharge: HOME | End: 2019-03-01
Attending: OBSTETRICS & GYNECOLOGY
Payer: COMMERCIAL

## 2019-03-01 VITALS
DIASTOLIC BLOOD PRESSURE: 82 MMHG | BODY MASS INDEX: 30.8 KG/M2 | OXYGEN SATURATION: 99 % | HEART RATE: 74 BPM | HEIGHT: 61 IN | SYSTOLIC BLOOD PRESSURE: 120 MMHG | WEIGHT: 163.13 LBS

## 2019-03-01 DIAGNOSIS — R79.82 ELEVATED C-REACTIVE PROTEIN (CRP): ICD-10-CM

## 2019-03-01 DIAGNOSIS — F41.9 ANXIETY: ICD-10-CM

## 2019-03-01 DIAGNOSIS — E55.9 VITAMIN D DEFICIENCY: ICD-10-CM

## 2019-03-01 DIAGNOSIS — R70.0 ELEVATED SED RATE: ICD-10-CM

## 2019-03-01 DIAGNOSIS — Z12.31 VISIT FOR SCREENING MAMMOGRAM: ICD-10-CM

## 2019-03-01 DIAGNOSIS — L94.0 MORPHEA: ICD-10-CM

## 2019-03-01 DIAGNOSIS — Z00.00 ANNUAL PHYSICAL EXAM: Primary | ICD-10-CM

## 2019-03-01 PROCEDURE — 77063 BREAST TOMOSYNTHESIS BI: CPT | Mod: 26,,, | Performed by: RADIOLOGY

## 2019-03-01 PROCEDURE — 99999 PR PBB SHADOW E&M-EST. PATIENT-LVL III: CPT | Mod: PBBFAC,,, | Performed by: INTERNAL MEDICINE

## 2019-03-01 PROCEDURE — 99396 PREV VISIT EST AGE 40-64: CPT | Mod: S$GLB,,, | Performed by: INTERNAL MEDICINE

## 2019-03-01 PROCEDURE — 77067 MAMMO DIGITAL SCREENING BILAT WITH TOMOSYNTHESIS_CAD: ICD-10-PCS | Mod: 26,,, | Performed by: RADIOLOGY

## 2019-03-01 PROCEDURE — 99396 PR PREVENTIVE VISIT,EST,40-64: ICD-10-PCS | Mod: S$GLB,,, | Performed by: INTERNAL MEDICINE

## 2019-03-01 PROCEDURE — 77067 SCR MAMMO BI INCL CAD: CPT | Mod: 26,,, | Performed by: RADIOLOGY

## 2019-03-01 PROCEDURE — 99999 PR PBB SHADOW E&M-EST. PATIENT-LVL III: ICD-10-PCS | Mod: PBBFAC,,, | Performed by: INTERNAL MEDICINE

## 2019-03-01 PROCEDURE — 77063 MAMMO DIGITAL SCREENING BILAT WITH TOMOSYNTHESIS_CAD: ICD-10-PCS | Mod: 26,,, | Performed by: RADIOLOGY

## 2019-03-01 PROCEDURE — 77067 SCR MAMMO BI INCL CAD: CPT | Mod: TC

## 2019-03-01 NOTE — PROGRESS NOTES
"Subjective:   Patient ID: Liliana Wright is a 43 y.o. female  Chief complaint:   Chief Complaint   Patient presents with    Annual Exam       HPI  Pt here for annual exam     AHSAN: +Stress at work - not interested in medication   - improved overall - no more panic attacks   - figured out triggers and how to avoid   - yoga has helped as well    Morphea: followed by derm now every 2 years as sx stable   Hx of elevated esr and crp in past - currently, no joint pain or swelling, stiffness, fevers or chills, sore throat.   - no joint pain      Vit d def: currently taking vit d Rx twice weekly and overdue for f/u lab  - taking vit d supplement daily    Vegan - taking b12 supplement and taking biotin      Declined flu vaccine and tdap vaccines - reports did not get in December     Review of Systems    Objective:  Vitals:    03/01/19 0815   BP: 120/82   BP Location: Left arm   Patient Position: Sitting   Pulse: 74   SpO2: 99%   Weight: 74 kg (163 lb 2.3 oz)   Height: 5' 1" (1.549 m)     Body mass index is 30.83 kg/m².    Physical Exam   Constitutional: She is oriented to person, place, and time. She appears well-developed and well-nourished.   HENT:   Head: Normocephalic and atraumatic.   Right Ear: External ear normal.   Left Ear: External ear normal.   Nose: Nose normal.   Mouth/Throat: Oropharynx is clear and moist. No oropharyngeal exudate.   No carotid bruits   Eyes: Conjunctivae and EOM are normal.   Neck: Neck supple. No thyromegaly present.   Cardiovascular: Normal rate, regular rhythm, normal heart sounds and intact distal pulses.   Pulmonary/Chest: Effort normal and breath sounds normal.   Abdominal: Soft. Bowel sounds are normal.   Musculoskeletal: She exhibits no edema or tenderness.   Lymphadenopathy:     She has no cervical adenopathy.   Neurological: She is alert and oriented to person, place, and time.   Skin: Skin is warm and dry.   Psychiatric: Her behavior is normal. Thought content normal.   Vitals " reviewed.      Assessment:  1. Annual physical exam    2. Morphea    3. Anxiety    4. Elevated C-reactive protein (CRP)    5. Vitamin D deficiency    6. Elevated sed rate        Plan:  Liliana was seen today for annual exam.    Diagnoses and all orders for this visit:    Annual physical exam  -     Vitamin D; Future  -     TSH; Future  -     Lipid panel; Future  -     CBC auto differential; Future  -     Comprehensive metabolic panel; Future  -     Sedimentation rate; Future  -     C-reactive protein; Future  -     Ferritin; Future  -     Iron and TIBC; Future    Morphea  -     Sedimentation rate; Future  -     C-reactive protein; Future  Followed by derm and stable per pt     Anxiety  Stable - cont exercise     Elevated C-reactive protein (CRP)  -     Sedimentation rate; Future  -     C-reactive protein; Future    Vitamin D deficiency  -     Vitamin D; Future  Cont suppl  Check level   Elevated sed rate    Health Maintenance   Topic Date Due    Mammogram  04/08/2019 (Originally 2/8/2019)    Pap Smear with HPV Cotest  06/05/2021    TETANUS VACCINE  03/01/2029    Lipid Panel  Completed    Influenza Vaccine  Addressed

## 2019-03-06 ENCOUNTER — PATIENT MESSAGE (OUTPATIENT)
Dept: DERMATOLOGY | Facility: CLINIC | Age: 44
End: 2019-03-06

## 2019-03-11 ENCOUNTER — OFFICE VISIT (OUTPATIENT)
Dept: ORTHOPEDICS | Facility: CLINIC | Age: 44
End: 2019-03-11
Payer: COMMERCIAL

## 2019-03-11 ENCOUNTER — HOSPITAL ENCOUNTER (OUTPATIENT)
Dept: RADIOLOGY | Facility: HOSPITAL | Age: 44
Discharge: HOME | End: 2019-03-11
Attending: ORTHOPAEDIC SURGERY
Payer: COMMERCIAL

## 2019-03-11 VITALS
HEART RATE: 81 BPM | WEIGHT: 163 LBS | BODY MASS INDEX: 30.78 KG/M2 | DIASTOLIC BLOOD PRESSURE: 78 MMHG | HEIGHT: 61 IN | SYSTOLIC BLOOD PRESSURE: 117 MMHG

## 2019-03-11 DIAGNOSIS — M67.472 GANGLION CYST OF LEFT FOOT: ICD-10-CM

## 2019-03-11 DIAGNOSIS — M79.675 GREAT TOE PAIN, LEFT: ICD-10-CM

## 2019-03-11 DIAGNOSIS — M79.675 GREAT TOE PAIN, LEFT: Primary | ICD-10-CM

## 2019-03-11 PROCEDURE — 99213 PR OFFICE/OUTPT VISIT, EST, LEVL III, 20-29 MIN: ICD-10-PCS | Mod: S$GLB,,, | Performed by: ORTHOPAEDIC SURGERY

## 2019-03-11 PROCEDURE — 73630 X-RAY EXAM OF FOOT: CPT | Mod: 26,LT,, | Performed by: INTERNAL MEDICINE

## 2019-03-11 PROCEDURE — 73630 X-RAY EXAM OF FOOT: CPT | Mod: TC,LT

## 2019-03-11 PROCEDURE — 73630 XR FOOT COMPLETE 3 VIEW LEFT: ICD-10-PCS | Mod: 26,LT,, | Performed by: INTERNAL MEDICINE

## 2019-03-11 PROCEDURE — 99999 PR PBB SHADOW E&M-EST. PATIENT-LVL III: CPT | Mod: PBBFAC,,, | Performed by: ORTHOPAEDIC SURGERY

## 2019-03-11 PROCEDURE — 99213 OFFICE O/P EST LOW 20 MIN: CPT | Mod: S$GLB,,, | Performed by: ORTHOPAEDIC SURGERY

## 2019-03-11 PROCEDURE — 99999 PR PBB SHADOW E&M-EST. PATIENT-LVL III: ICD-10-PCS | Mod: PBBFAC,,, | Performed by: ORTHOPAEDIC SURGERY

## 2019-03-11 RX ORDER — FERROUS SULFATE 15 MG/ML
15 DROPS ORAL DAILY
COMMUNITY
End: 2019-08-13

## 2019-03-11 RX ORDER — CHOLECALCIFEROL (VITAMIN D3) 125 MCG
CAPSULE ORAL DAILY
COMMUNITY

## 2019-03-11 NOTE — LETTER
March 12, 2019      Arianna Stoddard MD  [address]  [facility] [address]           [facility]  [address], [address]  [facility] [address]  Phone: [phone]          Patient: Liliana Wright   MR Number: [id number]   YOB: 1975   Date of Visit: 3/11/2019       Dear Dr. Arianna Stoddard:    Thank you for referring Liliana Wright to me for evaluation. Attached you will find relevant portions of my assessment and plan of care.    If you have questions, please do not hesitate to call me. I look forward to following Liliana Wright along with you.    Sincerely,    Stan Delatorre MD    Enclosure  CC:  No Recipients    If you would like to receive this communication electronically, please contact [email or web address] or [phone] to request more information on [employer] Link access.    For providers and/or their staff who would like to refer a patient to [facility], please contact us through our one-stop-shop provider referral line, [facility], at [phone].    If you feel you have received this communication in error or would no longer like to receive these types of communications, please e-mail [email or web address]

## 2019-03-11 NOTE — PROGRESS NOTES
DATE: 3/11/2019  PATIENT: Liliana Wright    CHIEF COMPLAINT: left foot cyst    HISTORY:  Liliana Wright is a 43 y.o. female ( at [employer]) here for initial evaluation of a left foot cyst. The cyst has been present for about 3 months. It was first noticed by her . It is located at the base of the 1st MTP on the lateral side. Per the patient, it has not changed in size or character. She states that is not painful, but is slightly bothersome when she wears tight shoes.  The pain is improved by removal of shoewear. There is no associated numbness and tingling.  No treatment thus far. She denies constitutional symptoms such as fevers/chills or weight loss.      PAST MEDICAL/SURGICAL HISTORY:  Past Medical History:   Diagnosis Date    Abnormal Pap smear     GEGE I on bx     Cervical high risk HPV (human papillomavirus) test positive     PCO (polycystic ovaries) 3/13/2014    Took Metformin during first two pregnancies, stopped before conception this pregnancy and does not want to take     Previous  section 3/13/2014    X 2, induction and FTP, 2nd preg PROM at 37wk with repeat     Rh negative status during pregnancy 3/14/2014    Rhogam at 28wk     Vegetarian 2017     Past Surgical History:   Procedure Laterality Date     SECTION      X 2    CHOLECYSTECTOMY      D & C (SUCTION), D&E 2nd trimester loss, N/A 10/31/2014    Performed by [facility] at [facility] OR    DILATION AND CURETTAGE OF UTERUS  2014    missed AB     DILATION AND CURETTAGE, UTERUS N/A 2014    Per[facility] by [facility] at [facility] OR    OVARIAN CYST REMOVAL      with c/s        Current Medications:   Current Outpatient Medications:     biotin 5,000 mcg TbDL, Take by mouth once daily., Disp: , Rfl:     ergocalciferol (ERGOCALCIFEROL) 50,000 unit Cap, TAKE 1 CAPSULE BY MOUTH EVERY MONDAY AND THURSDAY, Disp: 24 capsule, Rfl: 0    ferrous sulfate (IRON) 15 mg iron (75 mg)/mL  "Drop, 15 mg once daily., Disp: , Rfl:     prednisoLONE acetate (PRED FORTE) 1 % DrpS, Place 1 drop into the right eye 3 (three) times daily., Disp: 5 mL, Rfl: 0    Social History:   Social History     Socioeconomic History    Marital status:      Spouse name: Not on file    Number of children: Not on file    Years of education: Not on file    Highest education level: Not on file   Social Needs    Financial resource strain: Not on file    Food insecurity - worry: Not on file    Food insecurity - inability: Not on file    Transportation needs - medical: Not on file    Transportation needs - non-medical: Not on file   Occupational History     Employer: [employer]   Tobacco Use    Smoking status: Never Smoker    Smokeless tobacco: Never Used   Substance and Sexual Activity    Alcohol use: No    Drug use: No    Sexual activity: Yes     Partners: Male     Birth control/protection: None     Comment:  to [employer]    Other Topics Concern    Are you pregnant or think you may be? No    Breast-feeding No   Social History Narrative    From [place]    Moved to [facility] in 1991       REVIEW OF SYSTEMS:  Constitution: Negative. Negative for chills, fever and night sweats.   Cardiovascular: Negative for chest pain and syncope.   Respiratory: Negative for cough and shortness of breath.   Gastrointestinal: See HPI. Negative for nausea/vomiting. Negative for abdominal pain.  Genitourinary: See HPI. Negative for discoloration or dysuria.  Skin: Negative for dry skin, itching and rash.   Hematologic/Lymphatic: Negative for bleeding problem. Does not bruise/bleed easily.   Musculoskeletal: Negative for falls and muscle weakness.   Neurological: See HPI. No seizures.   Endocrine: Negative for polydipsia, polyphagia and polyuria.   Allergic/Immunologic: Negative for hives and persistent infections.    PHYSICAL EXAMINATION:    /78   Pulse 81   Ht 5' 1" (1.549 m)   Wt 73.9 kg (163 lb 0.5 oz)   LMP " 02/13/2019 (Exact Date)   BMI 30.80 kg/m²     General: The patient is a pleasant 43 y.o. female in no apparent distress, the patient is orientatied to person, place and time.   Psych: Normal mood and affect  HEENT:  NCAT  Lungs:  Respirations are equal and unlabored.  CV:  2+ bilateral upper and lower extremity pulses.  Skin:  Intact throughout.    MSK:  Left foot: skin intact. Firm, mobile cystic structure present at base of great toe proximal phalanx adjacent to MTPJ.. Non-tender to palpation. Motor and sensation intact distally. Palpable distal pulses.        IMAGING:     Radiographs of the left foot were ordered and personally reviewed with the patient today.  They show no bony abnormalities. Very mild hallux rigidus.    ASSESSMENT/PLAN:    1. Great toe pain, left  X-Ray Foot Complete Left   2. Ganglion cyst of left foot         The patient presents with a non-tender, non-painful cyst of the of the left foot in the 1st webspace.  It is more than likely a ganglion cyst.  Options include observation vs. excision.  The patient elects to proceed with observation.  Recommend avoiding narrow toe-box shoes which seem to exacerbate her symptoms.  If cyst enlarges in size/character or becomes painful, we will re-consider.  Follow-up PRN.      I have personally taken the history and examined this patient and agree with the residents note as stated above.

## 2019-03-28 ENCOUNTER — PATIENT MESSAGE (OUTPATIENT)
Dept: DERMATOLOGY | Facility: CLINIC | Age: 44
End: 2019-03-28

## 2019-05-15 ENCOUNTER — PATIENT MESSAGE (OUTPATIENT)
Dept: OBSTETRICS AND GYNECOLOGY | Facility: CLINIC | Age: 44
End: 2019-05-15

## 2019-06-30 ENCOUNTER — PATIENT MESSAGE (OUTPATIENT)
Dept: OBSTETRICS AND GYNECOLOGY | Facility: CLINIC | Age: 44
End: 2019-06-30

## 2019-07-17 ENCOUNTER — PATIENT MESSAGE (OUTPATIENT)
Dept: INTERNAL MEDICINE | Facility: CLINIC | Age: 44
End: 2019-07-17
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
## 2019-07-18 ENCOUNTER — PATIENT MESSAGE (OUTPATIENT)
Dept: OBSTETRICS AND GYNECOLOGY | Facility: CLINIC | Age: 44
End: 2019-07-18

## 2019-08-13 ENCOUNTER — OFFICE VISIT (OUTPATIENT)
Dept: OBSTETRICS AND GYNECOLOGY | Facility: CLINIC | Age: 44
End: 2019-08-13
Payer: COMMERCIAL

## 2019-08-13 VITALS
BODY MASS INDEX: 33.63 KG/M2 | DIASTOLIC BLOOD PRESSURE: 74 MMHG | SYSTOLIC BLOOD PRESSURE: 124 MMHG | WEIGHT: 171.31 LBS | HEIGHT: 60 IN

## 2019-08-13 DIAGNOSIS — Z12.31 VISIT FOR SCREENING MAMMOGRAM: ICD-10-CM

## 2019-08-13 DIAGNOSIS — Z01.419 ENCOUNTER FOR GYNECOLOGICAL EXAMINATION WITHOUT ABNORMAL FINDING: Primary | ICD-10-CM

## 2019-08-13 PROCEDURE — 99999 PR PBB SHADOW E&M-EST. PATIENT-LVL III: CPT | Mod: PBBFAC,,, | Performed by: OBSTETRICS & GYNECOLOGY

## 2019-08-13 PROCEDURE — 99396 PR PREVENTIVE VISIT,EST,40-64: ICD-10-PCS | Mod: S$GLB,,, | Performed by: OBSTETRICS & GYNECOLOGY

## 2019-08-13 PROCEDURE — 99999 PR PBB SHADOW E&M-EST. PATIENT-LVL III: ICD-10-PCS | Mod: PBBFAC,,, | Performed by: OBSTETRICS & GYNECOLOGY

## 2019-08-13 PROCEDURE — 99396 PREV VISIT EST AGE 40-64: CPT | Mod: S$GLB,,, | Performed by: OBSTETRICS & GYNECOLOGY

## 2019-08-13 NOTE — PROGRESS NOTES
Well woman exam    Liliana Wright is a 44 y.o. female  presents for well woman exam.  LMP: Patient's last menstrual period was 2019 (exact date).  Patient reports 2 month history of intermittent, mild discomfort in left lower quadrant.  Patient reports it would be around time of ovulation and before menses.  Pain described as mild with no medications being taken.  Regular, cyclic menses reported.  No other gynecologic issues, problems, or complaints.      Past Medical History:   Diagnosis Date    Abnormal Pap smear     GEGE I on bx     Cervical high risk HPV (human papillomavirus) test positive     PCO (polycystic ovaries) 3/13/2014    Took Metformin during first two pregnancies, stopped before conception this pregnancy and does not want to take     Previous  section 3/13/2014    X 2, induction and FTP, 2nd preg PROM at 37wk with repeat     Rh negative status during pregnancy 3/14/2014    Rhogam at 28wk     Vegetarian 2017     Past Surgical History:   Procedure Laterality Date     SECTION      X 2    CHOLECYSTECTOMY      D & C (SUCTION), D&E 2nd trimester loss, N/A 10/31/2014    [facility]    DILATION AND CURETTAGE OF UTERUS  2014    missed AB     DILATION AND CURETTAGE, UTERUS N/A 2014    Per[facility] at [facility] OR    OVARIAN CYST REMOVAL      with c/s      Social History     Socioeconomic History    Marital status:      Spouse name: Not on file    Number of children: Not on file    Years of education: Not on file    Highest education level: Not on file   Occupational History     Employer: [employer]   Social Needs    Financial resource strain: Not on file    Food insecurity:     Worry: Not on file     Inability: Not on file    Transportation needs:     Medical: Not on file     Non-medical: Not on file   Tobacco Use    Smoking status: Never Smoker    Smokeless tobacco: Never Used    Substance and Sexual Activity    Alcohol use: No    Drug use: No    Sexual activity: Yes     Partners: Male     Birth control/protection: None     Comment:  to [employer]    Lifestyle    Physical activity:     Days per week: Not on file     Minutes per session: Not on file    Stress: Not on file   Relationships    Social connections:     Talks on phone: Not on file     Gets together: Not on file     Attends Zoroastrian service: Not on file     Active member of club or organization: Not on file     Attends meetings of clubs or organizations: Not on file     Relationship status: Not on file   Other Topics Concern    Are you pregnant or think you may be? No    Breast-feeding No   Social History Narrative    From [place]    Moved to [facility] in      Family History   Problem Relation Age of Onset    Lupus Father     No Known Problems Mother     Breast cancer Maternal Grandmother 70    No Known Problems Sister     No Known Problems Brother     No Known Problems Daughter     No Known Problems Son     Alopecia Neg Hx     Diabetes Neg Hx     Hypertension Neg Hx     Heart disease Neg Hx     Stroke Neg Hx     Acne Neg Hx     Eczema Neg Hx     Psoriasis Neg Hx     Melanoma Neg Hx     Ovarian cancer Neg Hx     Colon cancer Neg Hx      OB History        4    Para   3    Term   3            AB   1    Living   2       SAB   1    TAB        Ectopic        Multiple        Live Births   2                 /74   Ht 5' (1.524 m)   Wt 77.7 kg (171 lb 4.8 oz)   LMP 2019 (Exact Date)   BMI 33.45 kg/m²       ROS:  GENERAL: Denies weight gain or weight loss. Feeling well overall.   SKIN: Denies rash or lesions.   HEAD: Denies head injury or headache.   NODES: Denies enlarged lymph nodes.   CHEST: Denies chest pain or shortness of breath.   CARDIOVASCULAR: Denies palpitations or left sided chest pain.   ABDOMEN: No abdominal pain, constipation, diarrhea, nausea, vomiting or rectal  bleeding.   URINARY: No frequency, dysuria, hematuria, or burning on urination.  REPRODUCTIVE: See HPI.   BREASTS: The patient performs breast self-examination and denies pain, lumps, or nipple discharge.   HEMATOLOGIC: No easy bruisability or excessive bleeding.   MUSCULOSKELETAL: Denies joint pain or swelling.   NEUROLOGIC: Denies syncope or weakness.   PSYCHIATRIC: Denies depression, anxiety or mood swings.    PHYSICAL EXAM:  APPEARANCE: Well nourished, well developed, in no acute distress.  AFFECT: WNL, alert and oriented x 3  SKIN: No acne or hirsutism  NECK: Neck symmetric without masses or thyromegaly  NODES: No inguinal, cervical, axillary, or femoral lymph node enlargement  CHEST: Good respiratory effect  ABDOMEN: Soft.  No tenderness or masses.  No hepatosplenomegaly.  No hernias.  Pfannenstiel skin incision healed  BREASTS: Symmetrical, no skin changes or visible lesions.  No palpable masses, nipple discharge bilaterally.  PELVIC: Normal external genitalia without lesions.  Normal hair distribution.  Adequate perineal body, normal urethral meatus.  Vagina moist and well rugated without lesions or discharge.  Cervix pink, without lesions, discharge or tenderness.  No significant cystocele or rectocele.  Bimanual exam shows uterus to be normal size, regular, mobile and nontender.  Adnexa without masses or tenderness.  No left ovarian enlargement noted on bimanual exam.  EXTREMITIES: No edema.    Encounter for gynecological examination without abnormal finding    Visit for screening mammogram  -     Mammo Digital Screening Bilat w/ Alexi; Future; Expected date: 08/13/2019      Benign bimanual exam.    Pap smear not done/not indicated     Patient instructed to monitor for discomfort.  If intermittent left lower quadrant discomfort continues, will recommend pelvic sonogram.  Patient verbalized understanding will contact office for continued discomfort.    Patient instructed to keep a menstrual  calendar.    Patient was counseled today on A.C.S. Pap guidelines and recommendations for yearly pelvic exams, mammograms and monthly self breast exams; to see her PCP for other health maintenance.     Follow up in about 1 year (around 8/13/2020) for Annual exam.    Ronaldo Jade IV, MD

## 2019-08-26 ENCOUNTER — PATIENT MESSAGE (OUTPATIENT)
Dept: INTERNAL MEDICINE | Facility: CLINIC | Age: 44
End: 2019-08-26

## 2019-08-26 NOTE — TELEPHONE ENCOUNTER
Ms Adriana we have changed your appt to 9/13/19 at 220 pm. Let us know if this does not work for you

## 2019-08-27 ENCOUNTER — TELEPHONE (OUTPATIENT)
Dept: OPHTHALMOLOGY | Facility: CLINIC | Age: 44
End: 2019-08-27

## 2019-08-27 NOTE — TELEPHONE ENCOUNTER
Spoke with pt to triage symptoms. Patients c/o red eye. No pain, discharge or crusting. Pt admit to having SAHRA and not using ATs. I advised the pt to use ATs 4-6x qd for 2 days and if no better to come in

## 2019-09-13 ENCOUNTER — OFFICE VISIT (OUTPATIENT)
Dept: INTERNAL MEDICINE | Facility: CLINIC | Age: 44
End: 2019-09-13
Attending: INTERNAL MEDICINE
Payer: COMMERCIAL

## 2019-09-13 VITALS
BODY MASS INDEX: 31.3 KG/M2 | HEIGHT: 61 IN | DIASTOLIC BLOOD PRESSURE: 85 MMHG | HEART RATE: 96 BPM | WEIGHT: 165.81 LBS | SYSTOLIC BLOOD PRESSURE: 128 MMHG | OXYGEN SATURATION: 98 %

## 2019-09-13 DIAGNOSIS — M94.0 COSTOCHONDRITIS: Primary | ICD-10-CM

## 2019-09-13 DIAGNOSIS — F41.9 ANXIETY: ICD-10-CM

## 2019-09-13 DIAGNOSIS — R07.9 CHEST PAIN, UNSPECIFIED TYPE: ICD-10-CM

## 2019-09-13 PROCEDURE — 99214 OFFICE O/P EST MOD 30 MIN: CPT | Mod: S$GLB,,, | Performed by: INTERNAL MEDICINE

## 2019-09-13 PROCEDURE — 99214 PR OFFICE/OUTPT VISIT, EST, LEVL IV, 30-39 MIN: ICD-10-PCS | Mod: S$GLB,,, | Performed by: INTERNAL MEDICINE

## 2019-09-13 PROCEDURE — 99999 PR PBB SHADOW E&M-EST. PATIENT-LVL III: CPT | Mod: PBBFAC,,, | Performed by: INTERNAL MEDICINE

## 2019-09-13 PROCEDURE — 99999 PR PBB SHADOW E&M-EST. PATIENT-LVL III: ICD-10-PCS | Mod: PBBFAC,,, | Performed by: INTERNAL MEDICINE

## 2019-09-13 NOTE — PROGRESS NOTES
"Subjective:   Patient ID: Liliana Wright is a 44 y.o. female  Chief complaint:   Chief Complaint   Patient presents with    Chest Pain       HPI  Pt here for UC appt     Reports in 2013 had episodes of chest pain   + ttp of chest wall at that time.   Symptoms described as pressure in nature and pain   This woke her from sleep at night at that time  Seen by cards in 2013 and all testing - EKG and exercise stress echo - neg  - dx with costochondritis in chest wall   - took nsaids and this resolved     Sx returned in August and lasted for about 2-3 weeks   Rde that time she had same symptoms described above with chest wall pain   + ttp of chest wall - reproducible   - felt pain with taking deep breaths and with moving arms or lifting   - no cough or wheezing, no fevers or URI or GI symptoms   - was using rowing machine during this time and thinks sx worsened or triggered symptoms and then stopped rowing    - took advil and symptoms resolved about 1 week ago  - no cp, sob, wheezing, nausea, vomiting, diaphoresis, cp with exertion, reflux     - reports had what she thinks was a panic attack and called 911 and when they arrived - her bg was normal, bp was normal,   - this occurred a few weeks ago   - not exercising regularly   - reports inc anxieyt and stress with work, home responsibilities, school   - no pain or sob or diaphoresis with walkign or going up flight of stairs   - feeling overwhelmed and tearful at times   - no depression   Father with hx of anxiety - he responded to zoloft  No reg exercise at this time due to time constraints   - does not want to start controller med   - does not have time for counseling and will think about this     Review of Systems    Objective:  Vitals:    09/13/19 1443   BP: 128/85   Pulse: 96   SpO2: 98%   Weight: 75.2 kg (165 lb 12.6 oz)   Height: 5' 1" (1.549 m)     Body mass index is 31.32 kg/m².    Physical Exam   Constitutional: She is oriented to person, place, and time. " She appears well-developed and well-nourished.   HENT:   Head: Normocephalic and atraumatic.   Right Ear: External ear normal.   Left Ear: External ear normal.   Nose: Nose normal.   Mouth/Throat: Oropharynx is clear and moist.   Eyes: Conjunctivae and EOM are normal.   Neck: Normal range of motion. Neck supple.   Cardiovascular: Normal rate, regular rhythm and intact distal pulses.   Pulmonary/Chest: Effort normal and breath sounds normal. No stridor. No respiratory distress. She has no wheezes. She has no rales. She exhibits no tenderness.   Abdominal: Soft. Normal appearance and bowel sounds are normal.   Musculoskeletal: She exhibits no edema or tenderness.   Neurological: She is alert and oriented to person, place, and time. She has normal strength. Gait normal.   Skin: Skin is warm, dry and intact. No cyanosis. Nails show no clubbing.   Psychiatric: She has a normal mood and affect. Her speech is normal and behavior is normal. Cognition and memory are normal.   Vitals reviewed.    Assessment:  1. Costochondritis    2. Chest pain, unspecified type    3. Anxiety        Plan:  Liliana was seen today for chest pain.    Diagnoses and all orders for this visit:    Costochondritis  Resolved   C/w prior episode     Chest pain, unspecified type  -     SCHEDULED EKG 12-LEAD (to Muse); Future    Anxiety  Declines trial of zoloft at this time   Will let me know if reconsiders trial of zoloft and hydroxyzine prn and counseling       Health Maintenance   Topic Date Due    Mammogram  03/01/2020    Pap Smear with HPV Cotest  06/05/2021    TETANUS VACCINE  03/01/2029    Lipid Panel  Completed

## 2019-09-15 ENCOUNTER — NURSE TRIAGE (OUTPATIENT)
Dept: ADMINISTRATIVE | Facility: CLINIC | Age: 44
End: 2019-09-15

## 2019-09-15 NOTE — TELEPHONE ENCOUNTER
Reason for Disposition   [1] Purple or blood-colored LOCALIZED rash AND [2] from injury or friction    Additional Information   Negative: [1] Purple or blood-colored WIDESPREAD rash AND [2] fever   Negative: [1] Purple or blood-colored WIDESPREAD rash AND [2] very weak   Negative: Shock suspected (e.g., cold/pale/clammy skin, too weak to stand, low BP, rapid pulse)   Negative: Difficult to awaken or acting confused (e.g., disoriented, slurred speech)   Negative: Sounds like a life-threatening emergency to the triager   Negative: Headache   Negative: [1] Purple or blood-colored LOCALIZED rash AND [2] fever   Negative: Patient sounds very sick or weak to the triager   Negative: [1] Purple or blood-colored WIDESPREAD rash AND [2] no fever AND [3] sounds well to triager   Negative: [1] Purple or blood-colored LOCALIZED rash AND [2] no fever AND [3] sounds well to triager  (Exception: bruise from injury or friction)   Negative: [1] From injury BUT [2] suspicious history for the injury   Negative: Taking Coumadin (warfarin) or other strong blood thinner, or known bleeding disorder (e.g., thrombocytopenia)    Protocols used: RASH - PURPLE SPOTS OR DOTS-A-    Pt stated she has small red dots over the bridge of her nose where her classes are. Care advice provided in protocol.

## 2019-10-28 ENCOUNTER — PATIENT MESSAGE (OUTPATIENT)
Dept: INTERNAL MEDICINE | Facility: CLINIC | Age: 44
End: 2019-10-28

## 2019-10-29 RX ORDER — SERTRALINE HYDROCHLORIDE 50 MG/1
TABLET, FILM COATED ORAL
Qty: 30 TABLET | Refills: 1 | Status: SHIPPED | OUTPATIENT
Start: 2019-10-29 | End: 2020-09-23

## 2019-10-29 NOTE — TELEPHONE ENCOUNTER
zoloft ordered   msg sent to pt informing her of this   Please arrange appt with me in 4-6 weeks for med check

## 2019-11-15 ENCOUNTER — HOSPITAL ENCOUNTER (OUTPATIENT)
Dept: CARDIOLOGY | Facility: HOSPITAL | Age: 44
Discharge: HOME | End: 2019-11-15
Attending: INTERNAL MEDICINE
Payer: COMMERCIAL

## 2019-11-15 DIAGNOSIS — R07.9 CHEST PAIN, UNSPECIFIED TYPE: ICD-10-CM

## 2019-11-15 PROCEDURE — 93010 ELECTROCARDIOGRAM REPORT: CPT | Mod: ,,, | Performed by: INTERNAL MEDICINE

## 2019-11-15 PROCEDURE — 93010 EKG 12-LEAD: ICD-10-PCS | Mod: ,,, | Performed by: INTERNAL MEDICINE

## 2019-11-15 PROCEDURE — 93005 ELECTROCARDIOGRAM TRACING: CPT

## 2019-11-25 ENCOUNTER — PATIENT MESSAGE (OUTPATIENT)
Dept: DERMATOLOGY | Facility: CLINIC | Age: 44
End: 2019-11-25

## 2019-11-25 ENCOUNTER — TELEPHONE (OUTPATIENT)
Dept: DERMATOLOGY | Facility: CLINIC | Age: 44
End: 2019-11-25

## 2019-11-25 NOTE — TELEPHONE ENCOUNTER
----- Message from Lashell Garces sent at 11/25/2019 11:47 AM CST -----  Contact: BRETT HOOPER [[id number]]  Name of Who is Calling : BRETT HOOPER [[id number]]    Patient is requesting a call from staff in regards to inquires about the prices and treatments  provided prior to getting scheduled  .....Please contact to further discuss and advise.    Can the clinic reply by [facility] : No    What Number to Call Back :  [phone]

## 2019-11-25 NOTE — TELEPHONE ENCOUNTER
Called PT and explained how laser clinic works. Told PT the we do not like to give prices ahead of time because every person is different and will need different amounts of pulses.

## 2019-12-09 ENCOUNTER — TELEPHONE (OUTPATIENT)
Dept: DERMATOLOGY | Facility: CLINIC | Age: 44
End: 2019-12-09

## 2019-12-09 NOTE — TELEPHONE ENCOUNTER
----- Message from Claire Mayo sent at 12/9/2019 10:58 AM CST -----  Contact: BRETT HOOPER  Name of Who is Calling: BRETT HOOPER      What is the request in detail: Would like to speak to staff in regards to rescheduling laser appointment, but would like the appointment to be sometime in February on the 21st and preferably in the morning. Please advise.       Can the clinic reply by [facility]: Yes      What Number to Call Back if not in [facility]: [phone]

## 2020-03-03 ENCOUNTER — HOSPITAL ENCOUNTER (OUTPATIENT)
Dept: RADIOLOGY | Facility: HOSPITAL | Age: 45
Discharge: HOME | End: 2020-03-03
Attending: OBSTETRICS & GYNECOLOGY
Payer: COMMERCIAL

## 2020-03-03 VITALS — WEIGHT: 165 LBS | BODY MASS INDEX: 31.18 KG/M2

## 2020-03-03 DIAGNOSIS — Z12.31 VISIT FOR SCREENING MAMMOGRAM: ICD-10-CM

## 2020-03-03 PROCEDURE — 77063 BREAST TOMOSYNTHESIS BI: CPT | Mod: 26,,, | Performed by: RADIOLOGY

## 2020-03-03 PROCEDURE — 77067 SCR MAMMO BI INCL CAD: CPT | Mod: TC

## 2020-03-03 PROCEDURE — 77067 MAMMO DIGITAL SCREENING BILAT WITH TOMOSYNTHESIS_CAD: ICD-10-PCS | Mod: 26,,, | Performed by: RADIOLOGY

## 2020-03-03 PROCEDURE — 77063 MAMMO DIGITAL SCREENING BILAT WITH TOMOSYNTHESIS_CAD: ICD-10-PCS | Mod: 26,,, | Performed by: RADIOLOGY

## 2020-03-03 PROCEDURE — 77067 SCR MAMMO BI INCL CAD: CPT | Mod: 26,,, | Performed by: RADIOLOGY

## 2020-03-09 ENCOUNTER — PATIENT MESSAGE (OUTPATIENT)
Dept: OBSTETRICS AND GYNECOLOGY | Facility: CLINIC | Age: 45
End: 2020-03-09

## 2020-03-10 ENCOUNTER — PATIENT MESSAGE (OUTPATIENT)
Dept: INTERNAL MEDICINE | Facility: CLINIC | Age: 45
End: 2020-03-10

## 2020-03-10 NOTE — TELEPHONE ENCOUNTER
It can but recommend that pt be evaluated in clinic for symptoms - please arrange appt or go to UC near her if unable to come to clinic during office hours

## 2020-03-10 NOTE — TELEPHONE ENCOUNTER
Spoke to MsLis Adriana and informed her that It can cause pain,but recommend that pt be evaluated in clinic for symptoms - Patient states that she already has an appt with Dr. Scott and will wait before she go to UC.  Instructed patient if symptoms worsens she needs to go to the UC.  Patient states understanding.

## 2020-03-12 ENCOUNTER — TELEPHONE (OUTPATIENT)
Dept: DERMATOLOGY | Facility: CLINIC | Age: 45
End: 2020-03-12

## 2020-03-13 ENCOUNTER — PROCEDURE VISIT (OUTPATIENT)
Dept: DERMATOLOGY | Facility: CLINIC | Age: 45
End: 2020-03-13

## 2020-03-13 ENCOUNTER — TELEPHONE (OUTPATIENT)
Dept: DERMATOLOGY | Facility: CLINIC | Age: 45
End: 2020-03-13

## 2020-03-13 DIAGNOSIS — L81.4 LENTIGINES: ICD-10-CM

## 2020-03-13 DIAGNOSIS — Z41.1 ELECTIVE PROCEDURE FOR UNACCEPTABLE COSMETIC APPEARANCE: Primary | ICD-10-CM

## 2020-03-13 PROCEDURE — 99241 PR OFFICE CONSULT, COSMETIC: ICD-10-PCS | Mod: CSM,,, | Performed by: DERMATOLOGY

## 2020-03-13 PROCEDURE — 99241 PR OFFICE CONSULT, COSMETIC: CPT | Mod: CSM,,, | Performed by: DERMATOLOGY

## 2020-03-13 PROCEDURE — 99499 UNLISTED E&M SERVICE: CPT | Mod: CSM,,, | Performed by: DERMATOLOGY

## 2020-03-13 PROCEDURE — 99499 NO LOS: ICD-10-PCS | Mod: CSM,,, | Performed by: DERMATOLOGY

## 2020-03-13 RX ORDER — HYDROQUINONE 40 MG/G
CREAM TOPICAL
Qty: 30 G | Refills: 1 | Status: SHIPPED | OUTPATIENT
Start: 2020-03-13 | End: 2020-09-23

## 2020-03-13 NOTE — PROGRESS NOTES
CC: sun spots on cheeks  HPI: Pt complains of sun spots on both cheeks that have slowly enlarged and has gotten more over 10+ years. She has very sensitive, rosacea-prone skin. She wears a physical SPF everyday.  PE: several tan macules on bilateral cheeks  A: Lentigines  P: Prescribed hydroquinone 4% cream. Apply sparingly to affected areas of face BID prn dark spots. Do not use for longer than 16 weeks.  Prescribed azelaic acid 15% / niacinamide 2% cream. Apply to affected areas of face BID.  Start each cream 1x/day and increase to 2x/day as tolerated.  Patient instructed on importance of daily sun protection with a broad-spectrum sunscreen of SPF 30 or higher. Sun avoidance and topical protection discussed.   Patient encouraged to wear hat for all outdoor exposure.   Will perform test spot today.

## 2020-03-13 NOTE — TELEPHONE ENCOUNTER
Spoke to pt, explained that we neglected to collect her $75. Consultation fee. She would be getting a bill in the mail. She stated she understood and no problem

## 2020-03-31 ENCOUNTER — PATIENT MESSAGE (OUTPATIENT)
Dept: DERMATOLOGY | Facility: CLINIC | Age: 45
End: 2020-03-31

## 2020-04-05 ENCOUNTER — PATIENT MESSAGE (OUTPATIENT)
Dept: INTERNAL MEDICINE | Facility: CLINIC | Age: 45
End: 2020-04-05

## 2020-04-05 ENCOUNTER — PATIENT MESSAGE (OUTPATIENT)
Dept: DERMATOLOGY | Facility: CLINIC | Age: 45
End: 2020-04-05

## 2020-04-05 ENCOUNTER — PATIENT MESSAGE (OUTPATIENT)
Dept: OBSTETRICS AND GYNECOLOGY | Facility: CLINIC | Age: 45
End: 2020-04-05

## 2020-04-20 ENCOUNTER — PATIENT MESSAGE (OUTPATIENT)
Dept: OBSTETRICS AND GYNECOLOGY | Facility: CLINIC | Age: 45
End: 2020-04-20

## 2020-04-21 ENCOUNTER — TELEPHONE (OUTPATIENT)
Dept: OBSTETRICS AND GYNECOLOGY | Facility: CLINIC | Age: 45
End: 2020-04-21

## 2020-04-21 DIAGNOSIS — N64.4 BREAST PAIN IN FEMALE: Primary | ICD-10-CM

## 2020-04-21 NOTE — TELEPHONE ENCOUNTER
Right breast pain with color changes right breast. Per Dr. Jade schedule with breast center provider for evaluation. REF in system. Patient can call [phone] to schedule.     Lm on vm to cb

## 2020-04-21 NOTE — TELEPHONE ENCOUNTER
Spoke to pt and informed her per Dr. Jade she could make an appt with breast center provider [phone])for evaluation or self monitor. Pt stated her symptoms had improved by 70% today. Asked pt to keep us updated with any additional changes. Pt verbalized understanding

## 2020-05-14 ENCOUNTER — TELEPHONE (OUTPATIENT)
Dept: SURGERY | Facility: CLINIC | Age: 45
End: 2020-05-14

## 2020-05-14 NOTE — TELEPHONE ENCOUNTER
Contacted the patient regarding breast clinic referral.  The patient did not answer, message left with my name and direct number for patient to contact back.

## 2020-06-11 ENCOUNTER — TELEPHONE (OUTPATIENT)
Dept: SURGERY | Facility: CLINIC | Age: 45
End: 2020-06-11

## 2020-06-11 NOTE — TELEPHONE ENCOUNTER
Contacted the patient regarding breast clinic referral.  The patient declined the appointment at this time stating she is no longer having breast pain and her breast has returned to it's normal color.  Stated to the patient that she will like to schedule an appointment in the future, to contact our office.  The patient voiced understanding.

## 2020-06-26 ENCOUNTER — TELEPHONE (OUTPATIENT)
Dept: SURGERY | Facility: CLINIC | Age: 45
End: 2020-06-26

## 2020-06-26 NOTE — TELEPHONE ENCOUNTER
The patient called in to scheduled an appointment for breast exam stating she is feeling something different in her axilla.  Patient scheduled to see Stefanie Alvarenga PA-C on Monday 6/29/2020, 10:30 am at [facility].  The patient voiced understanding of appointment date, time, and location.  Reminder letter mailed to the patient.

## 2020-06-29 ENCOUNTER — TELEPHONE (OUTPATIENT)
Dept: SURGERY | Facility: CLINIC | Age: 45
End: 2020-06-29

## 2020-06-29 ENCOUNTER — OFFICE VISIT (OUTPATIENT)
Dept: SURGERY | Facility: CLINIC | Age: 45
End: 2020-06-29
Payer: COMMERCIAL

## 2020-06-29 ENCOUNTER — HOSPITAL ENCOUNTER (OUTPATIENT)
Dept: RADIOLOGY | Facility: HOSPITAL | Age: 45
Discharge: HOME | End: 2020-06-29
Attending: PHYSICIAN ASSISTANT
Payer: COMMERCIAL

## 2020-06-29 VITALS
BODY MASS INDEX: 33.17 KG/M2 | WEIGHT: 175.69 LBS | SYSTOLIC BLOOD PRESSURE: 136 MMHG | HEIGHT: 61 IN | DIASTOLIC BLOOD PRESSURE: 85 MMHG | HEART RATE: 88 BPM

## 2020-06-29 DIAGNOSIS — N63.0 BREAST LUMP: Primary | ICD-10-CM

## 2020-06-29 DIAGNOSIS — N63.0 BREAST LUMP: ICD-10-CM

## 2020-06-29 PROCEDURE — 76642 US BREAST RIGHT LIMITED: ICD-10-PCS | Mod: 26,RT,, | Performed by: RADIOLOGY

## 2020-06-29 PROCEDURE — 99999 PR PBB SHADOW E&M-EST. PATIENT-LVL III: ICD-10-PCS | Mod: PBBFAC,,, | Performed by: PHYSICIAN ASSISTANT

## 2020-06-29 PROCEDURE — 99213 PR OFFICE/OUTPT VISIT, EST, LEVL III, 20-29 MIN: ICD-10-PCS | Mod: S$GLB,,, | Performed by: PHYSICIAN ASSISTANT

## 2020-06-29 PROCEDURE — 99999 PR PBB SHADOW E&M-EST. PATIENT-LVL III: CPT | Mod: PBBFAC,,, | Performed by: PHYSICIAN ASSISTANT

## 2020-06-29 PROCEDURE — 99213 OFFICE O/P EST LOW 20 MIN: CPT | Mod: S$GLB,,, | Performed by: PHYSICIAN ASSISTANT

## 2020-06-29 PROCEDURE — 76642 ULTRASOUND BREAST LIMITED: CPT | Mod: 26,RT,, | Performed by: RADIOLOGY

## 2020-06-29 PROCEDURE — 76642 ULTRASOUND BREAST LIMITED: CPT | Mod: TC,PO,RT

## 2020-06-29 NOTE — LETTER
June 29, 2020      Ronaldo Jade IV, MD  [address]  [address]  [facility] [address]           [facility]  [address], [address]  [facility] [address]  Phone: [phone]  Fax: [phone]          Patient: Liliana Wright   MR Number: [id number]   YOB: 1975   Date of Visit: 6/29/2020       Dear Dr. Ronaldo Jade IV:    Thank you for referring Liliana Wright to me for evaluation. Attached you will find relevant portions of my assessment and plan of care.    If you have questions, please do not hesitate to call me. I look forward to following Liliana Wright along with you.    Sincerely,    VIOLET Lao    Enclosure  CC:  No Recipients    If you would like to receive this communication electronically, please contact [email or web address] or [phone] to request more information on [employer] access.    For providers and/or their staff who would like to refer a patient to [facility], please contact us through our one-stop-shop provider referral line, [facility], at [phone].    If you feel you have received this communication in error or would no longer like to receive these types of communications, please e-mail [email or web address]

## 2020-06-29 NOTE — PROGRESS NOTES
[facility]  [facility] [place]  6/29/2020      SUBJECTIVE:   Ms. Liliana Wright is a 45 y.o. female who presents today complaining of an asymmetric right breast mass.    History of Present Illness: Patient was previously seen by JOAQUINA Lipscomb in 2018 for right breast pain. Patient states this has resolved.   Her last bilateral screening mammogram was on 3/3/20 and read as BIRADS 1, negative.    She denies any history of breast biopsies.  She denies any nipple discharge or nipple inversion.  She denies palpating any left breast masses.    Patient first noticed the asymmetry in her right breast a few days ago when doing a self breast exam.     Family History: Maternal grandmother had breast cancer in her 60's.    TC Score: 15.71%    Review of Systems: Denies any chest pain or shortness of breath.  Denies any fever or chills.  See HPI/ Interval History for other systems reviewed.    OBJECTIVE:   Vitals:    06/29/20 1044   BP: 136/85   Pulse: 88     Physical Exam:  HEENT: Normocephalic, atraumatic.    General: alert and oriented; no acute distress.  Breast: 0.8 cm somewhat mobile tender lump at 5 o'clock right breast, N+4 cm (marked for imaging). No left breast masses. Normal color and contour of right and left breast.  No nipple inversion or discharge bilaterally.    Lymph: No palpable adjacent axillary lymph nodes.      ASSESSMENT:  Ms. Liliana Wright is a 45 y.o. year old female with a right breast lump.     PLAN:   We discussed that the lump detected on exam today is likely a breast cyst; however, further imaging with a right breast focused ultrasound was recommended.  I did not feel any abnormalities at either axilla or UOQ where the patient felt an area of asymmetry and possible mass.  She can follow up with me as needed.    ~Stefanie Alvarenga PA-C      Surgical Oncology            6/29/2020

## 2020-06-29 NOTE — TELEPHONE ENCOUNTER
Contacted the pt. Patient did not answer, left a detailed v/m regarding coming inside for her appt.        ----- Message from Rupesh Hernandez sent at 6/29/2020 10:32 AM CDT -----  Regarding: Pt is outside        The Pt is outside and she wanted you to know.    Phone # [phone]

## 2020-09-02 ENCOUNTER — PATIENT OUTREACH (OUTPATIENT)
Dept: ADMINISTRATIVE | Facility: OTHER | Age: 45
End: 2020-09-02

## 2020-09-03 ENCOUNTER — OFFICE VISIT (OUTPATIENT)
Dept: DERMATOLOGY | Facility: CLINIC | Age: 45
End: 2020-09-03
Payer: COMMERCIAL

## 2020-09-03 DIAGNOSIS — L94.0 MORPHEA: ICD-10-CM

## 2020-09-03 DIAGNOSIS — D17.0 LIPOMA OF SCALP: Primary | ICD-10-CM

## 2020-09-03 DIAGNOSIS — D22.9 NEVUS: ICD-10-CM

## 2020-09-03 PROCEDURE — 99999 PR PBB SHADOW E&M-EST. PATIENT-LVL II: ICD-10-PCS | Mod: PBBFAC,,, | Performed by: DERMATOLOGY

## 2020-09-03 PROCEDURE — 99213 PR OFFICE/OUTPT VISIT, EST, LEVL III, 20-29 MIN: ICD-10-PCS | Mod: S$GLB,,, | Performed by: DERMATOLOGY

## 2020-09-03 PROCEDURE — 99213 OFFICE O/P EST LOW 20 MIN: CPT | Mod: S$GLB,,, | Performed by: DERMATOLOGY

## 2020-09-03 PROCEDURE — 99999 PR PBB SHADOW E&M-EST. PATIENT-LVL II: CPT | Mod: PBBFAC,,, | Performed by: DERMATOLOGY

## 2020-09-03 NOTE — PROGRESS NOTES
Subjective:       Patient ID:  Liliana Wright is a 45 y.o. female who presents for   Chief Complaint   Patient presents with    Lesion     Pt here today to have lesion to forehead - bx proven lipoma and L flank  Morphea rechecked. Pt states there has not been any changes to either area. Denies any pain or bleeding.     Lesion        Review of Systems   Skin: Positive for daily sunscreen use and activity-related sunscreen use. Negative for tendency to form keloidal scars.   Hematologic/Lymphatic: Does not bruise/bleed easily.        Objective:    Physical Exam   Constitutional: She appears well-developed and well-nourished. No distress.   Neurological: She is alert and oriented to person, place, and time. She is not disoriented.   Psychiatric: She has a normal mood and affect.   Skin:   Areas Examined (abnormalities noted in diagram):   Scalp / Hair Palpated and Inspected  Abdomen Inspection Performed  Back Inspection Performed                   Diagram Legend     Erythematous scaling macule/papule c/w actinic keratosis       Vascular papule c/w angioma      Pigmented verrucoid papule/plaque c/w seborrheic keratosis      Yellow umbilicated papule c/w sebaceous hyperplasia      Irregularly shaped tan macule c/w lentigo     1-2 mm smooth white papules consistent with Milia      Movable subcutaneous cyst with punctum c/w epidermal inclusion cyst      Subcutaneous movable cyst c/w pilar cyst      Firm pink to brown papule c/w dermatofibroma      Pedunculated fleshy papule(s) c/w skin tag(s)      Evenly pigmented macule c/w junctional nevus     Mildly variegated pigmented, slightly irregular-bordered macule c/w mildly atypical nevus      Flesh colored to evenly pigmented papule c/w intradermal nevus       Pink pearly papule/plaque c/w basal cell carcinoma      Erythematous hyperkeratotic cursted plaque c/w SCC      Surgical scar with no sign of skin cancer recurrence      Open and closed comedones      Inflammatory  papules and pustules      Verrucoid papule consistent consistent with wart     Erythematous eczematous patches and plaques     Dystrophic onycholytic nail with subungual debris c/w onychomycosis     Umbilicated papule    Erythematous-base heme-crusted tan verrucoid plaque consistent with inflamed seborrheic keratosis     Erythematous Silvery Scaling Plaque c/w Psoriasis     See annotation      Assessment / Plan:        Lipoma of scalp v osteoma  Has not changed and pt asymptomatic  bx in 2018  Monitor for changes in size or symptoms      Morphea  In remission  Just has post inflammatory changes at this time     Nevus  Discussed ABCDE's of nevi.  Monitor for new mole or moles that are becoming bigger, darker, irritated, or developing irregular borders. Brochure provided.             Follow up in about 2 years (around 9/3/2022).

## 2020-09-23 ENCOUNTER — LAB VISIT (OUTPATIENT)
Dept: LAB | Facility: OTHER | Age: 45
End: 2020-09-23
Attending: INTERNAL MEDICINE
Payer: COMMERCIAL

## 2020-09-23 ENCOUNTER — OFFICE VISIT (OUTPATIENT)
Dept: INTERNAL MEDICINE | Facility: CLINIC | Age: 45
End: 2020-09-23
Attending: INTERNAL MEDICINE
Payer: COMMERCIAL

## 2020-09-23 VITALS
HEIGHT: 61 IN | DIASTOLIC BLOOD PRESSURE: 84 MMHG | BODY MASS INDEX: 29.97 KG/M2 | WEIGHT: 158.75 LBS | OXYGEN SATURATION: 99 % | SYSTOLIC BLOOD PRESSURE: 124 MMHG | HEART RATE: 82 BPM

## 2020-09-23 DIAGNOSIS — R70.0 ELEVATED SED RATE: ICD-10-CM

## 2020-09-23 DIAGNOSIS — Z00.00 ANNUAL PHYSICAL EXAM: ICD-10-CM

## 2020-09-23 DIAGNOSIS — D50.9 IRON DEFICIENCY ANEMIA, UNSPECIFIED IRON DEFICIENCY ANEMIA TYPE: ICD-10-CM

## 2020-09-23 DIAGNOSIS — E55.9 VITAMIN D DEFICIENCY: ICD-10-CM

## 2020-09-23 DIAGNOSIS — Z00.00 ANNUAL PHYSICAL EXAM: Primary | ICD-10-CM

## 2020-09-23 LAB
ALBUMIN SERPL BCP-MCNC: 4.2 G/DL (ref 3.5–5.2)
ALP SERPL-CCNC: 79 U/L (ref 55–135)
ALT SERPL W/O P-5'-P-CCNC: 31 U/L (ref 10–44)
ANION GAP SERPL CALC-SCNC: 10 MMOL/L (ref 8–16)
AST SERPL-CCNC: 25 U/L (ref 10–40)
BASOPHILS # BLD AUTO: 0.03 K/UL (ref 0–0.2)
BASOPHILS NFR BLD: 0.4 % (ref 0–1.9)
BILIRUB SERPL-MCNC: 0.7 MG/DL (ref 0.1–1)
BUN SERPL-MCNC: 8 MG/DL (ref 6–20)
CALCIUM SERPL-MCNC: 9.5 MG/DL (ref 8.7–10.5)
CHLORIDE SERPL-SCNC: 105 MMOL/L (ref 95–110)
CO2 SERPL-SCNC: 22 MMOL/L (ref 23–29)
CREAT SERPL-MCNC: 0.7 MG/DL (ref 0.5–1.4)
DIFFERENTIAL METHOD: ABNORMAL
EOSINOPHIL # BLD AUTO: 0 K/UL (ref 0–0.5)
EOSINOPHIL NFR BLD: 0.3 % (ref 0–8)
ERYTHROCYTE [DISTWIDTH] IN BLOOD BY AUTOMATED COUNT: 14.1 % (ref 11.5–14.5)
ERYTHROCYTE [SEDIMENTATION RATE] IN BLOOD: 47 MM/HR (ref 0–20)
EST. GFR  (AFRICAN AMERICAN): >60 ML/MIN/1.73 M^2
EST. GFR  (NON AFRICAN AMERICAN): >60 ML/MIN/1.73 M^2
GLUCOSE SERPL-MCNC: 82 MG/DL (ref 70–110)
HCT VFR BLD AUTO: 38.4 % (ref 37–48.5)
HGB BLD-MCNC: 12.3 G/DL (ref 12–16)
IMM GRANULOCYTES # BLD AUTO: 0.02 K/UL (ref 0–0.04)
IMM GRANULOCYTES NFR BLD AUTO: 0.3 % (ref 0–0.5)
LYMPHOCYTES # BLD AUTO: 2.4 K/UL (ref 1–4.8)
LYMPHOCYTES NFR BLD: 34.2 % (ref 18–48)
MCH RBC QN AUTO: 26.7 PG (ref 27–31)
MCHC RBC AUTO-ENTMCNC: 32 G/DL (ref 32–36)
MCV RBC AUTO: 83 FL (ref 82–98)
MONOCYTES # BLD AUTO: 0.3 K/UL (ref 0.3–1)
MONOCYTES NFR BLD: 3.5 % (ref 4–15)
NEUTROPHILS # BLD AUTO: 4.3 K/UL (ref 1.8–7.7)
NEUTROPHILS NFR BLD: 61.3 % (ref 38–73)
NRBC BLD-RTO: 0 /100 WBC
PLATELET # BLD AUTO: 298 K/UL (ref 150–350)
PMV BLD AUTO: 11.5 FL (ref 9.2–12.9)
POTASSIUM SERPL-SCNC: 3.9 MMOL/L (ref 3.5–5.1)
PROT SERPL-MCNC: 8.1 G/DL (ref 6–8.4)
RBC # BLD AUTO: 4.61 M/UL (ref 4–5.4)
SODIUM SERPL-SCNC: 137 MMOL/L (ref 136–145)
TSH SERPL DL<=0.005 MIU/L-ACNC: 0.92 UIU/ML (ref 0.4–4)
WBC # BLD AUTO: 7.08 K/UL (ref 3.9–12.7)

## 2020-09-23 PROCEDURE — 99396 PR PREVENTIVE VISIT,EST,40-64: ICD-10-PCS | Mod: S$GLB,,, | Performed by: INTERNAL MEDICINE

## 2020-09-23 PROCEDURE — 82728 ASSAY OF FERRITIN: CPT

## 2020-09-23 PROCEDURE — 99999 PR PBB SHADOW E&M-EST. PATIENT-LVL III: ICD-10-PCS | Mod: PBBFAC,,, | Performed by: INTERNAL MEDICINE

## 2020-09-23 PROCEDURE — 83540 ASSAY OF IRON: CPT

## 2020-09-23 PROCEDURE — 82306 VITAMIN D 25 HYDROXY: CPT

## 2020-09-23 PROCEDURE — 80061 LIPID PANEL: CPT

## 2020-09-23 PROCEDURE — 36415 COLL VENOUS BLD VENIPUNCTURE: CPT

## 2020-09-23 PROCEDURE — 84443 ASSAY THYROID STIM HORMONE: CPT

## 2020-09-23 PROCEDURE — 85025 COMPLETE CBC W/AUTO DIFF WBC: CPT

## 2020-09-23 PROCEDURE — 99396 PREV VISIT EST AGE 40-64: CPT | Mod: S$GLB,,, | Performed by: INTERNAL MEDICINE

## 2020-09-23 PROCEDURE — 99999 PR PBB SHADOW E&M-EST. PATIENT-LVL III: CPT | Mod: PBBFAC,,, | Performed by: INTERNAL MEDICINE

## 2020-09-23 PROCEDURE — 85651 RBC SED RATE NONAUTOMATED: CPT

## 2020-09-23 PROCEDURE — 80053 COMPREHEN METABOLIC PANEL: CPT

## 2020-09-23 NOTE — PROGRESS NOTES
"Subjective:   Patient ID: Liliana Wright is a 45 y.o. 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    Elevated sed rate:  - improved over time   Reports only with joint pain right before period and then starts and sx resolve   No redness or swelling or inc warmth     Hand tumbness when sleeping - will wake her up - occasionally   - no neck pain   - discussed further eval of hand numbness and pt would like conservative mgmt at this time     Noticed decreased libido with inc stress - discussed increasing exercise and stress management    Exercising biking 7 mi - stopped in July when school resumed     Review of Systems      Objective:  Vitals:    09/23/20 1140   BP: 124/84   BP Location: Left arm   Patient Position: Sitting   Pulse: 82   SpO2: 99%   Weight: 72 kg (158 lb 11.7 oz)   Height: 5' 1" (1.549 m)     Body mass index is 29.99 kg/m².    Physical Exam  Vitals signs reviewed.   Constitutional:       Appearance: Normal appearance. She is well-developed.   HENT:      Head: Normocephalic and atraumatic.      Right Ear: Tympanic membrane, ear canal and external ear normal.      Left Ear: Tympanic membrane, ear canal and external ear normal.      Nose:      Comments: Wearing mask  Eyes:      Extraocular Movements: Extraocular movements intact.      Conjunctiva/sclera: Conjunctivae normal.   Neck:      Musculoskeletal: Normal range of motion and neck supple.      Thyroid: No thyromegaly.   Cardiovascular:      Rate and Rhythm: Normal rate and regular rhythm.      Pulses: Normal pulses.      Heart sounds: Normal heart sounds. "   Pulmonary:      Effort: Pulmonary effort is normal. No respiratory distress.      Breath sounds: Normal breath sounds. No stridor. No wheezing or rales.   Chest:      Chest wall: No tenderness.   Abdominal:      General: Bowel sounds are normal.      Palpations: Abdomen is soft.   Musculoskeletal:         General: No swelling or tenderness.   Lymphadenopathy:      Cervical: No cervical adenopathy.   Skin:     General: Skin is warm and dry.      Capillary Refill: Capillary refill takes less than 2 seconds.      Nails: There is no clubbing.     Neurological:      General: No focal deficit present.      Mental Status: She is alert and oriented to person, place, and time. Mental status is at baseline.      Gait: Gait normal.   Psychiatric:         Speech: Speech normal.         Behavior: Behavior normal.         Thought Content: Thought content normal.       Assessment:  1. Annual physical exam    2. Iron deficiency anemia, unspecified iron deficiency anemia type    3. Vitamin D deficiency    4. Elevated sed rate        Plan:  Liliana was seen today for chest pain.    Diagnoses and all orders for this visit:    Check approp labs   Recommend daily sunscreen, cardiovascular exercise min 30 min 5 days per week. Seatbelts routinely.  If sx worsen above will order EMG  Stress mgmt - consider trial of zoloft in future if needed   All questions were answered and pt verbalized understanding of plan.   See gyn annually   mmg annually   Declines flu vaccine     Health Maintenance   Topic Date Due    Mammogram  03/03/2021    TETANUS VACCINE  03/01/2029    Hepatitis C Screening  Completed    Lipid Panel  Completed

## 2020-09-24 ENCOUNTER — PATIENT MESSAGE (OUTPATIENT)
Dept: INTERNAL MEDICINE | Facility: CLINIC | Age: 45
End: 2020-09-24

## 2020-09-24 LAB
25(OH)D3+25(OH)D2 SERPL-MCNC: 34 NG/ML (ref 30–96)
CHOLEST SERPL-MCNC: 227 MG/DL (ref 120–199)
CHOLEST/HDLC SERPL: 5.8 {RATIO} (ref 2–5)
FERRITIN SERPL-MCNC: 44 NG/ML (ref 20–300)
HDLC SERPL-MCNC: 39 MG/DL (ref 40–75)
HDLC SERPL: 17.2 % (ref 20–50)
IRON SERPL-MCNC: 60 UG/DL (ref 30–160)
LDLC SERPL CALC-MCNC: 155.2 MG/DL (ref 63–159)
NONHDLC SERPL-MCNC: 188 MG/DL
SATURATED IRON: 15 % (ref 20–50)
TOTAL IRON BINDING CAPACITY: 388 UG/DL (ref 250–450)
TRANSFERRIN SERPL-MCNC: 262 MG/DL (ref 200–375)
TRIGL SERPL-MCNC: 164 MG/DL (ref 30–150)

## 2020-09-25 ENCOUNTER — PATIENT MESSAGE (OUTPATIENT)
Dept: INTERNAL MEDICINE | Facility: CLINIC | Age: 45
End: 2020-09-25

## 2020-09-28 ENCOUNTER — TELEPHONE (OUTPATIENT)
Dept: INTERNAL MEDICINE | Facility: CLINIC | Age: 45
End: 2020-09-28

## 2020-09-28 DIAGNOSIS — R70.0 ELEVATED SED RATE: Primary | ICD-10-CM

## 2020-09-28 NOTE — TELEPHONE ENCOUNTER
Message sent to pt via my chart with results and updates to plan.     Please arrange sed rate in 8 weeks

## 2020-11-24 ENCOUNTER — PATIENT MESSAGE (OUTPATIENT)
Dept: DERMATOLOGY | Facility: CLINIC | Age: 45
End: 2020-11-24

## 2020-12-16 ENCOUNTER — OFFICE VISIT (OUTPATIENT)
Dept: DERMATOLOGY | Facility: CLINIC | Age: 45
End: 2020-12-16
Payer: COMMERCIAL

## 2020-12-16 DIAGNOSIS — D22.9 NEVUS: ICD-10-CM

## 2020-12-16 DIAGNOSIS — D18.01 CHERRY ANGIOMA: Primary | ICD-10-CM

## 2020-12-16 DIAGNOSIS — L91.8 SKIN TAG: ICD-10-CM

## 2020-12-16 PROCEDURE — 99213 PR OFFICE/OUTPT VISIT, EST, LEVL III, 20-29 MIN: ICD-10-PCS | Mod: S$GLB,,, | Performed by: DERMATOLOGY

## 2020-12-16 PROCEDURE — 99999 PR PBB SHADOW E&M-EST. PATIENT-LVL II: CPT | Mod: PBBFAC,,, | Performed by: DERMATOLOGY

## 2020-12-16 PROCEDURE — 99999 PR PBB SHADOW E&M-EST. PATIENT-LVL II: ICD-10-PCS | Mod: PBBFAC,,, | Performed by: DERMATOLOGY

## 2020-12-16 PROCEDURE — 99213 OFFICE O/P EST LOW 20 MIN: CPT | Mod: S$GLB,,, | Performed by: DERMATOLOGY

## 2020-12-16 NOTE — PROGRESS NOTES
Subjective:       Patient ID:  Liliana Wright is a 45 y.o. female who presents for   Chief Complaint   Patient presents with    Lesion     Pt c/o dark colored lesion on right flank x 3 weeks. No bleeding, pain or prev tx. Pt deferred skin check today.  Also complains of red lesions on arms and trunk. Increasing in number. No tx.       Review of Systems   Skin: Negative for tendency to form keloidal scars.   Hematologic/Lymphatic: Does not bruise/bleed easily.        Objective:    Physical Exam   Skin:   Areas Examined (abnormalities noted in diagram):   Head / Face Inspection Performed  Abdomen Inspection Performed  Back Inspection Performed                   Diagram Legend     Erythematous scaling macule/papule c/w actinic keratosis       Vascular papule c/w angioma      Pigmented verrucoid papule/plaque c/w seborrheic keratosis      Yellow umbilicated papule c/w sebaceous hyperplasia      Irregularly shaped tan macule c/w lentigo     1-2 mm smooth white papules consistent with Milia      Movable subcutaneous cyst with punctum c/w epidermal inclusion cyst      Subcutaneous movable cyst c/w pilar cyst      Firm pink to brown papule c/w dermatofibroma      Pedunculated fleshy papule(s) c/w skin tag(s)      Evenly pigmented macule c/w junctional nevus     Mildly variegated pigmented, slightly irregular-bordered macule c/w mildly atypical nevus      Flesh colored to evenly pigmented papule c/w intradermal nevus       Pink pearly papule/plaque c/w basal cell carcinoma      Erythematous hyperkeratotic cursted plaque c/w SCC      Surgical scar with no sign of skin cancer recurrence      Open and closed comedones      Inflammatory papules and pustules      Verrucoid papule consistent consistent with wart     Erythematous eczematous patches and plaques     Dystrophic onycholytic nail with subungual debris c/w onychomycosis     Umbilicated papule    Erythematous-base heme-crusted tan verrucoid plaque consistent with  inflamed seborrheic keratosis     Erythematous Silvery Scaling Plaque c/w Psoriasis     See annotation      Assessment / Plan:        Cherry angioma  These are benign vascular lesions that are inherited.  Treatment is not necessary.    Nevus  Discussed ABCDE's of nevi.  Monitor for new mole or moles that are becoming bigger, darker, irritated, or developing irregular borders. Brochure provided.    Skin tag  Reassurance given to patient. No treatment is necessary.   Treatment of benign, asymptomatic lesions may be considered cosmetic.               Follow up if symptoms worsen or fail to improve.

## 2020-12-28 ENCOUNTER — PATIENT MESSAGE (OUTPATIENT)
Dept: INTERNAL MEDICINE | Facility: CLINIC | Age: 45
End: 2020-12-28

## 2021-01-14 ENCOUNTER — PATIENT MESSAGE (OUTPATIENT)
Dept: DERMATOLOGY | Facility: CLINIC | Age: 46
End: 2021-01-14

## 2021-01-20 ENCOUNTER — OFFICE VISIT (OUTPATIENT)
Dept: DERMATOLOGY | Facility: CLINIC | Age: 46
End: 2021-01-20
Payer: COMMERCIAL

## 2021-01-20 DIAGNOSIS — L30.9 HAND ECZEMA: Primary | ICD-10-CM

## 2021-01-20 PROCEDURE — 99213 OFFICE O/P EST LOW 20 MIN: CPT | Mod: 95,,, | Performed by: DERMATOLOGY

## 2021-01-20 PROCEDURE — 99213 PR OFFICE/OUTPT VISIT, EST, LEVL III, 20-29 MIN: ICD-10-PCS | Mod: 95,,, | Performed by: DERMATOLOGY

## 2021-01-20 RX ORDER — TRIAMCINOLONE ACETONIDE 1 MG/G
OINTMENT TOPICAL
Qty: 60 G | Refills: 1 | Status: SHIPPED | OUTPATIENT
Start: 2021-01-20 | End: 2021-02-24 | Stop reason: SDUPTHER

## 2021-02-23 ENCOUNTER — PATIENT MESSAGE (OUTPATIENT)
Dept: INTERNAL MEDICINE | Facility: CLINIC | Age: 46
End: 2021-02-23

## 2021-02-23 DIAGNOSIS — Z12.31 ENCOUNTER FOR SCREENING MAMMOGRAM FOR BREAST CANCER: Primary | ICD-10-CM

## 2021-02-24 ENCOUNTER — OFFICE VISIT (OUTPATIENT)
Dept: DERMATOLOGY | Facility: CLINIC | Age: 46
End: 2021-02-24
Payer: COMMERCIAL

## 2021-02-24 DIAGNOSIS — L30.9 HAND ECZEMA: Primary | ICD-10-CM

## 2021-02-24 DIAGNOSIS — L94.0 MORPHEA: ICD-10-CM

## 2021-02-24 DIAGNOSIS — D17.0 LIPOMA OF SCALP: ICD-10-CM

## 2021-02-24 PROCEDURE — 99999 PR PBB SHADOW E&M-EST. PATIENT-LVL II: CPT | Mod: PBBFAC,,, | Performed by: DERMATOLOGY

## 2021-02-24 PROCEDURE — 99214 PR OFFICE/OUTPT VISIT, EST, LEVL IV, 30-39 MIN: ICD-10-PCS | Mod: S$GLB,,, | Performed by: DERMATOLOGY

## 2021-02-24 PROCEDURE — 99999 PR PBB SHADOW E&M-EST. PATIENT-LVL II: ICD-10-PCS | Mod: PBBFAC,,, | Performed by: DERMATOLOGY

## 2021-02-24 PROCEDURE — 99214 OFFICE O/P EST MOD 30 MIN: CPT | Mod: S$GLB,,, | Performed by: DERMATOLOGY

## 2021-02-24 RX ORDER — TRIAMCINOLONE ACETONIDE 1 MG/G
OINTMENT TOPICAL
Qty: 45 G | Refills: 1 | Status: SHIPPED | OUTPATIENT
Start: 2021-02-24 | End: 2021-04-29

## 2021-03-04 ENCOUNTER — HOSPITAL ENCOUNTER (OUTPATIENT)
Dept: RADIOLOGY | Facility: HOSPITAL | Age: 46
Discharge: HOME | End: 2021-03-04
Attending: INTERNAL MEDICINE
Payer: COMMERCIAL

## 2021-03-04 DIAGNOSIS — Z12.31 ENCOUNTER FOR SCREENING MAMMOGRAM FOR BREAST CANCER: ICD-10-CM

## 2021-03-04 PROCEDURE — 77063 MAMMO DIGITAL SCREENING BILAT WITH TOMO: ICD-10-PCS | Mod: 26,,, | Performed by: RADIOLOGY

## 2021-03-04 PROCEDURE — 77063 BREAST TOMOSYNTHESIS BI: CPT | Mod: 26,,, | Performed by: RADIOLOGY

## 2021-03-04 PROCEDURE — 77067 SCR MAMMO BI INCL CAD: CPT | Mod: TC

## 2021-03-04 PROCEDURE — 77067 MAMMO DIGITAL SCREENING BILAT WITH TOMO: ICD-10-PCS | Mod: 26,,, | Performed by: RADIOLOGY

## 2021-03-04 PROCEDURE — 77067 SCR MAMMO BI INCL CAD: CPT | Mod: 26,,, | Performed by: RADIOLOGY

## 2021-03-11 ENCOUNTER — PATIENT MESSAGE (OUTPATIENT)
Dept: INTERNAL MEDICINE | Facility: CLINIC | Age: 46
End: 2021-03-11

## 2021-03-16 ENCOUNTER — IMMUNIZATION (OUTPATIENT)
Dept: OBSTETRICS AND GYNECOLOGY | Facility: CLINIC | Age: 46
End: 2021-03-16
Payer: COMMERCIAL

## 2021-03-16 DIAGNOSIS — Z23 NEED FOR VACCINATION: Primary | ICD-10-CM

## 2021-03-16 PROCEDURE — 91300 COVID-19, MRNA, LNP-S, PF, 30 MCG/0.3 ML DOSE VACCINE: CPT | Mod: PBBFAC | Performed by: FAMILY MEDICINE

## 2021-04-06 ENCOUNTER — IMMUNIZATION (OUTPATIENT)
Dept: OBSTETRICS AND GYNECOLOGY | Facility: CLINIC | Age: 46
End: 2021-04-06
Payer: COMMERCIAL

## 2021-04-06 DIAGNOSIS — Z23 NEED FOR VACCINATION: Primary | ICD-10-CM

## 2021-04-06 PROCEDURE — 0002A COVID-19, MRNA, LNP-S, PF, 30 MCG/0.3 ML DOSE VACCINE: CPT | Mod: PBBFAC | Performed by: FAMILY MEDICINE

## 2021-04-06 PROCEDURE — 91300 COVID-19, MRNA, LNP-S, PF, 30 MCG/0.3 ML DOSE VACCINE: CPT | Mod: PBBFAC | Performed by: FAMILY MEDICINE

## 2021-04-29 ENCOUNTER — OFFICE VISIT (OUTPATIENT)
Dept: OBSTETRICS AND GYNECOLOGY | Facility: CLINIC | Age: 46
End: 2021-04-29
Payer: COMMERCIAL

## 2021-04-29 VITALS
DIASTOLIC BLOOD PRESSURE: 78 MMHG | HEIGHT: 61 IN | SYSTOLIC BLOOD PRESSURE: 122 MMHG | BODY MASS INDEX: 33.09 KG/M2 | WEIGHT: 175.25 LBS

## 2021-04-29 DIAGNOSIS — Z12.4 CERVICAL CANCER SCREENING: ICD-10-CM

## 2021-04-29 DIAGNOSIS — Z01.419 ENCOUNTER FOR GYNECOLOGICAL EXAMINATION WITHOUT ABNORMAL FINDING: Primary | ICD-10-CM

## 2021-04-29 DIAGNOSIS — Z12.31 SCREENING MAMMOGRAM, ENCOUNTER FOR: ICD-10-CM

## 2021-04-29 PROCEDURE — 88142 CYTOPATH C/V THIN LAYER: CPT | Performed by: OBSTETRICS & GYNECOLOGY

## 2021-04-29 PROCEDURE — 99396 PREV VISIT EST AGE 40-64: CPT | Mod: S$GLB,,, | Performed by: OBSTETRICS & GYNECOLOGY

## 2021-04-29 PROCEDURE — 99999 PR PBB SHADOW E&M-EST. PATIENT-LVL III: ICD-10-PCS | Mod: PBBFAC,,, | Performed by: OBSTETRICS & GYNECOLOGY

## 2021-04-29 PROCEDURE — 99999 PR PBB SHADOW E&M-EST. PATIENT-LVL III: CPT | Mod: PBBFAC,,, | Performed by: OBSTETRICS & GYNECOLOGY

## 2021-04-29 PROCEDURE — 87624 HPV HI-RISK TYP POOLED RSLT: CPT | Performed by: OBSTETRICS & GYNECOLOGY

## 2021-04-29 PROCEDURE — 99396 PR PREVENTIVE VISIT,EST,40-64: ICD-10-PCS | Mod: S$GLB,,, | Performed by: OBSTETRICS & GYNECOLOGY

## 2021-05-06 LAB
FINAL PATHOLOGIC DIAGNOSIS: NORMAL
Lab: NORMAL

## 2021-05-11 LAB
HPV HR 12 DNA SPEC QL NAA+PROBE: NEGATIVE
HPV16 AG SPEC QL: NEGATIVE
HPV18 DNA SPEC QL NAA+PROBE: NEGATIVE

## 2021-05-18 ENCOUNTER — PATIENT MESSAGE (OUTPATIENT)
Dept: OBSTETRICS AND GYNECOLOGY | Facility: CLINIC | Age: 46
End: 2021-05-18

## 2021-09-26 ENCOUNTER — PATIENT MESSAGE (OUTPATIENT)
Dept: INTERNAL MEDICINE | Facility: CLINIC | Age: 46
End: 2021-09-26

## 2021-11-15 ENCOUNTER — IMMUNIZATION (OUTPATIENT)
Dept: PRIMARY CARE CLINIC | Facility: CLINIC | Age: 46
End: 2021-11-15
Payer: COMMERCIAL

## 2021-11-15 DIAGNOSIS — Z23 NEED FOR VACCINATION: Primary | ICD-10-CM

## 2021-11-15 PROCEDURE — 0004A COVID-19, MRNA, LNP-S, PF, 30 MCG/0.3 ML DOSE VACCINE: CPT | Mod: CV19,PBBFAC | Performed by: INTERNAL MEDICINE

## 2021-12-01 ENCOUNTER — HOSPITAL ENCOUNTER (OUTPATIENT)
Dept: RADIOLOGY | Facility: OTHER | Age: 46
Discharge: HOME | End: 2021-12-01
Attending: INTERNAL MEDICINE
Payer: COMMERCIAL

## 2021-12-01 ENCOUNTER — OFFICE VISIT (OUTPATIENT)
Dept: INTERNAL MEDICINE | Facility: CLINIC | Age: 46
End: 2021-12-01
Attending: INTERNAL MEDICINE
Payer: COMMERCIAL

## 2021-12-01 ENCOUNTER — PATIENT MESSAGE (OUTPATIENT)
Dept: INTERNAL MEDICINE | Facility: CLINIC | Age: 46
End: 2021-12-01

## 2021-12-01 VITALS
WEIGHT: 178.56 LBS | HEART RATE: 88 BPM | DIASTOLIC BLOOD PRESSURE: 82 MMHG | OXYGEN SATURATION: 99 % | HEIGHT: 61 IN | SYSTOLIC BLOOD PRESSURE: 100 MMHG | BODY MASS INDEX: 33.71 KG/M2

## 2021-12-01 DIAGNOSIS — E66.9 OBESITY (BMI 30.0-34.9): ICD-10-CM

## 2021-12-01 DIAGNOSIS — R19.02 LEFT UPPER QUADRANT ABDOMINAL MASS: ICD-10-CM

## 2021-12-01 DIAGNOSIS — L94.0 MORPHEA: ICD-10-CM

## 2021-12-01 DIAGNOSIS — Z00.00 ANNUAL PHYSICAL EXAM: Primary | ICD-10-CM

## 2021-12-01 DIAGNOSIS — F41.9 ANXIETY: ICD-10-CM

## 2021-12-01 DIAGNOSIS — E55.9 VITAMIN D DEFICIENCY: ICD-10-CM

## 2021-12-01 PROBLEM — E66.811 OBESITY (BMI 30.0-34.9): Status: ACTIVE | Noted: 2021-12-01

## 2021-12-01 PROCEDURE — 99396 PREV VISIT EST AGE 40-64: CPT | Mod: S$GLB,,, | Performed by: INTERNAL MEDICINE

## 2021-12-01 PROCEDURE — 76705 ECHO EXAM OF ABDOMEN: CPT | Mod: TC

## 2021-12-01 PROCEDURE — 76705 US SOFT TISSUE, ABDOMEN: ICD-10-PCS | Mod: 26,,, | Performed by: RADIOLOGY

## 2021-12-01 PROCEDURE — 76705 ECHO EXAM OF ABDOMEN: CPT | Mod: 26,,, | Performed by: RADIOLOGY

## 2021-12-01 PROCEDURE — 99999 PR PBB SHADOW E&M-EST. PATIENT-LVL III: ICD-10-PCS | Mod: PBBFAC,,, | Performed by: INTERNAL MEDICINE

## 2021-12-01 PROCEDURE — 99999 PR PBB SHADOW E&M-EST. PATIENT-LVL III: CPT | Mod: PBBFAC,,, | Performed by: INTERNAL MEDICINE

## 2021-12-01 PROCEDURE — 99396 PR PREVENTIVE VISIT,EST,40-64: ICD-10-PCS | Mod: S$GLB,,, | Performed by: INTERNAL MEDICINE

## 2021-12-13 ENCOUNTER — PATIENT MESSAGE (OUTPATIENT)
Dept: INTERNAL MEDICINE | Facility: CLINIC | Age: 46
End: 2021-12-13
Payer: COMMERCIAL

## 2022-01-24 ENCOUNTER — PATIENT MESSAGE (OUTPATIENT)
Dept: OBSTETRICS AND GYNECOLOGY | Facility: CLINIC | Age: 47
End: 2022-01-24
Payer: COMMERCIAL

## 2022-01-25 ENCOUNTER — PATIENT OUTREACH (OUTPATIENT)
Dept: ADMINISTRATIVE | Facility: OTHER | Age: 47
End: 2022-01-25
Payer: COMMERCIAL

## 2022-01-27 ENCOUNTER — OFFICE VISIT (OUTPATIENT)
Dept: OBSTETRICS AND GYNECOLOGY | Facility: CLINIC | Age: 47
End: 2022-01-27
Payer: COMMERCIAL

## 2022-01-27 VITALS
DIASTOLIC BLOOD PRESSURE: 78 MMHG | SYSTOLIC BLOOD PRESSURE: 118 MMHG | BODY MASS INDEX: 33.71 KG/M2 | WEIGHT: 178.56 LBS | HEIGHT: 61 IN

## 2022-01-27 DIAGNOSIS — R22.32 MASS OF LEFT AXILLA: Primary | ICD-10-CM

## 2022-01-27 PROCEDURE — 99212 PR OFFICE/OUTPT VISIT, EST, LEVL II, 10-19 MIN: ICD-10-PCS | Mod: S$GLB,,, | Performed by: OBSTETRICS & GYNECOLOGY

## 2022-01-27 PROCEDURE — 99999 PR PBB SHADOW E&M-EST. PATIENT-LVL III: CPT | Mod: PBBFAC,,, | Performed by: OBSTETRICS & GYNECOLOGY

## 2022-01-27 PROCEDURE — 99999 PR PBB SHADOW E&M-EST. PATIENT-LVL III: ICD-10-PCS | Mod: PBBFAC,,, | Performed by: OBSTETRICS & GYNECOLOGY

## 2022-01-27 PROCEDURE — 99212 OFFICE O/P EST SF 10 MIN: CPT | Mod: S$GLB,,, | Performed by: OBSTETRICS & GYNECOLOGY

## 2022-01-27 NOTE — PROGRESS NOTES
CC:  Lump in left arm pit    HPI:   Liliana Jensen a 46 y.o.   patient who presents today complaint of a left armpit mass.  Patient reports that this mass was noted approximately 6 weeks ago.  Patient denies tenderness or drainage from the area.  Patient has not noted any enlargement of the mass/lump.  Patient would like for breast evaluation.  Patient screening mammogram was performed 2021 (benign findings).  No other complaints today.    Patient's last menstrual period was 2022.    Past Medical History:   Diagnosis Date    Abnormal Pap smear     GEGE I on bx     Cervical high risk HPV (human papillomavirus) test positive     PCO (polycystic ovaries) 3/13/2014    Took Metformin during first two pregnancies, stopped before conception this pregnancy and does not want to take     Previous  section 3/13/2014    X 2, induction and FTP, 2nd preg PROM at 37wk with repeat     Rh negative status during pregnancy 3/14/2014    Rhogam at 28wk     Vegetarian 2017       Past Surgical History:   Procedure Laterality Date     SECTION      X 2    CHOLECYSTECTOMY      DILATION AND CURETTAGE OF UTERUS  2014    missed AB     OVARIAN CYST REMOVAL      with c/s          ROS:  GENERAL: Feeling well overall.   SKIN: Denies rash or lesions.   HEAD: Denies head injury or headache.   NODES: Denies enlarged lymph nodes.   CHEST: Denies chest pain or shortness of breath.   CARDIOVASCULAR: Denies palpitations or left sided chest pain.   ABDOMEN: No abdominal pain, nausea, vomiting or rectal bleeding.   URINARY: No dysuria, hematuria, or burning on urination.  REPRODUCTIVE: See HPI.   BREASTS: Denies pain, lumps, or nipple discharge.   HEMATOLOGIC: No easy bruisability or excessive bleeding.   MUSCULOSKELETAL: Denies joint pain or swelling.   NEUROLOGIC: Denies syncope or weakness.   PSYCHIATRIC: Denies depression.    PE:   Axilla:  Left 1 cm mass noted.  Rubbery consistency palpated.   No tenderness noted.  No other mass or abnormality noted on left.  Right axilla benign to palpation/no mass noted.  Breast:  Bilateral fibrocystic changes noted.  No skin changes noted.  No nipple discharge or skin retraction.  Benign bilateral breast exam.    Diagnosis:  1. Mass of left axilla      PLAN:    Orders Placed This Encounter    US Soft Tissue Axilla, Left       Patient was counseled today on exam findings.  I am recommending a left axilla sonogram.  Patient instructed to monitor the area if any enlargement noted, patient is instructed to contact the office.  Patient verbalized understanding of this discussion.    Patient screening mammogram is due in March 2022. Patient verbalized understanding of the importance of compliance.    Will await sonogram findings to make further recommendations.    Follow-up:  PRN as above    Ronaldo Jade IV, MD

## 2022-02-05 ENCOUNTER — HOSPITAL ENCOUNTER (OUTPATIENT)
Dept: RADIOLOGY | Facility: OTHER | Age: 47
Discharge: HOME | End: 2022-02-05
Attending: OBSTETRICS & GYNECOLOGY
Payer: COMMERCIAL

## 2022-02-05 DIAGNOSIS — R22.32 MASS OF LEFT AXILLA: ICD-10-CM

## 2022-02-05 PROCEDURE — 76882 US LMTD JT/FCL EVL NVASC XTR: CPT | Mod: TC,LT

## 2022-02-05 PROCEDURE — 76882 US LMTD JT/FCL EVL NVASC XTR: CPT | Mod: 26,LT,, | Performed by: RADIOLOGY

## 2022-02-05 PROCEDURE — 76882 US SOFT TISSUE AXILLA, LEFT: ICD-10-PCS | Mod: 26,LT,, | Performed by: RADIOLOGY

## 2022-04-12 ENCOUNTER — HOSPITAL ENCOUNTER (OUTPATIENT)
Dept: RADIOLOGY | Facility: HOSPITAL | Age: 47
Discharge: HOME | End: 2022-04-12
Attending: OBSTETRICS & GYNECOLOGY
Payer: COMMERCIAL

## 2022-04-12 DIAGNOSIS — Z12.31 SCREENING MAMMOGRAM, ENCOUNTER FOR: ICD-10-CM

## 2022-04-12 PROCEDURE — 77063 BREAST TOMOSYNTHESIS BI: CPT | Mod: 26,,, | Performed by: RADIOLOGY

## 2022-04-12 PROCEDURE — 77067 MAMMO DIGITAL SCREENING BILAT WITH TOMO: ICD-10-PCS | Mod: 26,,, | Performed by: RADIOLOGY

## 2022-04-12 PROCEDURE — 77067 SCR MAMMO BI INCL CAD: CPT | Mod: TC

## 2022-04-12 PROCEDURE — 77063 MAMMO DIGITAL SCREENING BILAT WITH TOMO: ICD-10-PCS | Mod: 26,,, | Performed by: RADIOLOGY

## 2022-04-12 PROCEDURE — 77067 SCR MAMMO BI INCL CAD: CPT | Mod: 26,,, | Performed by: RADIOLOGY

## 2022-04-23 ENCOUNTER — PATIENT MESSAGE (OUTPATIENT)
Dept: OBSTETRICS AND GYNECOLOGY | Facility: CLINIC | Age: 47
End: 2022-04-23
Payer: COMMERCIAL

## 2022-05-02 ENCOUNTER — PATIENT OUTREACH (OUTPATIENT)
Dept: ADMINISTRATIVE | Facility: OTHER | Age: 47
End: 2022-05-02
Payer: COMMERCIAL

## 2022-05-02 DIAGNOSIS — Z12.11 ENCOUNTER FOR FIT (FECAL IMMUNOCHEMICAL TEST) SCREENING: Primary | ICD-10-CM

## 2022-05-02 NOTE — PROGRESS NOTES
Care Everywhere:   Immunization:   Health Maintenance: updated  Media Review:   Legacy Review:   DIS:  Order placed: fit kit   Upcoming appts:  EFAX:  Task Tickets:  Referrals:

## 2022-05-06 ENCOUNTER — PATIENT MESSAGE (OUTPATIENT)
Dept: INTERNAL MEDICINE | Facility: CLINIC | Age: 47
End: 2022-05-06
Payer: COMMERCIAL

## 2022-05-30 ENCOUNTER — PATIENT MESSAGE (OUTPATIENT)
Dept: ADMINISTRATIVE | Facility: HOSPITAL | Age: 47
End: 2022-05-30
Payer: COMMERCIAL

## 2022-09-22 ENCOUNTER — OFFICE VISIT (OUTPATIENT)
Dept: OBSTETRICS AND GYNECOLOGY | Facility: CLINIC | Age: 47
End: 2022-09-22
Payer: COMMERCIAL

## 2022-09-22 VITALS
BODY MASS INDEX: 32.55 KG/M2 | SYSTOLIC BLOOD PRESSURE: 110 MMHG | HEIGHT: 61 IN | WEIGHT: 172.38 LBS | DIASTOLIC BLOOD PRESSURE: 78 MMHG

## 2022-09-22 DIAGNOSIS — N92.6 MISSED MENSES: ICD-10-CM

## 2022-09-22 DIAGNOSIS — Z12.31 VISIT FOR SCREENING MAMMOGRAM: ICD-10-CM

## 2022-09-22 DIAGNOSIS — Z32.02 NEGATIVE PREGNANCY TEST: ICD-10-CM

## 2022-09-22 DIAGNOSIS — Z01.419 ENCOUNTER FOR GYNECOLOGICAL EXAMINATION WITHOUT ABNORMAL FINDING: Primary | ICD-10-CM

## 2022-09-22 LAB
B-HCG UR QL: NEGATIVE
CTP QC/QA: YES

## 2022-09-22 PROCEDURE — 81025 URINE PREGNANCY TEST: CPT | Mod: S$GLB,,, | Performed by: OBSTETRICS & GYNECOLOGY

## 2022-09-22 PROCEDURE — 99999 PR PBB SHADOW E&M-EST. PATIENT-LVL III: ICD-10-PCS | Mod: PBBFAC,,, | Performed by: OBSTETRICS & GYNECOLOGY

## 2022-09-22 PROCEDURE — 99396 PR PREVENTIVE VISIT,EST,40-64: ICD-10-PCS | Mod: 25,S$GLB,, | Performed by: OBSTETRICS & GYNECOLOGY

## 2022-09-22 PROCEDURE — 99999 PR PBB SHADOW E&M-EST. PATIENT-LVL III: CPT | Mod: PBBFAC,,, | Performed by: OBSTETRICS & GYNECOLOGY

## 2022-09-22 PROCEDURE — 99396 PREV VISIT EST AGE 40-64: CPT | Mod: 25,S$GLB,, | Performed by: OBSTETRICS & GYNECOLOGY

## 2022-09-22 PROCEDURE — 81025 POCT URINE PREGNANCY: ICD-10-PCS | Mod: S$GLB,,, | Performed by: OBSTETRICS & GYNECOLOGY

## 2022-09-22 NOTE — PROGRESS NOTES
Well woman exam    Liliana Wright is a 47 y.o.   patient who presents for an annual well woman exam.  LMP: Patient's last menstrual period was 2022 (exact date).  Patient reports missed menses x1 this past year (last month).  No other gynecologic issues, problems, or complaints.      UPT:  Negative    Past Medical History:   Diagnosis Date    Abnormal Pap smear     GEGE I on bx     Cervical high risk HPV (human papillomavirus) test positive     PCO (polycystic ovaries) 3/13/2014    Took Metformin during first two pregnancies, stopped before conception this pregnancy and does not want to take     Previous  section 3/13/2014    X 2, induction and FTP, 2nd preg PROM at 37wk with repeat     Rh negative status during pregnancy 3/14/2014    Rhogam at 28wk     Vegetarian 2017     Past Surgical History:   Procedure Laterality Date     SECTION      X 2    CHOLECYSTECTOMY      DILATION AND CURETTAGE OF UTERUS  2014    missed AB     OVARIAN CYST REMOVAL      with c/s      Social History     Socioeconomic History    Marital status:    Occupational History     Employer: [employer]   Tobacco Use    Smoking status: Never    Smokeless tobacco: Never   Substance and Sexual Activity    Alcohol use: No    Drug use: No    Sexual activity: Yes     Partners: Male     Birth control/protection: None     Comment:  to [facility]    Other Topics Concern    Are you pregnant or think you may be? No    Breast-feeding No   Social History Narrative    From [place]    Moved to [facility] in      Family History   Problem Relation Age of Onset    Lupus Father     Hypertension Father     No Known Problems Mother     Breast cancer Maternal Grandmother 70    Cancer Maternal Grandmother     No Known Problems Sister     No Known Problems Brother     No Known Problems Daughter     No Known Problems Son     Alopecia Neg Hx     Diabetes Neg Hx     Hypertension Neg Hx     Heart disease Neg Hx     Stroke Neg  "Hx     Acne Neg Hx     Eczema Neg Hx     Psoriasis Neg Hx     Melanoma Neg Hx     Ovarian cancer Neg Hx     Colon cancer Neg Hx      OB History          4    Para   2    Term   1       1    AB   2    Living   2         SAB   2    IAB        Ectopic        Multiple        Live Births   2                 /78   Ht 5' 1" (1.549 m)   Wt 78.2 kg (172 lb 6.4 oz)   LMP 2022 (Exact Date)   BMI 32.57 kg/m²       ROS:  GENERAL: Denies weight gain or weight loss. Feeling well overall.   SKIN: Denies rash or lesions.   HEAD: Denies head injury or headache.   NODES: Denies enlarged lymph nodes.   CHEST: Denies chest pain or shortness of breath.   CARDIOVASCULAR: Denies palpitations or left sided chest pain.   ABDOMEN: No abdominal pain, constipation, diarrhea, nausea, vomiting or rectal bleeding.   URINARY: No frequency, dysuria, hematuria, or burning on urination.  REPRODUCTIVE: See HPI.   BREASTS: The patient performs breast self-examination and denies pain, lumps, or nipple discharge.   HEMATOLOGIC: No easy bruisability or excessive bleeding.   MUSCULOSKELETAL: Denies joint pain or swelling.   NEUROLOGIC: Denies syncope or weakness.   PSYCHIATRIC: Denies depression, anxiety or mood swings.    PHYSICAL EXAM:  APPEARANCE: Well nourished, well developed, in no acute distress.  AFFECT: WNL, alert and oriented x 3  SKIN: No acne or hirsutism  NECK: Neck symmetric without masses or thyromegaly  NODES: No inguinal, cervical, axillary, or femoral lymph node enlargement  CHEST: Good respiratory effect  ABDOMEN: Soft.  No tenderness or masses.  No hepatosplenomegaly.  No hernias.  Healed Pfannenstiel skin incision noted.  BREASTS: Symmetrical, no skin changes or visible lesions.  No palpable masses, nipple discharge bilaterally.  PELVIC: Normal external genitalia without lesions.  Normal hair distribution.  Adequate perineal body, normal urethral meatus.  Vagina moist and well rugated without lesions or " discharge.  Cervix pink, without lesions, discharge or tenderness.  No significant cystocele or rectocele.  Bimanual exam shows uterus to be to 8-10 weeks size, anteverted, mobile and nontender.  Adnexa without masses or tenderness.    EXTREMITIES: No edema.    Encounter for gynecological examination without abnormal finding    Missed menses  -     POCT urine pregnancy    Visit for screening mammogram  -     Mammo Digital Screening Bilat w/ Alexi; Future; Expected date: 09/22/2022    Negative pregnancy test  -     POCT urine pregnancy    Age specific counseling    Pap smear not done/not indicated    Cancer screening recommendations reviewed with patient today.  Patient considering colonoscopy versus Cologuard.  Patient will contact office with desired screening regimen.    Screening mammogram ordered as above.    Patient was counseled today on A.C.S. Pap guidelines and recommendations for yearly pelvic exams, mammograms and monthly self breast exams; to see her PCP for other health maintenance.     Follow up in about 1 year (around 9/22/2023) for Annual exam.    Ronaldo Jade IV, MD

## 2022-11-09 ENCOUNTER — PATIENT MESSAGE (OUTPATIENT)
Dept: INTERNAL MEDICINE | Facility: CLINIC | Age: 47
End: 2022-11-09
Payer: COMMERCIAL

## 2022-11-09 DIAGNOSIS — R70.0 ELEVATED SED RATE: ICD-10-CM

## 2022-11-09 DIAGNOSIS — Z00.00 ANNUAL PHYSICAL EXAM: Primary | ICD-10-CM

## 2022-11-21 ENCOUNTER — PATIENT MESSAGE (OUTPATIENT)
Dept: ADMINISTRATIVE | Facility: HOSPITAL | Age: 47
End: 2022-11-21
Payer: COMMERCIAL

## 2022-12-19 ENCOUNTER — PATIENT MESSAGE (OUTPATIENT)
Dept: INTERNAL MEDICINE | Facility: CLINIC | Age: 47
End: 2022-12-19
Payer: COMMERCIAL

## 2022-12-21 ENCOUNTER — LAB VISIT (OUTPATIENT)
Dept: LAB | Facility: HOSPITAL | Age: 47
End: 2022-12-21
Attending: INTERNAL MEDICINE
Payer: COMMERCIAL

## 2022-12-21 ENCOUNTER — PATIENT MESSAGE (OUTPATIENT)
Dept: INTERNAL MEDICINE | Facility: CLINIC | Age: 47
End: 2022-12-21
Payer: COMMERCIAL

## 2022-12-21 DIAGNOSIS — Z00.00 ANNUAL PHYSICAL EXAM: ICD-10-CM

## 2022-12-21 DIAGNOSIS — R74.01 TRANSAMINITIS: Primary | ICD-10-CM

## 2022-12-21 DIAGNOSIS — R70.0 ELEVATED SED RATE: ICD-10-CM

## 2022-12-21 LAB
ALBUMIN SERPL BCP-MCNC: 4.1 G/DL (ref 3.5–5.2)
ALP SERPL-CCNC: 75 U/L (ref 55–135)
ALT SERPL W/O P-5'-P-CCNC: 62 U/L (ref 10–44)
ANION GAP SERPL CALC-SCNC: 7 MMOL/L (ref 8–16)
AST SERPL-CCNC: 38 U/L (ref 10–40)
BASOPHILS # BLD AUTO: 0.03 K/UL (ref 0–0.2)
BASOPHILS NFR BLD: 0.6 % (ref 0–1.9)
BILIRUB SERPL-MCNC: 0.3 MG/DL (ref 0.1–1)
BUN SERPL-MCNC: 7 MG/DL (ref 6–20)
CALCIUM SERPL-MCNC: 9.3 MG/DL (ref 8.7–10.5)
CHLORIDE SERPL-SCNC: 107 MMOL/L (ref 95–110)
CHOLEST SERPL-MCNC: 191 MG/DL (ref 120–199)
CHOLEST/HDLC SERPL: 5.2 {RATIO} (ref 2–5)
CO2 SERPL-SCNC: 25 MMOL/L (ref 23–29)
CREAT SERPL-MCNC: 0.8 MG/DL (ref 0.5–1.4)
DIFFERENTIAL METHOD: ABNORMAL
EOSINOPHIL # BLD AUTO: 0.1 K/UL (ref 0–0.5)
EOSINOPHIL NFR BLD: 1.5 % (ref 0–8)
ERYTHROCYTE [DISTWIDTH] IN BLOOD BY AUTOMATED COUNT: 13.9 % (ref 11.5–14.5)
ERYTHROCYTE [SEDIMENTATION RATE] IN BLOOD BY WESTERGREN METHOD: 35 MM/HR (ref 0–20)
EST. GFR  (NO RACE VARIABLE): >60 ML/MIN/1.73 M^2
GLUCOSE SERPL-MCNC: 93 MG/DL (ref 70–110)
HCT VFR BLD AUTO: 38.1 % (ref 37–48.5)
HDLC SERPL-MCNC: 37 MG/DL (ref 40–75)
HDLC SERPL: 19.4 % (ref 20–50)
HGB BLD-MCNC: 12.4 G/DL (ref 12–16)
IMM GRANULOCYTES # BLD AUTO: 0 K/UL (ref 0–0.04)
IMM GRANULOCYTES NFR BLD AUTO: 0 % (ref 0–0.5)
LDLC SERPL CALC-MCNC: 133.6 MG/DL (ref 63–159)
LYMPHOCYTES # BLD AUTO: 1.7 K/UL (ref 1–4.8)
LYMPHOCYTES NFR BLD: 32.6 % (ref 18–48)
MCH RBC QN AUTO: 27.1 PG (ref 27–31)
MCHC RBC AUTO-ENTMCNC: 32.5 G/DL (ref 32–36)
MCV RBC AUTO: 83 FL (ref 82–98)
MONOCYTES # BLD AUTO: 0.2 K/UL (ref 0.3–1)
MONOCYTES NFR BLD: 3.2 % (ref 4–15)
NEUTROPHILS # BLD AUTO: 3.3 K/UL (ref 1.8–7.7)
NEUTROPHILS NFR BLD: 62.1 % (ref 38–73)
NONHDLC SERPL-MCNC: 154 MG/DL
NRBC BLD-RTO: 0 /100 WBC
PLATELET # BLD AUTO: 258 K/UL (ref 150–450)
PMV BLD AUTO: 10.4 FL (ref 9.2–12.9)
POTASSIUM SERPL-SCNC: 3.9 MMOL/L (ref 3.5–5.1)
PROT SERPL-MCNC: 7.9 G/DL (ref 6–8.4)
RBC # BLD AUTO: 4.57 M/UL (ref 4–5.4)
SODIUM SERPL-SCNC: 139 MMOL/L (ref 136–145)
TRIGL SERPL-MCNC: 102 MG/DL (ref 30–150)
TSH SERPL DL<=0.005 MIU/L-ACNC: 0.68 UIU/ML (ref 0.4–4)
WBC # BLD AUTO: 5.28 K/UL (ref 3.9–12.7)

## 2022-12-21 PROCEDURE — 85652 RBC SED RATE AUTOMATED: CPT | Performed by: INTERNAL MEDICINE

## 2022-12-21 PROCEDURE — 80053 COMPREHEN METABOLIC PANEL: CPT | Performed by: INTERNAL MEDICINE

## 2022-12-21 PROCEDURE — 84443 ASSAY THYROID STIM HORMONE: CPT | Performed by: INTERNAL MEDICINE

## 2022-12-21 PROCEDURE — 85025 COMPLETE CBC W/AUTO DIFF WBC: CPT | Performed by: INTERNAL MEDICINE

## 2022-12-21 PROCEDURE — 36415 COLL VENOUS BLD VENIPUNCTURE: CPT | Performed by: INTERNAL MEDICINE

## 2022-12-21 PROCEDURE — 80061 LIPID PANEL: CPT | Performed by: INTERNAL MEDICINE

## 2022-12-22 ENCOUNTER — TELEPHONE (OUTPATIENT)
Dept: INTERNAL MEDICINE | Facility: CLINIC | Age: 47
End: 2022-12-22
Payer: COMMERCIAL

## 2022-12-22 NOTE — TELEPHONE ENCOUNTER
----- Message from Jenna Scott MD sent at 12/21/2022  5:40 PM CST -----  Message sent to pt via my chart with results and updates to plan.     Please arrange lfts in  week

## 2022-12-23 ENCOUNTER — PATIENT MESSAGE (OUTPATIENT)
Dept: INTERNAL MEDICINE | Facility: CLINIC | Age: 47
End: 2022-12-23
Payer: COMMERCIAL

## 2022-12-28 ENCOUNTER — OFFICE VISIT (OUTPATIENT)
Dept: INTERNAL MEDICINE | Facility: CLINIC | Age: 47
End: 2022-12-28
Attending: INTERNAL MEDICINE
Payer: COMMERCIAL

## 2022-12-28 ENCOUNTER — LAB VISIT (OUTPATIENT)
Dept: LAB | Facility: HOSPITAL | Age: 47
End: 2022-12-28
Attending: INTERNAL MEDICINE
Payer: COMMERCIAL

## 2022-12-28 VITALS
SYSTOLIC BLOOD PRESSURE: 108 MMHG | DIASTOLIC BLOOD PRESSURE: 84 MMHG | HEIGHT: 61 IN | BODY MASS INDEX: 32.22 KG/M2 | HEART RATE: 78 BPM | OXYGEN SATURATION: 98 % | WEIGHT: 170.63 LBS

## 2022-12-28 DIAGNOSIS — E66.9 OBESITY (BMI 30.0-34.9): ICD-10-CM

## 2022-12-28 DIAGNOSIS — F41.9 ANXIETY: ICD-10-CM

## 2022-12-28 DIAGNOSIS — E55.9 VITAMIN D DEFICIENCY: ICD-10-CM

## 2022-12-28 DIAGNOSIS — Z00.00 ANNUAL PHYSICAL EXAM: Primary | ICD-10-CM

## 2022-12-28 DIAGNOSIS — R74.01 TRANSAMINITIS: ICD-10-CM

## 2022-12-28 LAB
ALBUMIN SERPL BCP-MCNC: 4.1 G/DL (ref 3.5–5.2)
ALP SERPL-CCNC: 69 U/L (ref 55–135)
ALT SERPL W/O P-5'-P-CCNC: 42 U/L (ref 10–44)
AST SERPL-CCNC: 21 U/L (ref 10–40)
BILIRUB DIRECT SERPL-MCNC: 0.2 MG/DL (ref 0.1–0.3)
BILIRUB SERPL-MCNC: 0.4 MG/DL (ref 0.1–1)
PROT SERPL-MCNC: 7.6 G/DL (ref 6–8.4)

## 2022-12-28 PROCEDURE — 80076 HEPATIC FUNCTION PANEL: CPT | Performed by: INTERNAL MEDICINE

## 2022-12-28 PROCEDURE — 99396 PR PREVENTIVE VISIT,EST,40-64: ICD-10-PCS | Mod: S$GLB,,, | Performed by: INTERNAL MEDICINE

## 2022-12-28 PROCEDURE — 99396 PREV VISIT EST AGE 40-64: CPT | Mod: S$GLB,,, | Performed by: INTERNAL MEDICINE

## 2022-12-28 PROCEDURE — 36415 COLL VENOUS BLD VENIPUNCTURE: CPT | Performed by: INTERNAL MEDICINE

## 2022-12-28 PROCEDURE — 99999 PR PBB SHADOW E&M-EST. PATIENT-LVL III: CPT | Mod: PBBFAC,,, | Performed by: INTERNAL MEDICINE

## 2022-12-28 PROCEDURE — 99999 PR PBB SHADOW E&M-EST. PATIENT-LVL III: ICD-10-PCS | Mod: PBBFAC,,, | Performed by: INTERNAL MEDICINE

## 2022-12-28 NOTE — PROGRESS NOTES
"Subjective:   Patient ID: Liliana Wright is a 47 y.o. female  Chief complaint:   Chief Complaint   Patient presents with    Annual Exam       HPI    Here for annual    finished his masters   She just received new job offer!  Completed courses and to sit for LSAT next year - in future applying for [employer] school     Reviewed labs with her today during appt    Obesity:   Lost about 8 pounds over past year with lifestyle changes  Hydrating   Taking fiber suppl in am   Cooking most of her own food   Inc walking  Fasting at times    Transaminitis:   Resolved on recent labs   Had a couple of episodes of loose stools at time of labs 12/21/2022    Vit d def:  - taking otc vit d daily     RICHY:   - No heavy periods - not taking oral iron - stable on prior labs and anemia resolved     At [facility] reported episode of hand tumbness when sleeping  - only one occurrence since [facility]   Previously:   - will wake her up  - no neck pain   - discussed further eval of hand numbness and pt would like conservative mgmt at this time     Morphea:   followed by derm     HM:  Flu vaccine   Covid booster   cscope    Gyn: Yasmany  Derm: juan    Review of Systems    Objective:  Vitals:    12/28/22 1001   BP: 108/84   BP Location: Left arm   Patient Position: Sitting   Pulse: 78   SpO2: 98%   Weight: 77.4 kg (170 lb 10.2 oz)   Height: 5' 1" (1.549 m)     Body mass index is 32.24 kg/m².    Physical Exam  Vitals reviewed.   Constitutional:       Appearance: Normal appearance. She is well-developed.   HENT:      Head: Normocephalic and atraumatic.      Right Ear: Tympanic membrane, ear canal and external ear normal.      Left Ear: Tympanic membrane, ear canal and external ear normal.      Nose: Nose normal. No congestion.      Mouth/Throat:      Mouth: Mucous membranes are moist.      Pharynx: Oropharynx is clear. No oropharyngeal exudate.   Eyes:      Extraocular Movements: Extraocular movements intact.      Conjunctiva/sclera: Conjunctivae normal. "   Neck:      Thyroid: No thyromegaly.   Cardiovascular:      Rate and Rhythm: Normal rate and regular rhythm.      Pulses: Normal pulses.      Heart sounds: Normal heart sounds.   Pulmonary:      Effort: Pulmonary effort is normal.      Breath sounds: Normal breath sounds.   Abdominal:      General: Bowel sounds are normal.      Palpations: Abdomen is soft.   Musculoskeletal:         General: No swelling or tenderness.      Cervical back: Neck supple.   Lymphadenopathy:      Cervical: No cervical adenopathy.   Skin:     General: Skin is warm and dry.      Capillary Refill: Capillary refill takes less than 2 seconds.   Neurological:      General: No focal deficit present.      Mental Status: She is alert and oriented to person, place, and time. Mental status is at baseline.   Psychiatric:         Behavior: Behavior normal.         Thought Content: Thought content normal.     Assessment:  1. Annual physical exam    2. Obesity (BMI 30.0-34.9)    3. Anxiety    4. Vitamin D deficiency          Plan:  Liliana was seen today for annual exam.    Diagnoses and all orders for this visit:    Annual physical exam  Recommend daily sunscreen, cardiovascular exercise min 30 min 5 days per week. Seatbelts routinely.    Obesity (BMI 30.0-34.9)  - cont diet and exercise  - increase intensity and duration of CV exercise to continue weight loss  - goal wt loss one pound per week  - portion control, healthy choices    Anxiety  Improved   Cont lifestyle changes     Vitamin D deficiency  Stable     F/u with derm and gyn as planned     Health Maintenance   Topic Date Due    Mammogram  04/12/2023    Lipid Panel  12/21/2027    TETANUS VACCINE  03/01/2029    Hepatitis C Screening  Completed

## 2023-01-09 NOTE — LETTER
February 8, 2018      Ronaldo Jade III, MD  [address]  [address]  [place] [address]           [facility]  [address] [address]  Phone: [phone]  Fax: [phone]          Patient: Liliana Wright   MR Number: [id number]   YOB: 1975   Date of Visit: 2/8/2018       Dear Dr. Ronaldo Jade III:    Thank you for referring Liliana Wright to me for evaluation. Attached you will find relevant portions of my assessment and plan of care.    If you have questions, please do not hesitate to call me. I look forward to following Liliana Wright along with you.    Sincerely,    TRINY Archer    Enclosure  CC:  No Recipients    If you would like to receive this communication electronically, please contact [email or web address] or [phone] to request more information on [employer] access.    For providers and/or their staff who would like to refer a patient to [facility], please contact us through our one-stop-shop provider referral line, [facility], at [phone].    If you feel you have received this communication in error or would no longer like to receive these types of communications, please e-mail [email or web address]          Group Topic: BH Coping Skills Education    Date: 1/9/2023  Start Time:  2:15 PM  End Time:  3:05 PM  Facilitators: ADDI Felix    Focus: Adaptive Behavior Skills - TRAP TRAC Models and Goals  Number in attendance: 7    A discussion was facilitated on what the TRAP and TRAC models are, and an example of each was gone over in group.  Patients were encouraged to think about their personal TRAPs (Trigger, emotional Response, Avoidance Patterns) and TRACs (Trigger, emotional Response, Active Coping); think about what routine, enjoyable, and valued activities they can engage in for active coping; and to think about what activities they tend to decrease or avoid when they are feeling depressed.    Patients were asked to set short-term goals related to self-care and treatment.    Patient set appropriate goals to eat dinner, to shower, to shave his head, and to do a sleep log.    Method: Group and Handout  Attendance: Present  Participation: Minimal  Patient Response: Indifferent and Quiet  Mood: Depressed  Affect: Type: Depressed   Range: Blunted/flat   Congruency: Congruent   Stability: Stable  Behavior/Socialization: Indifferent  Thought Process: Focused  Task Performance: Follows directions  Patient Evaluation: Encouragement - needs prompts

## 2023-01-10 ENCOUNTER — PATIENT MESSAGE (OUTPATIENT)
Dept: DERMATOLOGY | Facility: CLINIC | Age: 48
End: 2023-01-10
Payer: COMMERCIAL

## 2023-03-17 ENCOUNTER — E-VISIT (OUTPATIENT)
Dept: INTERNAL MEDICINE | Facility: CLINIC | Age: 48
End: 2023-03-17
Attending: INTERNAL MEDICINE
Payer: COMMERCIAL

## 2023-03-17 ENCOUNTER — PATIENT MESSAGE (OUTPATIENT)
Dept: INTERNAL MEDICINE | Facility: CLINIC | Age: 48
End: 2023-03-17

## 2023-03-17 DIAGNOSIS — H93.A9 PULSATILE TINNITUS: Primary | ICD-10-CM

## 2023-03-17 PROCEDURE — 99499 NO LOS: ICD-10-PCS | Mod: 95,,, | Performed by: INTERNAL MEDICINE

## 2023-03-17 PROCEDURE — 99499 UNLISTED E&M SERVICE: CPT | Mod: 95,,, | Performed by: INTERNAL MEDICINE

## 2023-03-17 NOTE — TELEPHONE ENCOUNTER
Symptoms may be due to her recent infection.   I recommend an appt to appropriately assess her symptoms - please help arrange with a provider     For now, can take:  Over the counter anti-histamine like zyrtec daily along with twice daily nasal saline rinses (oceans nasal saline 3-4 sprays per nostril twice daily) and gently blow nose followed by flonase 2 sprays nightly daily for 2-4 weeks to look for improvement. Flonase takes about 1 week to start working.

## 2023-03-26 ENCOUNTER — PATIENT MESSAGE (OUTPATIENT)
Dept: INTERNAL MEDICINE | Facility: CLINIC | Age: 48
End: 2023-03-26
Payer: COMMERCIAL

## 2023-03-26 DIAGNOSIS — H66.90 EAR INFECTION: Primary | ICD-10-CM

## 2023-03-27 ENCOUNTER — OFFICE VISIT (OUTPATIENT)
Dept: OTOLARYNGOLOGY | Facility: CLINIC | Age: 48
End: 2023-03-27
Payer: COMMERCIAL

## 2023-03-27 ENCOUNTER — TELEPHONE (OUTPATIENT)
Dept: OTOLARYNGOLOGY | Facility: CLINIC | Age: 48
End: 2023-03-27
Payer: COMMERCIAL

## 2023-03-27 VITALS
TEMPERATURE: 98 F | SYSTOLIC BLOOD PRESSURE: 129 MMHG | BODY MASS INDEX: 33.07 KG/M2 | HEART RATE: 89 BPM | WEIGHT: 175.06 LBS | DIASTOLIC BLOOD PRESSURE: 76 MMHG

## 2023-03-27 DIAGNOSIS — R43.9 SMELL OR TASTE SENSATION DISTURBANCE: ICD-10-CM

## 2023-03-27 DIAGNOSIS — H66.90 EAR INFECTION: ICD-10-CM

## 2023-03-27 DIAGNOSIS — J34.2 NASAL SEPTAL DEVIATION: ICD-10-CM

## 2023-03-27 DIAGNOSIS — H93.A1 PULSATILE TINNITUS OF RIGHT EAR: Primary | ICD-10-CM

## 2023-03-27 DIAGNOSIS — H69.91 DYSFUNCTION OF RIGHT EUSTACHIAN TUBE: ICD-10-CM

## 2023-03-27 PROCEDURE — 99999 PR PBB SHADOW E&M-EST. PATIENT-LVL IV: ICD-10-PCS | Mod: PBBFAC,,, | Performed by: SPECIALIST

## 2023-03-27 PROCEDURE — 99999 PR PBB SHADOW E&M-EST. PATIENT-LVL IV: CPT | Mod: PBBFAC,,, | Performed by: SPECIALIST

## 2023-03-27 PROCEDURE — 99204 PR OFFICE/OUTPT VISIT, NEW, LEVL IV, 45-59 MIN: ICD-10-PCS | Mod: S$GLB,,, | Performed by: SPECIALIST

## 2023-03-27 PROCEDURE — 99204 OFFICE O/P NEW MOD 45 MIN: CPT | Mod: S$GLB,,, | Performed by: SPECIALIST

## 2023-03-27 RX ORDER — FLUTICASONE PROPIONATE 50 MCG
2 SPRAY, SUSPENSION (ML) NASAL DAILY
Qty: 18.2 ML | Refills: 11 | Status: SHIPPED | OUTPATIENT
Start: 2023-03-27 | End: 2024-01-12

## 2023-03-27 NOTE — PROGRESS NOTES
Subjective:       Patient ID: Liliana Wright is a 47 y.o. female.    Chief Complaint: No chief complaint on file.    The patient is referred by Dr. Jenna Scott for evaluation of ear pain and pulsatile tinnitus.  Three weeks ago she developed an upper respiratory infection which left her with a sore throat.  That progressed to pain and pulsatile tinnitus in her right ear.  It was sufficiently severe that she went to an urgent care week ago where she was treated with antibiotics and Cortisporin otic drops.  She is not having significant pain in the ear but pulsatile tinnitus continues.  The ear drops did not help to reduce the issue.  The tinnitus occurs when she changes positions in typically while she is in the seated position.  She did notice it when she would go to the bathroom to urinate.  She has no associated vertigo.        Review of Systems     Constitutional: Negative for appetite change, chills, fatigue, fever and unexpected weight loss.      HENT: Positive for ear infection, ear pain, hearing loss, postnasal drip, runny nose, sore throat and stuffy nose.  Negative for ear discharge, facial swelling, mouth sores, nosebleeds, ringing in the ears, sinus infection, sinus pressure, tonsil infection, dental problems, trouble swallowing and voice change.      Eyes:  Negative for change in eyesight, eye drainage, eye itching and photophobia.     Respiratory:  Negative for cough, shortness of breath, sleep apnea, snoring and wheezing.      Cardiovascular:  Negative for chest pain, foot swelling, irregular heartbeat and swollen veins.     Gastrointestinal:  Negative for abdominal pain, acid reflux, constipation, diarrhea, heartburn and vomiting.     Genitourinary: Negative for difficulty urinating, sexual problems and frequent urination.     Musc: Negative for aching joints, aching muscles, back pain and neck pain.     Skin: Negative for rash.     Allergy: Negative for food allergies and seasonal allergies.      Endocrine: Negative for cold intolerance and heat intolerance.      Neurological: Negative for dizziness, headaches, light-headedness, seizures and tremors.      Hematologic: Negative for bruises/bleeds easily and swollen glands.      Psychiatric: Negative for decreased concentration, depression, nervous/anxious and sleep disturbance.              Objective:      Physical Exam  Vitals and nursing note reviewed.   Constitutional:       General: She is awake.      Appearance: Normal appearance. She is well-developed, well-groomed and normal weight.   HENT:      Head: Normocephalic.      Jaw: There is normal jaw occlusion.      Salivary Glands: Right salivary gland is not diffusely enlarged or tender. Left salivary gland is not diffusely enlarged or tender.      Right Ear: Hearing, ear canal and external ear normal. Tympanic membrane is retracted. Tympanic membrane has decreased mobility.      Left Ear: Hearing, ear canal and external ear normal. Tympanic membrane is retracted. Tympanic membrane has normal mobility.      Nose: Septal deviation (to the left), mucosal edema (cyanotic, boggy inferior turbinates bilaterally) and rhinorrhea (clear mucus bilaterally) present. No nasal deformity. Rhinorrhea is clear.      Right Turbinates: Enlarged and pale.      Left Turbinates: Enlarged and pale.      Mouth/Throat:      Lips: No lesions.      Mouth: Mucous membranes are moist. No oral lesions.      Dentition: No gum lesions.      Tongue: No lesions.      Palate: No mass and lesions.      Pharynx: Oropharynx is clear. Uvula midline.   Eyes:      General: Lids are normal.         Right eye: No discharge.         Left eye: No discharge.      Conjunctiva/sclera:      Right eye: Right conjunctiva is injected. No exudate.     Left eye: Left conjunctiva is injected. No exudate.     Pupils: Pupils are equal, round, and reactive to light.   Neck:      Thyroid: No thyroid mass or thyromegaly.      Trachea: Trachea normal. No  tracheal deviation.   Cardiovascular:      Rate and Rhythm: Normal rate and regular rhythm.      Pulses: Normal pulses.      Heart sounds: Normal heart sounds.   Pulmonary:      Effort: Pulmonary effort is normal.      Breath sounds: Normal breath sounds. No stridor. No decreased breath sounds, wheezing, rhonchi or rales.   Abdominal:      General: Bowel sounds are normal.      Palpations: Abdomen is soft.      Tenderness: There is no abdominal tenderness.   Musculoskeletal:         General: Normal range of motion.      Cervical back: Normal range of motion. No muscular tenderness.   Lymphadenopathy:      Head:      Right side of head: No submental, submandibular, preauricular, posterior auricular or occipital adenopathy.      Left side of head: No submental, submandibular, preauricular, posterior auricular or occipital adenopathy.      Cervical: No cervical adenopathy.   Skin:     General: Skin is warm and dry.      Findings: No petechiae or rash.      Nails: There is no clubbing.   Neurological:      Mental Status: She is alert and oriented to person, place, and time.      Cranial Nerves: No cranial nerve deficit.      Sensory: No sensory deficit.      Gait: Gait normal.   Psychiatric:         Speech: Speech normal.         Behavior: Behavior normal. Behavior is cooperative.         Thought Content: Thought content normal.         Judgment: Judgment normal.       Assessment:       1. Pulsatile tinnitus of right ear    2. Ear infection    3. Nasal septal deviation    4. Smell or taste sensation disturbance    5. Dysfunction of right eustachian tube          Plan:       I am placing the patient on daily fluticasone spray.  I am scheduling her for a comprehensive auditory evaluation.  I will recheck her in 6 weeks, or sooner on an as-needed basis.

## 2023-03-27 NOTE — TELEPHONE ENCOUNTER
----- Message from Shiela Barrett sent at 3/27/2023  2:27 PM CDT -----  Contact: shane @ [phone]  Liliana Wright calling regarding General Inquiry for #pt is running 5mins late for appt 3/27  Patient is present for her appointment.

## 2023-03-27 NOTE — TELEPHONE ENCOUNTER
Referral to ent ordered   Please also offer her an appt with a provider with availability this week for re-evaluation if unable to be seen by ent this week

## 2023-04-03 ENCOUNTER — PATIENT MESSAGE (OUTPATIENT)
Dept: DERMATOLOGY | Facility: CLINIC | Age: 48
End: 2023-04-03

## 2023-04-03 ENCOUNTER — OFFICE VISIT (OUTPATIENT)
Dept: DERMATOLOGY | Facility: CLINIC | Age: 48
End: 2023-04-03
Payer: COMMERCIAL

## 2023-04-03 DIAGNOSIS — D17.0 LIPOMA OF SCALP: ICD-10-CM

## 2023-04-03 DIAGNOSIS — L94.0 MORPHEA: ICD-10-CM

## 2023-04-03 DIAGNOSIS — H61.111 EAR CARTILAGE DEFORMITY, RIGHT: ICD-10-CM

## 2023-04-03 DIAGNOSIS — B07.8 COMMON WART: Primary | ICD-10-CM

## 2023-04-03 PROCEDURE — 17110 DESTRUCTION B9 LES UP TO 14: CPT | Mod: S$GLB,,, | Performed by: DERMATOLOGY

## 2023-04-03 PROCEDURE — 99213 PR OFFICE/OUTPT VISIT, EST, LEVL III, 20-29 MIN: ICD-10-PCS | Mod: 25,S$GLB,, | Performed by: DERMATOLOGY

## 2023-04-03 PROCEDURE — 99213 OFFICE O/P EST LOW 20 MIN: CPT | Mod: 25,S$GLB,, | Performed by: DERMATOLOGY

## 2023-04-03 PROCEDURE — 17110 PR DESTRUCTION BENIGN LESIONS UP TO 14: ICD-10-PCS | Mod: S$GLB,,, | Performed by: DERMATOLOGY

## 2023-04-03 PROCEDURE — 99999 PR PBB SHADOW E&M-EST. PATIENT-LVL III: ICD-10-PCS | Mod: PBBFAC,,, | Performed by: DERMATOLOGY

## 2023-04-03 PROCEDURE — 99999 PR PBB SHADOW E&M-EST. PATIENT-LVL III: CPT | Mod: PBBFAC,,, | Performed by: DERMATOLOGY

## 2023-04-03 NOTE — PROGRESS NOTES
Subjective:      Patient ID:  Liliana Wright is a 48 y.o. female who presents for   Chief Complaint   Patient presents with    Follow-up     Morphea f/u    Cyst     Scalp- f/u     Pt here today for scalp cyst f/u- unchanged. No pain. No draining.   Also here for morphea f/u- abdomen. No new flares.     C/o 2 small lesions on right ear. Just noticed about 3 months ago. No symptoms    Also has lesion on left thumb. Present for months . No tx      Follow-up    Cyst    Review of Systems   Constitutional:  Negative for fever, chills, weight loss, fatigue and night sweats.   Skin:  Positive for daily sunscreen use and activity-related sunscreen use. Negative for rash and tendency to form keloidal scars.   Hematologic/Lymphatic: Does not bruise/bleed easily.     Objective:   Physical Exam   Constitutional: She appears well-developed and well-nourished. No distress.   HENT:   Mouth/Throat: Lips normal.    Eyes: Lids are normal.  No conjunctival no injection.   Neurological: She is alert and oriented to person, place, and time. She is not disoriented.   Psychiatric: She has a normal mood and affect.   Skin:   Areas Examined (abnormalities noted in diagram):   Scalp / Hair Palpated and Inspected  Neck Inspection Performed  Chest / Axilla Inspection Performed  Abdomen Inspection Performed  Back Inspection Performed  RUE Inspected  LUE Inspection Performed                   Diagram Legend     Erythematous scaling macule/papule c/w actinic keratosis       Vascular papule c/w angioma      Pigmented verrucoid papule/plaque c/w seborrheic keratosis      Yellow umbilicated papule c/w sebaceous hyperplasia      Irregularly shaped tan macule c/w lentigo     1-2 mm smooth white papules consistent with Milia      Movable subcutaneous cyst with punctum c/w epidermal inclusion cyst      Subcutaneous movable cyst c/w pilar cyst      Firm pink to brown papule c/w dermatofibroma      Pedunculated fleshy papule(s) c/w skin tag(s)      Evenly  pigmented macule c/w junctional nevus     Mildly variegated pigmented, slightly irregular-bordered macule c/w mildly atypical nevus      Flesh colored to evenly pigmented papule c/w intradermal nevus       Pink pearly papule/plaque c/w basal cell carcinoma      Erythematous hyperkeratotic cursted plaque c/w SCC      Surgical scar with no sign of skin cancer recurrence      Open and closed comedones      Inflammatory papules and pustules      Verrucoid papule consistent consistent with wart     Erythematous eczematous patches and plaques     Dystrophic onycholytic nail with subungual debris c/w onychomycosis     Umbilicated papule    Erythematous-base heme-crusted tan verrucoid plaque consistent with inflamed seborrheic keratosis     Erythematous Silvery Scaling Plaque c/w Psoriasis     See annotation      Assessment / Plan:        Common wart  Cryosurgery procedure note:    Verbal consent from the patient is obtained including, but not limited to, risk of hypopigmentation/hyperpigmentation, scar, recurrence of lesion. Liquid nitrogen cryosurgery is applied to 1 lesions to produce a freeze injury. The patient is aware that blisters may form and is instructed on wound care with gentle cleansing and use of vaseline ointment to keep moist until healed. The patient is supplied a handout on cryosurgery and is instructed to call if lesions do not completely resolve.    Recommend home maintenance for wart(s). After nightly soaking, patient should apply 40% salicylic acid  and cover. Remove in a.m. (or 12 hours later). Repeat nightly except night prior to next appointment. Instructional brochure provided. Discussed side effects of irritation, white color of skin with peeling, inflammation, pain/discomfort and to take a night or two off if area is too red and tender.  Use vaseline on surrounding normal skin to prevent irritation to that skin.     Ear cartilage deformity, right  Reassurance given to patient. No treatment is  necessary.   Treatment of benign, asymptomatic lesions may be considered cosmetic.    Morphea  In remission, stable.     Lipoma of scalp  Reassurance   Monitor for new symptoms of growth or pain and if this occurs contact MD for evaluation   Unchanged from previous visit            Follow up in about 1 year (around 4/3/2024).

## 2023-04-04 ENCOUNTER — PATIENT MESSAGE (OUTPATIENT)
Dept: DERMATOLOGY | Facility: CLINIC | Age: 48
End: 2023-04-04
Payer: COMMERCIAL

## 2023-04-09 ENCOUNTER — PATIENT MESSAGE (OUTPATIENT)
Dept: INTERNAL MEDICINE | Facility: CLINIC | Age: 48
End: 2023-04-09
Payer: COMMERCIAL

## 2023-04-10 NOTE — TELEPHONE ENCOUNTER
Called pt due to nature of complaint to get more info.  Pt c/o symptoms as noted in message and L chest pain when lying flat - especially when on her side. Gets better once in awhile when she's lying on her back. But not all the time. Denies SOB - even with pain. Pain is a pressure type pain. This has been happening x 4 weeks since she was ill with Covid type symptoms (tested negative). Denies N/V/diaphoresis.    Made appt tomorrow with available provider and gave pt emergency prompts.

## 2023-04-11 ENCOUNTER — HOSPITAL ENCOUNTER (OUTPATIENT)
Dept: RADIOLOGY | Facility: OTHER | Age: 48
Discharge: HOME | End: 2023-04-11
Attending: PHYSICIAN ASSISTANT
Payer: COMMERCIAL

## 2023-04-11 ENCOUNTER — OFFICE VISIT (OUTPATIENT)
Dept: INTERNAL MEDICINE | Facility: CLINIC | Age: 48
End: 2023-04-11
Payer: COMMERCIAL

## 2023-04-11 ENCOUNTER — HOSPITAL ENCOUNTER (OUTPATIENT)
Dept: CARDIOLOGY | Facility: HOSPITAL | Age: 48
Discharge: HOME | End: 2023-04-11
Attending: PHYSICIAN ASSISTANT
Payer: COMMERCIAL

## 2023-04-11 VITALS
BODY MASS INDEX: 32.8 KG/M2 | HEIGHT: 61 IN | OXYGEN SATURATION: 100 % | HEART RATE: 106 BPM | WEIGHT: 173.75 LBS | SYSTOLIC BLOOD PRESSURE: 120 MMHG | DIASTOLIC BLOOD PRESSURE: 90 MMHG

## 2023-04-11 DIAGNOSIS — R07.89 ATYPICAL CHEST PAIN: ICD-10-CM

## 2023-04-11 DIAGNOSIS — R07.89 ATYPICAL CHEST PAIN: Primary | ICD-10-CM

## 2023-04-11 PROCEDURE — 93010 EKG 12-LEAD: ICD-10-PCS | Mod: ,,, | Performed by: INTERNAL MEDICINE

## 2023-04-11 PROCEDURE — 71046 XR CHEST PA AND LATERAL: ICD-10-PCS | Mod: 26,,, | Performed by: RADIOLOGY

## 2023-04-11 PROCEDURE — 93227 HOLTER MONITOR - 48 HOUR (CUPID ONLY): ICD-10-PCS | Mod: ,,, | Performed by: INTERNAL MEDICINE

## 2023-04-11 PROCEDURE — 71046 X-RAY EXAM CHEST 2 VIEWS: CPT | Mod: 26,,, | Performed by: RADIOLOGY

## 2023-04-11 PROCEDURE — 99999 PR PBB SHADOW E&M-EST. PATIENT-LVL III: CPT | Mod: PBBFAC,,, | Performed by: PHYSICIAN ASSISTANT

## 2023-04-11 PROCEDURE — 93005 ELECTROCARDIOGRAM TRACING: CPT

## 2023-04-11 PROCEDURE — 71046 X-RAY EXAM CHEST 2 VIEWS: CPT | Mod: TC,FY

## 2023-04-11 PROCEDURE — 93226 XTRNL ECG REC<48 HR SCAN A/R: CPT

## 2023-04-11 PROCEDURE — 99999 PR PBB SHADOW E&M-EST. PATIENT-LVL III: ICD-10-PCS | Mod: PBBFAC,,, | Performed by: PHYSICIAN ASSISTANT

## 2023-04-11 PROCEDURE — 99214 OFFICE O/P EST MOD 30 MIN: CPT | Mod: S$GLB,,, | Performed by: PHYSICIAN ASSISTANT

## 2023-04-11 PROCEDURE — 93010 ELECTROCARDIOGRAM REPORT: CPT | Mod: ,,, | Performed by: INTERNAL MEDICINE

## 2023-04-11 PROCEDURE — 99214 PR OFFICE/OUTPT VISIT, EST, LEVL IV, 30-39 MIN: ICD-10-PCS | Mod: S$GLB,,, | Performed by: PHYSICIAN ASSISTANT

## 2023-04-11 PROCEDURE — 93227 XTRNL ECG REC<48 HR R&I: CPT | Mod: ,,, | Performed by: INTERNAL MEDICINE

## 2023-04-11 RX ORDER — HYDROXYZINE PAMOATE 25 MG/1
25 CAPSULE ORAL 4 TIMES DAILY
Qty: 20 CAPSULE | Refills: 0 | Status: SHIPPED | OUTPATIENT
Start: 2023-04-11 | End: 2024-01-12

## 2023-04-11 NOTE — PROGRESS NOTES
"INTERNAL MEDICINE URGENT VISIT NOTE    CHIEF COMPLAINT     Chief Complaint   Patient presents with    Chest Pain       HPI     Liliana Wright is a 48 y.o. female who presents for an urgent visit today.    PCP is Jenna Scott MD, patient is new to me.     Patient presents with complaints of left sided chest pain when laying flat.  That has been persistent since having a viral illness approximately 6 weeks ago.  Pleuritic pain at night with no orthopnea or PND.  No associated URI symptoms.  Had negative COVID test x2 but also reports lost sense of smell.  No shortness of breath or dyspnea on exertion.  No lower extremity swelling.  No persistent fevers.  Symptoms are improved with nonsteroidal anti-inflammatories however patient concerned about taking them so frequently.  She also endorses waves of anxiety that are keeping her from sleep at night.  Describes the chest pain as "squeezing pressure like there is not enough room in my chest for my heart."    Pt message pertaining to this OV:   Dr Scott I hope all is well, I am writing you regarding a possible flareup for costochondritis. Not sure if this is a flareup, but Ive been having this pain on my chest since I got sick about six weeks ago. I am not sure if I contracted Covid since both test came back negative however I still  have no sense of smell after 6 weeks of getting a cold/troath virus,  my ear still bothering m( i have seen an ENt). I would like to be seen to address this pain on my chest on my left side which tends to occur when its time to go to bed, this pain is different. Please let me know if I can be seen  soon.    Past Medical History:  Past Medical History:   Diagnosis Date    Abnormal Pap smear 2007    GEGE I on bx     Cervical high risk HPV (human papillomavirus) test positive     PCO (polycystic ovaries) 3/13/2014    Took Metformin during first two pregnancies, stopped before conception this pregnancy and does not want to take     " Previous  section 3/13/2014    X 2, induction and FTP, 2nd preg PROM at 37wk with repeat     Rh negative status during pregnancy 3/14/2014    Rhogam at 28wk     Vegetarian 2017       Home Medications:  Prior to Admission medications    Medication Sig Start Date End Date Taking? Authorizing Provider   ascorbic acid (VITAMIN C ORAL) Take by mouth.   Yes Historical Provider   biotin 5,000 mcg TbDL Take by mouth once daily.   Yes Historical Provider   ergocalciferol, vitamin D2, (VITAMIN D ORAL) Take by mouth.   Yes Historical Provider   fluticasone propionate (FLONASE) 50 mcg/actuation nasal spray 2 sprays (100 mcg total) by Each Nostril route once daily. 3/27/23  Yes ANAMIKA Yousif MD   MAGNESIUM ORAL Take by mouth.   Yes Historical Provider   POTASSIUM ORAL Take by mouth.   Yes Historical Provider   psyllium seed, with sugar, (FIBER ORAL) Take by mouth.   Yes Historical Provider   UNABLE TO FIND medication name: Oxygen pills   Yes Historical Provider       Review of Systems:  Review of Systems   Constitutional:  Negative for chills and fever.   HENT:  Negative for sore throat and trouble swallowing.    Eyes:  Negative for visual disturbance.   Respiratory:  Positive for chest tightness. Negative for cough and shortness of breath.    Cardiovascular:  Negative for chest pain.   Gastrointestinal:  Negative for abdominal pain, constipation, diarrhea, nausea and vomiting.   Genitourinary:  Negative for dysuria and flank pain.   Musculoskeletal:  Negative for back pain, neck pain and neck stiffness.   Skin:  Negative for rash.   Neurological:  Negative for dizziness, syncope, weakness and headaches.   Psychiatric/Behavioral:  Negative for confusion.      Health Maintainence:   Immunizations:  Health Maintenance         Date Due Completion Date    Hemoglobin A1c (Diabetic Prevention Screening) 2020    COVID-19 Vaccine (4 - Booster for Pfizer series) 01/10/2022 11/15/2021    Colorectal Cancer  "Screening Never done ---    Influenza Vaccine (1) 09/01/2022 10/28/2019    Mammogram 04/12/2023 4/12/2022    Cervical Cancer Screening 04/29/2026 4/29/2021    Override on 8/7/2012: Done    Lipid Panel 12/21/2027 12/21/2022    TETANUS VACCINE 03/01/2029 3/1/2019 (Declined)    Override on 3/1/2019: Declined    Override on 3/23/2018: Declined    Override on 1/13/2017: Declined             PHYSICAL EXAM     BP (!) 120/90   Pulse 106   Ht 5' 1" (1.549 m)   Wt 78.8 kg (173 lb 11.6 oz)   SpO2 100%   BMI 32.82 kg/m²     Physical Exam  Vitals and nursing note reviewed.   Constitutional:       Appearance: Normal appearance.      Comments: Healthy-appearing female in no acute distress or apparent pain.  She makes good eye contact, speaks in clear full sentences and is cooperative.   HENT:      Head: Normocephalic and atraumatic.      Nose: Nose normal.      Mouth/Throat:      Pharynx: Oropharynx is clear.   Eyes:      Conjunctiva/sclera: Conjunctivae normal.   Cardiovascular:      Rate and Rhythm: Normal rate and regular rhythm.      Pulses: Normal pulses.   Pulmonary:      Effort: No respiratory distress.      Comments: Mild reproducible tenderness to chest wall  Clear lungs auscultation bilaterally  Rhythm increase with deep breathing suspected sinus arrhythmia.  No murmurs rubs or gallops  Abdominal:      Tenderness: There is no abdominal tenderness.   Musculoskeletal:         General: Normal range of motion.      Cervical back: No rigidity.   Skin:     General: Skin is warm and dry.      Capillary Refill: Capillary refill takes less than 2 seconds.      Findings: No rash.   Neurological:      General: No focal deficit present.      Mental Status: She is alert.      Gait: Gait normal.   Psychiatric:         Mood and Affect: Mood normal.       LABS     Lab Results   Component Value Date    HGBA1C 5.1 01/14/2017     CMP  Sodium   Date Value Ref Range Status   12/21/2022 139 136 - 145 mmol/L Final     Potassium   Date " Value Ref Range Status   12/21/2022 3.9 3.5 - 5.1 mmol/L Final     Chloride   Date Value Ref Range Status   12/21/2022 107 95 - 110 mmol/L Final     CO2   Date Value Ref Range Status   12/21/2022 25 23 - 29 mmol/L Final     Glucose   Date Value Ref Range Status   12/21/2022 93 70 - 110 mg/dL Final     BUN   Date Value Ref Range Status   12/21/2022 7 6 - 20 mg/dL Final     Creatinine   Date Value Ref Range Status   12/21/2022 0.8 0.5 - 1.4 mg/dL Final     Calcium   Date Value Ref Range Status   12/21/2022 9.3 8.7 - 10.5 mg/dL Final     Total Protein   Date Value Ref Range Status   12/28/2022 7.6 6.0 - 8.4 g/dL Final     Albumin   Date Value Ref Range Status   12/28/2022 4.1 3.5 - 5.2 g/dL Final     Total Bilirubin   Date Value Ref Range Status   12/28/2022 0.4 0.1 - 1.0 mg/dL Final     Comment:     For infants and newborns, interpretation of results should be based  on gestational age, weight and in agreement with clinical  observations.    Premature Infant recommended reference ranges:  Up to 24 hours.............<8.0 mg/dL  Up to 48 hours............<12.0 mg/dL  3-5 days..................<15.0 mg/dL  6-29 days.................<15.0 mg/dL       Alkaline Phosphatase   Date Value Ref Range Status   12/28/2022 69 55 - 135 U/L Final     AST   Date Value Ref Range Status   12/28/2022 21 10 - 40 U/L Final     ALT   Date Value Ref Range Status   12/28/2022 42 10 - 44 U/L Final     Anion Gap   Date Value Ref Range Status   12/21/2022 7 (L) 8 - 16 mmol/L Final     eGFR if    Date Value Ref Range Status   12/01/2021 >60 >60 mL/min/1.73 m^2 Final     eGFR if non    Date Value Ref Range Status   12/01/2021 >60 >60 mL/min/1.73 m^2 Final     Comment:     Calculation used to obtain the estimated glomerular filtration  rate (eGFR) is the CKD-EPI equation.        Lab Results   Component Value Date    WBC 5.28 12/21/2022    HGB 12.4 12/21/2022    HCT 38.1 12/21/2022    MCV 83 12/21/2022      12/21/2022     Lab Results   Component Value Date    CHOL 191 12/21/2022    CHOL 211 (H) 12/01/2021    CHOL 227 (H) 09/23/2020     Lab Results   Component Value Date    HDL 37 (L) 12/21/2022    HDL 43 12/01/2021    HDL 39 (L) 09/23/2020     Lab Results   Component Value Date    LDLCALC 133.6 12/21/2022    LDLCALC 146.4 12/01/2021    LDLCALC 155.2 09/23/2020     Lab Results   Component Value Date    TRIG 102 12/21/2022    TRIG 108 12/01/2021    TRIG 164 (H) 09/23/2020     Lab Results   Component Value Date    CHOLHDL 19.4 (L) 12/21/2022    CHOLHDL 20.4 12/01/2021    CHOLHDL 17.2 (L) 09/23/2020     Lab Results   Component Value Date    TSH 0.676 12/21/2022       ASSESSMENT/PLAN     Liliana Wright is a 48 y.o. female     Liliana was seen today for chest pain.  Atypical chest pain could be related to postinflammatory syndrome after viral illness [COVID with false negative home testing?]  Will check inflammatory markers, EKG, chest x-ray and Holter monitor.  Will prescribe Vistaril for symptomatic anxiety management.  Will follow-up in clinic in 2 weeks for symptom recheck. ED prompts discussed.     Diagnoses and all orders for this visit:    Atypical chest pain  -     Sedimentation rate; Future  -     C-REACTIVE PROTEIN; Future  -     EKG 12-lead; Future  -     X-Ray Chest PA And Lateral; Future  -     Holter monitor - 48 hour; Future    Other orders  -     hydrOXYzine pamoate (VISTARIL) 25 MG Cap; Take 1 capsule (25 mg total) by mouth 4 (four) times daily.       Patient was counseled on when and how to seek emergent care.       Bonnie Delgado PA-C   Department of Internal Medicine - [facility]   12:50 PM

## 2023-04-12 ENCOUNTER — PATIENT MESSAGE (OUTPATIENT)
Dept: INTERNAL MEDICINE | Facility: CLINIC | Age: 48
End: 2023-04-12
Payer: COMMERCIAL

## 2023-04-12 ENCOUNTER — PATIENT MESSAGE (OUTPATIENT)
Dept: ADMINISTRATIVE | Facility: HOSPITAL | Age: 48
End: 2023-04-12
Payer: COMMERCIAL

## 2023-04-13 ENCOUNTER — OFFICE VISIT (OUTPATIENT)
Dept: CARDIOLOGY | Facility: CLINIC | Age: 48
End: 2023-04-13
Payer: COMMERCIAL

## 2023-04-13 ENCOUNTER — PATIENT MESSAGE (OUTPATIENT)
Dept: INTERNAL MEDICINE | Facility: CLINIC | Age: 48
End: 2023-04-13
Payer: COMMERCIAL

## 2023-04-13 VITALS
DIASTOLIC BLOOD PRESSURE: 86 MMHG | BODY MASS INDEX: 32.51 KG/M2 | HEIGHT: 61 IN | OXYGEN SATURATION: 99 % | WEIGHT: 172.19 LBS | RESPIRATION RATE: 15 BRPM | HEART RATE: 93 BPM | SYSTOLIC BLOOD PRESSURE: 122 MMHG

## 2023-04-13 DIAGNOSIS — R07.89 ATYPICAL CHEST PAIN: Primary | ICD-10-CM

## 2023-04-13 DIAGNOSIS — R07.89 ATYPICAL CHEST PAIN: ICD-10-CM

## 2023-04-13 DIAGNOSIS — R06.09 DOE (DYSPNEA ON EXERTION): ICD-10-CM

## 2023-04-13 DIAGNOSIS — R07.89 CHEST PAIN, ATYPICAL: ICD-10-CM

## 2023-04-13 PROCEDURE — 99999 PR PBB SHADOW E&M-EST. PATIENT-LVL IV: CPT | Mod: PBBFAC,,, | Performed by: INTERNAL MEDICINE

## 2023-04-13 PROCEDURE — 99204 PR OFFICE/OUTPT VISIT, NEW, LEVL IV, 45-59 MIN: ICD-10-PCS | Mod: S$GLB,,, | Performed by: INTERNAL MEDICINE

## 2023-04-13 PROCEDURE — 99999 PR PBB SHADOW E&M-EST. PATIENT-LVL IV: ICD-10-PCS | Mod: PBBFAC,,, | Performed by: INTERNAL MEDICINE

## 2023-04-13 PROCEDURE — 99204 OFFICE O/P NEW MOD 45 MIN: CPT | Mod: S$GLB,,, | Performed by: INTERNAL MEDICINE

## 2023-04-13 NOTE — PROGRESS NOTES
"Subjective:   Patient ID:  Liliana Wright is a 48 y.o. female who presents for evaluation of Consult      HPI    Referred by PA  Patient presents with complaints of left sided chest pain when laying flat.  That has been persistent since having a viral illness approximately 6 weeks ago.  Pleuritic pain at night with no orthopnea or PND.  No associated URI symptoms.  Had negative COVID test x2 but also reports lost sense of smell.  No shortness of breath or dyspnea on exertion.  No lower extremity swelling.  No persistent fevers.  Symptoms are improved with nonsteroidal anti-inflammatories however patient concerned about taking them so frequently.  She also endorses waves of anxiety that are keeping her from sleep at night.  Describes the chest pain as "squeezing pressure like there is not enough room in my chest for my heart."     Pt message pertaining to this OV:   Dr Scott I hope all is well, I am writing you regarding a possible flareup for costochondritis. Not sure if this is a flareup, but Ive been having this pain on my chest since I got sick about six weeks ago. I am not sure if I contracted Covid since both test came back negative however I still  have no sense of smell after 6 weeks of getting a cold/troath virus,  my ear still bothering m( i have seen an ENt). I would like to be seen to address this pain on my chest on my left side which tends to occur when its time to go to bed, this pain is different. Please let me know if I can be seen  soon.    EKG 4/11/23 NSR possible old IMI     Stress echo 2013    1 - Normal biventricular systolic function (LVEF 60%).     2 - Normal LV diastolic function.   No evidence of stress induced myocardial ischemia.     Holter 4/11/23 pending    4/13/23 Still with intermittent CP, mild SOB after recent URI  BP controlled      Review of Systems   Constitutional: Negative for decreased appetite.   HENT:  Negative for ear discharge.    Eyes:  Negative for blurred vision. "   Respiratory:  Negative for hemoptysis.    Endocrine: Negative for polyphagia.   Hematologic/Lymphatic: Negative for adenopathy.   Skin:  Negative for color change.   Musculoskeletal:  Negative for joint swelling.   Genitourinary:  Negative for bladder incontinence.   Neurological:  Negative for brief paralysis.   Psychiatric/Behavioral:  Negative for hallucinations.    Allergic/Immunologic: Negative for hives.     Objective:   Physical Exam  Constitutional:       Appearance: She is well-developed.   HENT:      Head: Normocephalic and atraumatic.   Eyes:      Conjunctiva/sclera: Conjunctivae normal.      Pupils: Pupils are equal, round, and reactive to light.   Cardiovascular:      Rate and Rhythm: Normal rate.      Pulses: Intact distal pulses.      Heart sounds: Normal heart sounds.   Pulmonary:      Effort: Pulmonary effort is normal.      Breath sounds: Normal breath sounds.   Abdominal:      General: Bowel sounds are normal.      Palpations: Abdomen is soft.   Musculoskeletal:         General: Normal range of motion.      Cervical back: Normal range of motion and neck supple.   Skin:     General: Skin is warm and dry.   Neurological:      Mental Status: She is alert and oriented to person, place, and time.       Assessment:      1. Atypical chest pain    2. BEAR (dyspnea on exertion)    3. Chest pain, atypical        Plan:     Stress echo for CP, SOB, and EKG with IMI  F/U holter when processed

## 2023-04-14 ENCOUNTER — PATIENT OUTREACH (OUTPATIENT)
Dept: ADMINISTRATIVE | Facility: HOSPITAL | Age: 48
End: 2023-04-14
Payer: COMMERCIAL

## 2023-04-14 LAB
OHS CV EVENT MONITOR DAY: 2
OHS CV HOLTER LENGTH DECIMAL HOURS: 96
OHS CV HOLTER LENGTH HOURS: 48
OHS CV HOLTER LENGTH MINUTES: 0
OHS CV HOLTER SINUS AVERAGE HR: 82
OHS CV HOLTER SINUS MAX HR: 136
OHS CV HOLTER SINUS MIN HR: 46

## 2023-04-17 ENCOUNTER — HOSPITAL ENCOUNTER (OUTPATIENT)
Dept: CARDIOLOGY | Facility: HOSPITAL | Age: 48
Discharge: HOME | End: 2023-04-17
Attending: INTERNAL MEDICINE
Payer: COMMERCIAL

## 2023-04-17 DIAGNOSIS — R06.09 DOE (DYSPNEA ON EXERTION): ICD-10-CM

## 2023-04-17 DIAGNOSIS — R07.89 ATYPICAL CHEST PAIN: ICD-10-CM

## 2023-04-17 DIAGNOSIS — R07.89 CHEST PAIN, ATYPICAL: ICD-10-CM

## 2023-04-17 LAB
AORTIC ROOT ANNULUS: 3.19 CM
AORTIC VALVE CUSP SEPERATION: 2.25 CM
ASCENDING AORTA: 2.23 CM
AV INDEX (PROSTH): 0.73
AV MEAN GRADIENT: 7 MMHG
AV PEAK GRADIENT: 12 MMHG
AV VALVE AREA: 2.11 CM2
AV VELOCITY RATIO: 0.81
CV ECHO LV RWT: 0.58 CM
CV STRESS BASE HR: 68 BPM
DIASTOLIC BLOOD PRESSURE: 62 MMHG
DOP CALC AO PEAK VEL: 1.73 M/S
DOP CALC AO VTI: 39.3 CM
DOP CALC LVOT AREA: 2.9 CM2
DOP CALC LVOT DIAMETER: 1.92 CM
DOP CALC LVOT PEAK VEL: 1.4 M/S
DOP CALC LVOT STROKE VOLUME: 83.05 CM3
DOP CALCLVOT PEAK VEL VTI: 28.7 CM
E WAVE DECELERATION TIME: 199.99 MSEC
E/A RATIO: 1.23
E/E' RATIO: 8.53 M/S
ECHO LV POSTERIOR WALL: 1.08 CM (ref 0.6–1.1)
EJECTION FRACTION: 60 %
FRACTIONAL SHORTENING: 29 % (ref 28–44)
INTERVENTRICULAR SEPTUM: 1.08 CM (ref 0.6–1.1)
IVC DIAMETER: 1.11 CM
LA MAJOR: 5.26 CM
LA MINOR: 5.05 CM
LA WIDTH: 3.3 CM
LEFT ATRIUM SIZE: 4.31 CM
LEFT ATRIUM VOLUME: 62.3 CM3
LEFT INTERNAL DIMENSION IN SYSTOLE: 2.65 CM (ref 2.1–4)
LEFT VENTRICLE DIASTOLIC VOLUME: 59.18 ML
LEFT VENTRICLE SYSTOLIC VOLUME: 25.79 ML
LEFT VENTRICULAR INTERNAL DIMENSION IN DIASTOLE: 3.73 CM (ref 3.5–6)
LEFT VENTRICULAR MASS: 127.43 G
LV LATERAL E/E' RATIO: 8 M/S
LV SEPTAL E/E' RATIO: 9.14 M/S
LVOT MG: 3.93 MMHG
LVOT MV: 0.9 CM/S
MV PEAK A VEL: 1.04 M/S
MV PEAK E VEL: 1.28 M/S
MV STENOSIS PRESSURE HALF TIME: 46.83 MS
MV VALVE AREA P 1/2 METHOD: 4.7 CM2
OHS CV CPX 1 MINUTE RECOVERY HEART RATE: 146 BPM
OHS CV CPX 85 PERCENT MAX PREDICTED HEART RATE MALE: 141
OHS CV CPX ESTIMATED METS: 7
OHS CV CPX MAX PREDICTED HEART RATE: 166
OHS CV CPX PATIENT IS FEMALE: 1
OHS CV CPX PATIENT IS MALE: 0
OHS CV CPX PEAK DIASTOLIC BLOOD PRESSURE: 79 MMHG
OHS CV CPX PEAK HEAR RATE: 162 BPM
OHS CV CPX PEAK RATE PRESSURE PRODUCT: NORMAL
OHS CV CPX PEAK SYSTOLIC BLOOD PRESSURE: 137 MMHG
OHS CV CPX PERCENT MAX PREDICTED HEART RATE ACHIEVED: 98
OHS CV CPX RATE PRESSURE PRODUCT PRESENTING: 6800
PISA TR MAX VEL: 2.42 M/S
PV PEAK VELOCITY: 1 CM/S
RA MAJOR: 4.4 CM
RA PRESSURE: 3 MMHG
RA WIDTH: 3.8 CM
RIGHT VENTRICULAR END-DIASTOLIC DIMENSION: 2.59 CM
SINUS: 3.2 CM
STJ: 2.26 CM
STRESS ECHO POST EXERCISE DUR MIN: 5 MINUTES
STRESS ECHO POST EXERCISE DUR SEC: 6 SECONDS
SYSTOLIC BLOOD PRESSURE: 100 MMHG
TDI LATERAL: 0.16 M/S
TDI SEPTAL: 0.14 M/S
TDI: 0.15 M/S
TR MAX PG: 23 MMHG
TRICUSPID ANNULAR PLANE SYSTOLIC EXCURSION: 2.9 CM
TV REST PULMONARY ARTERY PRESSURE: 26 MMHG

## 2023-04-17 PROCEDURE — 93320 STRESS ECHO (CUPID ONLY): ICD-10-PCS | Mod: 26,,, | Performed by: INTERNAL MEDICINE

## 2023-04-17 PROCEDURE — 93325 DOPPLER ECHO COLOR FLOW MAPG: CPT

## 2023-04-17 PROCEDURE — 93325 DOPPLER ECHO COLOR FLOW MAPG: CPT | Mod: 26,,, | Performed by: INTERNAL MEDICINE

## 2023-04-17 PROCEDURE — 93351 STRESS TTE COMPLETE: CPT | Mod: 26,,, | Performed by: INTERNAL MEDICINE

## 2023-04-17 PROCEDURE — 93325 STRESS ECHO (CUPID ONLY): ICD-10-PCS | Mod: 26,,, | Performed by: INTERNAL MEDICINE

## 2023-04-17 PROCEDURE — 93351 STRESS ECHO (CUPID ONLY): ICD-10-PCS | Mod: 26,,, | Performed by: INTERNAL MEDICINE

## 2023-04-17 PROCEDURE — 93320 DOPPLER ECHO COMPLETE: CPT | Mod: 26,,, | Performed by: INTERNAL MEDICINE

## 2023-04-21 ENCOUNTER — OFFICE VISIT (OUTPATIENT)
Dept: CARDIOLOGY | Facility: CLINIC | Age: 48
End: 2023-04-21
Payer: COMMERCIAL

## 2023-04-21 VITALS
RESPIRATION RATE: 15 BRPM | HEART RATE: 95 BPM | WEIGHT: 171.94 LBS | SYSTOLIC BLOOD PRESSURE: 122 MMHG | HEIGHT: 61 IN | BODY MASS INDEX: 32.46 KG/M2 | OXYGEN SATURATION: 100 % | DIASTOLIC BLOOD PRESSURE: 78 MMHG

## 2023-04-21 DIAGNOSIS — R06.09 DOE (DYSPNEA ON EXERTION): Primary | ICD-10-CM

## 2023-04-21 DIAGNOSIS — R07.89 CHEST PAIN, ATYPICAL: ICD-10-CM

## 2023-04-21 PROCEDURE — 99213 OFFICE O/P EST LOW 20 MIN: CPT | Mod: S$GLB,,, | Performed by: INTERNAL MEDICINE

## 2023-04-21 PROCEDURE — 99213 PR OFFICE/OUTPT VISIT, EST, LEVL III, 20-29 MIN: ICD-10-PCS | Mod: S$GLB,,, | Performed by: INTERNAL MEDICINE

## 2023-04-21 PROCEDURE — 99999 PR PBB SHADOW E&M-EST. PATIENT-LVL IV: ICD-10-PCS | Mod: PBBFAC,,, | Performed by: INTERNAL MEDICINE

## 2023-04-21 PROCEDURE — 99999 PR PBB SHADOW E&M-EST. PATIENT-LVL IV: CPT | Mod: PBBFAC,,, | Performed by: INTERNAL MEDICINE

## 2023-04-21 NOTE — PROGRESS NOTES
"Subjective:   Patient ID:  Liliana Wright is a 48 y.o. female who presents for follow-up of No chief complaint on file.      HPI    Referred by PA  Patient presents with complaints of left sided chest pain when laying flat.  That has been persistent since having a viral illness approximately 6 weeks ago.  Pleuritic pain at night with no orthopnea or PND.  No associated URI symptoms.  Had negative COVID test x2 but also reports lost sense of smell.  No shortness of breath or dyspnea on exertion.  No lower extremity swelling.  No persistent fevers.  Symptoms are improved with nonsteroidal anti-inflammatories however patient concerned about taking them so frequently.  She also endorses waves of anxiety that are keeping her from sleep at night.  Describes the chest pain as "squeezing pressure like there is not enough room in my chest for my heart."     Pt message pertaining to this OV:   Dr Scott I hope all is well, I am writing you regarding a possible flareup for costochondritis. Not sure if this is a flareup, but Ive been having this pain on my chest since I got sick about six weeks ago. I am not sure if I contracted Covid since both test came back negative however I still  have no sense of smell after 6 weeks of getting a cold/troath virus,  my ear still bothering m( i have seen an ENt). I would like to be seen to address this pain on my chest on my left side which tends to occur when its time to go to bed, this pain is different. Please let me know if I can be seen  soon.     EKG 4/11/23 NSR possible old IMI     Stress echo 4/17/23  The left ventricle is normal in size with normal systolic function.  The estimated ejection fraction is 60%.  Normal left ventricular diastolic function.  Normal right ventricular size with normal right ventricular systolic function.  Normal central venous pressure (3 mmHg).  The estimated PA systolic pressure is 26 mmHg.  There were no arrhythmias during stress.  The ECG portion of " this study is negative for myocardial ischemia.  The stress echo portion of this study is negative for myocardial ischemia.     Holter 4/11/23  Sinus rhythm with heart rates varying between 46 and 136 BPM with an average of 82 BPM.  There were PVCs totalling 0  There were very rare PACs totalling 232 and averaging 2.42 per hour.     4/13/23 Still with intermittent CP, mild SOB after recent URI  BP controlled   Stress echo for CP, SOB, and EKG with IMI  F/U holter when processed    4/21/23 Denies CP or SOB  Occasional palpitations       Review of Systems   Constitutional: Negative for decreased appetite.   HENT:  Negative for ear discharge.    Eyes:  Negative for blurred vision.   Respiratory:  Negative for hemoptysis.    Endocrine: Negative for polyphagia.   Hematologic/Lymphatic: Negative for adenopathy.   Skin:  Negative for color change.   Musculoskeletal:  Negative for joint swelling.   Genitourinary:  Negative for bladder incontinence.   Neurological:  Negative for brief paralysis.   Psychiatric/Behavioral:  Negative for hallucinations.    Allergic/Immunologic: Negative for hives.     Objective:   Physical Exam  Constitutional:       Appearance: She is well-developed.   HENT:      Head: Normocephalic and atraumatic.   Eyes:      Conjunctiva/sclera: Conjunctivae normal.      Pupils: Pupils are equal, round, and reactive to light.   Cardiovascular:      Rate and Rhythm: Normal rate.      Pulses: Intact distal pulses.      Heart sounds: Normal heart sounds.   Pulmonary:      Effort: Pulmonary effort is normal.      Breath sounds: Normal breath sounds.   Abdominal:      General: Bowel sounds are normal.      Palpations: Abdomen is soft.   Musculoskeletal:         General: Normal range of motion.      Cervical back: Normal range of motion and neck supple.   Skin:     General: Skin is warm and dry.   Neurological:      Mental Status: She is alert and oriented to person, place, and time.       Assessment:      1. BEAR  (dyspnea on exertion)    2. Chest pain, atypical        Plan:     Stress echo and holter ok  Continue observation  F/U prn

## 2023-05-06 ENCOUNTER — PATIENT MESSAGE (OUTPATIENT)
Dept: DERMATOLOGY | Facility: CLINIC | Age: 48
End: 2023-05-06
Payer: COMMERCIAL

## 2023-05-06 DIAGNOSIS — L94.0 MORPHEA: Primary | ICD-10-CM

## 2023-05-08 RX ORDER — TRIAMCINOLONE ACETONIDE 1 MG/G
CREAM TOPICAL
Qty: 30 G | Refills: 3 | Status: SHIPPED | OUTPATIENT
Start: 2023-05-08 | End: 2024-01-12

## 2023-05-15 ENCOUNTER — CLINICAL SUPPORT (OUTPATIENT)
Dept: OTOLARYNGOLOGY | Facility: CLINIC | Age: 48
End: 2023-05-15
Payer: COMMERCIAL

## 2023-05-15 DIAGNOSIS — H93.A1 PULSATILE TINNITUS OF RIGHT EAR: Primary | ICD-10-CM

## 2023-05-15 DIAGNOSIS — Z01.10 ENCOUNTER FOR HEARING EXAMINATION WITHOUT ABNORMAL FINDINGS: ICD-10-CM

## 2023-05-15 DIAGNOSIS — R29.2 ABNORMAL ACOUSTIC REFLEX: ICD-10-CM

## 2023-05-15 PROCEDURE — 92550 TYMPANOMETRY & REFLEX THRESH: CPT | Mod: S$GLB,,, | Performed by: AUDIOLOGIST-HEARING AID FITTER

## 2023-05-15 PROCEDURE — 92557 COMPREHENSIVE HEARING TEST: CPT | Mod: S$GLB,,, | Performed by: AUDIOLOGIST-HEARING AID FITTER

## 2023-05-15 PROCEDURE — 92557 PR COMPREHENSIVE HEARING TEST: ICD-10-PCS | Mod: S$GLB,,, | Performed by: AUDIOLOGIST-HEARING AID FITTER

## 2023-05-15 PROCEDURE — 92550 PR TYMPANOMETRY AND REFLEX THRESHOLD MEASUREMENTS: ICD-10-PCS | Mod: S$GLB,,, | Performed by: AUDIOLOGIST-HEARING AID FITTER

## 2023-05-15 NOTE — Clinical Note
Your patient, Liliana Wright, was recently seen for an audiogram.  My assessment and recommendations are enclosed.  If you should have any questions or concerns, please contact me at [phone].   Sincerely, Darien Mcbride, CCC-A Audiologist [facility]

## 2023-05-15 NOTE — PROGRESS NOTES
Darien Mcbride, CCC-A  Audiologist - [facility] [address] [place] [address]  [email or web address]  [phone]    Patient: Liliana Wright   MRN: [id number]  [address]   Home Phone [phone]   Work Phone Not on file.   Mobile [phone]   : 1975  BEAR: 5/15/2023      AUDIOLOGICAL EVALUATION      IMPRESSION:   Audiological testing indicated that Liliana Wright has normal hearing in both ears.    RECOMMENDATIONS:   It is recommended that she:  Follow up medically with Dr. Yousif.  Use precaution and/or hearing protection in noisy environments.    If you should have any questions or concerns regarding the above information, please do not hesitate to contact me at [phone].      _______________________________  Darien Mcbride, SAEID-A  Audiologist

## 2023-05-23 ENCOUNTER — TELEPHONE (OUTPATIENT)
Dept: ENDOSCOPY | Facility: HOSPITAL | Age: 48
End: 2023-05-23
Payer: COMMERCIAL

## 2023-05-23 ENCOUNTER — TELEPHONE (OUTPATIENT)
Dept: INTERNAL MEDICINE | Facility: CLINIC | Age: 48
End: 2023-05-23
Payer: COMMERCIAL

## 2023-05-23 ENCOUNTER — OFFICE VISIT (OUTPATIENT)
Dept: OTOLARYNGOLOGY | Facility: CLINIC | Age: 48
End: 2023-05-23
Payer: COMMERCIAL

## 2023-05-23 VITALS — WEIGHT: 171 LBS | BODY MASS INDEX: 32.28 KG/M2 | HEIGHT: 61 IN

## 2023-05-23 DIAGNOSIS — J34.2 NASAL SEPTAL DEVIATION: ICD-10-CM

## 2023-05-23 DIAGNOSIS — H93.A1 PULSATILE TINNITUS OF RIGHT EAR: Primary | ICD-10-CM

## 2023-05-23 DIAGNOSIS — Z12.11 SCREENING FOR COLON CANCER: Primary | ICD-10-CM

## 2023-05-23 DIAGNOSIS — Z12.11 SCREEN FOR COLON CANCER: Primary | ICD-10-CM

## 2023-05-23 DIAGNOSIS — R43.9 SMELL OR TASTE SENSATION DISTURBANCE: ICD-10-CM

## 2023-05-23 DIAGNOSIS — J30.89 NON-SEASONAL ALLERGIC RHINITIS, UNSPECIFIED TRIGGER: ICD-10-CM

## 2023-05-23 PROCEDURE — 99999 PR PBB SHADOW E&M-EST. PATIENT-LVL II: ICD-10-PCS | Mod: PBBFAC,,, | Performed by: SPECIALIST

## 2023-05-23 PROCEDURE — 99214 OFFICE O/P EST MOD 30 MIN: CPT | Mod: S$GLB,,, | Performed by: SPECIALIST

## 2023-05-23 PROCEDURE — 99999 PR PBB SHADOW E&M-EST. PATIENT-LVL II: CPT | Mod: PBBFAC,,, | Performed by: SPECIALIST

## 2023-05-23 PROCEDURE — 99214 PR OFFICE/OUTPT VISIT, EST, LEVL IV, 30-39 MIN: ICD-10-PCS | Mod: S$GLB,,, | Performed by: SPECIALIST

## 2023-05-23 RX ORDER — SODIUM, POTASSIUM,MAG SULFATES 17.5-3.13G
1 SOLUTION, RECONSTITUTED, ORAL ORAL DAILY
Qty: 1 KIT | Refills: 0 | Status: SHIPPED | OUTPATIENT
Start: 2023-05-23 | End: 2023-05-25

## 2023-05-23 RX ORDER — LORATADINE 10 MG/1
10 TABLET ORAL DAILY
Qty: 30 TABLET | Refills: 11
Start: 2023-05-23 | End: 2024-01-12

## 2023-05-23 NOTE — PROGRESS NOTES
Subjective:       Patient ID: Liliana Wright is a 48 y.o. female.    Chief Complaint: No chief complaint on file.    The patient is returning for a follow-up visit.  There are multiple issues to discuss:    1. Pulsatile tinnitus, right ear:  The patient has experienced pulsatile tinnitus in her right ear only approximately 2 months ago she developed an upper respiratory infection which left her with a sore throat.  That progressed to pain and pulsatile tinnitus in her right ear.  The pain is resolved but she continues to have pulsatile tinnitus.  The tinnitus is becoming less frequent and less severe.  Typical episodes last 60 seconds or less.  It occurs primarily when she stands up quickly or changes positions from sitting to standing.  2. Allergic rhinitis:  At last visit I did start her on fluticasone nasal spray.  It has helped with her nasal breathing but has had no effect whatsoever on the pulsatile tinnitus.  3. Altered sense of smell and taste:  When she developed the upper respiratory infection she developed significant diminished sense of smell and taste.  She did test herself at home for COVID 2 times, both of which were negative.  Her sense of smell has improved by about 50% but is definitely not normal.  She is not having parosmia.        Review of Systems     Constitutional: Negative for appetite change, chills, fatigue, fever and unexpected weight loss.      HENT: Positive for ear infection, ear pain, hearing loss, postnasal drip, runny nose, sore throat and stuffy nose.  Negative for ear discharge, facial swelling, mouth sores, nosebleeds, ringing in the ears, sinus infection, sinus pressure, tonsil infection, dental problems, trouble swallowing and voice change.      Eyes:  Negative for change in eyesight, eye drainage, eye itching and photophobia.     Respiratory:  Negative for cough, shortness of breath, sleep apnea, snoring and wheezing.      Cardiovascular:  Negative for chest pain, foot  swelling, irregular heartbeat and swollen veins.     Gastrointestinal:  Negative for abdominal pain, acid reflux, constipation, diarrhea, heartburn and vomiting.     Genitourinary: Negative for difficulty urinating, sexual problems and frequent urination.     Musc: Negative for aching joints, aching muscles, back pain and neck pain.     Skin: Negative for rash.     Allergy: Negative for food allergies and seasonal allergies.     Endocrine: Negative for cold intolerance and heat intolerance.      Neurological: Negative for dizziness, headaches, light-headedness, seizures and tremors.      Hematologic: Negative for bruises/bleeds easily and swollen glands.      Psychiatric: Negative for decreased concentration, depression, nervous/anxious and sleep disturbance.              Objective:      Physical Exam  Vitals and nursing note reviewed.   Constitutional:       General: She is awake.      Appearance: Normal appearance. She is well-developed, well-groomed and normal weight.   HENT:      Head: Normocephalic.      Jaw: There is normal jaw occlusion.      Salivary Glands: Right salivary gland is not diffusely enlarged or tender. Left salivary gland is not diffusely enlarged or tender.      Right Ear: Hearing, ear canal and external ear normal. Tympanic membrane is retracted. Tympanic membrane has decreased mobility.      Left Ear: Hearing, ear canal and external ear normal. Tympanic membrane is retracted. Tympanic membrane has normal mobility.      Nose: Septal deviation (to the left), mucosal edema (cyanotic, boggy inferior turbinates bilaterally) and rhinorrhea (clear mucus bilaterally) present. No nasal deformity. Rhinorrhea is clear.      Right Turbinates: Enlarged and pale.      Left Turbinates: Enlarged and pale.      Mouth/Throat:      Lips: No lesions.      Mouth: Mucous membranes are moist. No oral lesions.      Dentition: No gum lesions.      Tongue: No lesions.      Palate: No mass and lesions.      Pharynx:  Oropharynx is clear. Uvula midline.   Eyes:      General: Lids are normal.         Right eye: No discharge.         Left eye: No discharge.      Conjunctiva/sclera:      Right eye: Right conjunctiva is injected. No exudate.     Left eye: Left conjunctiva is injected. No exudate.     Pupils: Pupils are equal, round, and reactive to light.   Neck:      Thyroid: No thyroid mass or thyromegaly.      Trachea: Trachea normal. No tracheal deviation.   Cardiovascular:      Rate and Rhythm: Normal rate and regular rhythm.      Pulses: Normal pulses.      Heart sounds: Normal heart sounds.   Pulmonary:      Effort: Pulmonary effort is normal.      Breath sounds: Normal breath sounds. No stridor. No decreased breath sounds, wheezing, rhonchi or rales.   Abdominal:      General: Bowel sounds are normal.      Palpations: Abdomen is soft.      Tenderness: There is no abdominal tenderness.   Musculoskeletal:         General: Normal range of motion.      Cervical back: Normal range of motion. No muscular tenderness.   Lymphadenopathy:      Head:      Right side of head: No submental, submandibular, preauricular, posterior auricular or occipital adenopathy.      Left side of head: No submental, submandibular, preauricular, posterior auricular or occipital adenopathy.      Cervical: No cervical adenopathy.   Skin:     General: Skin is warm and dry.      Findings: No petechiae or rash.      Nails: There is no clubbing.   Neurological:      Mental Status: She is alert and oriented to person, place, and time.      Cranial Nerves: No cranial nerve deficit.      Sensory: No sensory deficit.      Gait: Gait normal.   Psychiatric:         Speech: Speech normal.         Behavior: Behavior normal. Behavior is cooperative.         Thought Content: Thought content normal.         Judgment: Judgment normal.         I personally reviewed the above audiogram and discussed in full detail with the patient.  I answered all of her questions and  provided her with a copy of the audiogram.  Pertinent findings:  Normal pure tone speech and impedance audiometry with the exception of absent acoustic reflexes in the right ear      Assessment:       1. Pulsatile tinnitus of right ear    2. Smell or taste sensation disturbance    3. Non-seasonal allergic rhinitis, unspecified trigger    4. Nasal septal deviation            Plan:       I  will have the patient use olfactory retraining using essential oils.  I am having her take Claritin in addition to the fluticasone and will recheck her in 6 weeks.  I have explained that since this started with the upper respiratory infection and is improving that it will in all likelihood resolve.

## 2023-05-23 NOTE — TELEPHONE ENCOUNTER
Called pt in attempt to schedule, but I see no openings. Will send message to scheduling team to see if they can assist.

## 2023-05-23 NOTE — TELEPHONE ENCOUNTER
----- Message from Lynnette Arana sent at 5/23/2023  9:03 AM CDT -----  Regarding: Orders  Contact: BRETT HOOPER [[id number]]  Name of Who is Calling: .Brett Hooper              What is the request in detail:   Pt states she is calling in regards to getting scheduled for a colonoscopy. There are no orders are in system , in regards. Please advise          Can the clinic reply by [facility]:No           What Number to Call Back if not in [facility]: Home Phone      [phone]  Work Phone      Not on file.  Mobile          [phone]

## 2023-05-29 ENCOUNTER — HOSPITAL ENCOUNTER (OUTPATIENT)
Dept: RADIOLOGY | Facility: HOSPITAL | Age: 48
Discharge: HOME | End: 2023-05-29
Attending: OBSTETRICS & GYNECOLOGY
Payer: COMMERCIAL

## 2023-05-29 DIAGNOSIS — Z12.31 VISIT FOR SCREENING MAMMOGRAM: ICD-10-CM

## 2023-05-29 PROCEDURE — 77063 BREAST TOMOSYNTHESIS BI: CPT | Mod: 26,,, | Performed by: RADIOLOGY

## 2023-05-29 PROCEDURE — 77067 SCR MAMMO BI INCL CAD: CPT | Mod: TC

## 2023-05-29 PROCEDURE — 77063 MAMMO DIGITAL SCREENING BILAT WITH TOMO: ICD-10-PCS | Mod: 26,,, | Performed by: RADIOLOGY

## 2023-05-29 PROCEDURE — 77067 SCR MAMMO BI INCL CAD: CPT | Mod: 26,,, | Performed by: RADIOLOGY

## 2023-05-29 PROCEDURE — 77067 MAMMO DIGITAL SCREENING BILAT WITH TOMO: ICD-10-PCS | Mod: 26,,, | Performed by: RADIOLOGY

## 2023-07-03 ENCOUNTER — ANESTHESIA (OUTPATIENT)
Dept: ENDOSCOPY | Facility: HOSPITAL | Age: 48
End: 2023-07-03
Payer: COMMERCIAL

## 2023-07-03 ENCOUNTER — HOSPITAL ENCOUNTER (OUTPATIENT)
Facility: HOSPITAL | Age: 48
Discharge: HOME | End: 2023-07-03
Attending: INTERNAL MEDICINE | Admitting: INTERNAL MEDICINE
Payer: COMMERCIAL

## 2023-07-03 ENCOUNTER — ANESTHESIA EVENT (OUTPATIENT)
Dept: ENDOSCOPY | Facility: HOSPITAL | Age: 48
End: 2023-07-03
Payer: COMMERCIAL

## 2023-07-03 VITALS
WEIGHT: 171 LBS | TEMPERATURE: 98 F | DIASTOLIC BLOOD PRESSURE: 60 MMHG | HEIGHT: 61 IN | RESPIRATION RATE: 18 BRPM | OXYGEN SATURATION: 100 % | SYSTOLIC BLOOD PRESSURE: 104 MMHG | HEART RATE: 69 BPM | BODY MASS INDEX: 32.28 KG/M2

## 2023-07-03 DIAGNOSIS — Z12.11 COLON CANCER SCREENING: Primary | ICD-10-CM

## 2023-07-03 LAB
B-HCG UR QL: NEGATIVE
CTP QC/QA: YES

## 2023-07-03 PROCEDURE — 88305 TISSUE EXAM BY PATHOLOGIST: CPT | Performed by: PATHOLOGY

## 2023-07-03 PROCEDURE — 25000003 PHARM REV CODE 250: Performed by: NURSE ANESTHETIST, CERTIFIED REGISTERED

## 2023-07-03 PROCEDURE — 45385 COLONOSCOPY W/LESION REMOVAL: CPT | Mod: PT | Performed by: INTERNAL MEDICINE

## 2023-07-03 PROCEDURE — 37000008 HC ANESTHESIA 1ST 15 MINUTES: Performed by: INTERNAL MEDICINE

## 2023-07-03 PROCEDURE — D9220A PRA ANESTHESIA: Mod: 33,CRNA,, | Performed by: NURSE ANESTHETIST, CERTIFIED REGISTERED

## 2023-07-03 PROCEDURE — 88305 TISSUE EXAM BY PATHOLOGIST: CPT | Mod: 26,,, | Performed by: PATHOLOGY

## 2023-07-03 PROCEDURE — 45385 PR COLONOSCOPY,REMV LESN,SNARE: ICD-10-PCS | Mod: 33,,, | Performed by: INTERNAL MEDICINE

## 2023-07-03 PROCEDURE — 27201089 HC SNARE, DISP (ANY): Performed by: INTERNAL MEDICINE

## 2023-07-03 PROCEDURE — D9220A PRA ANESTHESIA: ICD-10-PCS | Mod: 33,CRNA,, | Performed by: NURSE ANESTHETIST, CERTIFIED REGISTERED

## 2023-07-03 PROCEDURE — D9220A PRA ANESTHESIA: ICD-10-PCS | Mod: 33,ANES,, | Performed by: ANESTHESIOLOGY

## 2023-07-03 PROCEDURE — 63600175 PHARM REV CODE 636 W HCPCS: Performed by: NURSE ANESTHETIST, CERTIFIED REGISTERED

## 2023-07-03 PROCEDURE — 81025 URINE PREGNANCY TEST: CPT | Performed by: INTERNAL MEDICINE

## 2023-07-03 PROCEDURE — 45385 COLONOSCOPY W/LESION REMOVAL: CPT | Mod: 33,,, | Performed by: INTERNAL MEDICINE

## 2023-07-03 PROCEDURE — 88305 TISSUE EXAM BY PATHOLOGIST: ICD-10-PCS | Mod: 26,,, | Performed by: PATHOLOGY

## 2023-07-03 PROCEDURE — 37000009 HC ANESTHESIA EA ADD 15 MINS: Performed by: INTERNAL MEDICINE

## 2023-07-03 PROCEDURE — D9220A PRA ANESTHESIA: Mod: 33,ANES,, | Performed by: ANESTHESIOLOGY

## 2023-07-03 RX ORDER — SODIUM CHLORIDE 9 MG/ML
INJECTION, SOLUTION INTRAVENOUS CONTINUOUS
Status: DISCONTINUED | OUTPATIENT
Start: 2023-07-03 | End: 2023-07-03 | Stop reason: HOSPADM

## 2023-07-03 RX ORDER — PROPOFOL 10 MG/ML
INJECTION, EMULSION INTRAVENOUS CONTINUOUS PRN
Status: DISCONTINUED | OUTPATIENT
Start: 2023-07-03 | End: 2023-07-03

## 2023-07-03 RX ORDER — SODIUM CHLORIDE 0.9 % (FLUSH) 0.9 %
3 SYRINGE (ML) INJECTION
Status: DISCONTINUED | OUTPATIENT
Start: 2023-07-03 | End: 2023-07-03 | Stop reason: HOSPADM

## 2023-07-03 RX ORDER — LIDOCAINE HYDROCHLORIDE 20 MG/ML
INJECTION, SOLUTION EPIDURAL; INFILTRATION; INTRACAUDAL; PERINEURAL
Status: DISCONTINUED | OUTPATIENT
Start: 2023-07-03 | End: 2023-07-03

## 2023-07-03 RX ORDER — PROPOFOL 10 MG/ML
VIAL (ML) INTRAVENOUS
Status: DISCONTINUED | OUTPATIENT
Start: 2023-07-03 | End: 2023-07-03

## 2023-07-03 RX ADMIN — PROPOFOL 100 MG: 10 INJECTION, EMULSION INTRAVENOUS at 10:07

## 2023-07-03 RX ADMIN — PROPOFOL 200 MCG/KG/MIN: 10 INJECTION, EMULSION INTRAVENOUS at 10:07

## 2023-07-03 RX ADMIN — SODIUM CHLORIDE: 0.9 INJECTION, SOLUTION INTRAVENOUS at 10:07

## 2023-07-03 RX ADMIN — LIDOCAINE HYDROCHLORIDE 100 MG: 20 INJECTION, SOLUTION EPIDURAL; INFILTRATION; INTRACAUDAL; PERINEURAL at 10:07

## 2023-07-03 NOTE — PROVATION PATIENT INSTRUCTIONS
Discharge Summary/Instructions after an Endoscopic Procedure  Patient Name: Liliana Wright  Patient MRN: [id number]  Patient YOB: 1975  Monday, July 3, 2023  Oscar Iyer MD  Dear patient,  As a result of recent federal legislation (The Federal Cures Act), you may   receive lab or pathology results from your procedure in your [facility]   account before your physician is able to contact you. Your physician or   their representative will relay the results to you with their   recommendations at their soonest availability.  Thank you,  RESTRICTIONS:  During your procedure today, you received medications for sedation.  These   medications may affect your judgment, balance and coordination.  Therefore,   for 24 hours, you have the following restrictions:   - DO NOT drive a car, operate machinery, make legal/financial decisions,   sign important papers or drink alcohol.    ACTIVITY:  Today: no heavy lifting, straining or running due to procedural   sedation/anesthesia.  The following day: return to full activity including work.  DIET:  Eat and drink normally unless instructed otherwise.     TREATMENT FOR COMMON SIDE EFFECTS:  - Mild abdominal pain, nausea, belching, bloating or excessive gas:  rest,   eat lightly and use a heating pad.  - Sore Throat: treat with throat lozenges and/or gargle with warm salt   water.  - Because air was used during the procedure, expelling large amounts of air   from your rectum or belching is normal.  - If a bowel prep was taken, you may not have a bowel movement for 1-3 days.    This is normal.  SYMPTOMS TO WATCH FOR AND REPORT TO YOUR PHYSICIAN:  1. Abdominal pain or bloating, other than gas cramps.  2. Chest pain.  3. Back pain.  4. Signs of infection such as: chills or fever occurring within 24 hours   after the procedure.  5. Rectal bleeding, which would show as bright red, maroon, or black stools.   (A tablespoon of blood from the rectum is not serious, especially if    hemorrhoids are present.)  6. Vomiting.  7. Weakness or dizziness.  GO DIRECTLY TO THE NEAREST EMERGENCY ROOM IF YOU HAVE ANY OF THE FOLLOWING:      Difficulty breathing              Chills and/or fever over 101 F   Persistent vomiting and/or vomiting blood   Severe abdominal pain   Severe chest pain   Black, tarry stools   Bleeding- more than one tablespoon   Any other symptom or condition that you feel may need urgent attention  Your doctor recommends these additional instructions:  If any biopsies were taken, your doctors clinic will contact you in 1 to 2   weeks with any results.  - Discharge patient to home.   - Patient has a contact number available for emergencies.  The signs and   symptoms of potential delayed complications were discussed with the   patient.  Return to normal activities tomorrow.  Written discharge   instructions were provided to the patient.   - Resume previous diet.   - Continue present medications.   - Await pathology results.   - Repeat colonoscopy in 7 years for surveillance.  For questions, problems or results please call your physician - Oscar Iyer MD at Work:  [phone].  [facility] [place], EMERGENCY ROOM PHONE NUMBER: [phone]  IF A COMPLICATION OR EMERGENCY SITUATION ARISES AND YOU ARE UNABLE TO REACH   YOUR PHYSICIAN - GO DIRECTLY TO THE EMERGENCY ROOM.  Oscar Iyer MD  7/3/2023 11:09:18 AM  This report has been verified and signed electronically.  Dear patient,  As a result of recent federal legislation (The Federal Cures Act), you may   receive lab or pathology results from your procedure in your [facility]   account before your physician is able to contact you. Your physician or   their representative will relay the results to you with their   recommendations at their soonest availability.  Thank you,  [employer]OVATION

## 2023-07-03 NOTE — H&P
Short Stay Endoscopy History and Physical      Procedure - Colonoscopy  ASA - per anesthesia  Mallampati - per anesthesia  History of Anesthesia problems - no  Family history Anesthesia problems - no   Plan of anesthesia - MAC    HPI:  This is a 48 y.o. female here for colon cancer screening.       ROS:  Constitutional: No fevers, chills  CV: No chest pain  Pulm: No cough, No shortness of breath  GI: see HPI    Medical History:  has a past medical history of Abnormal Pap smear (), Cervical high risk HPV (human papillomavirus) test positive, PCO (polycystic ovaries) (3/13/2014), Previous  section (3/13/2014), Rh negative status during pregnancy (3/14/2014), and Vegetarian (2017).    Surgical History:  has a past surgical history that includes Cholecystectomy; Ovarian cyst removal; Dilation and curettage of uterus (2014); and  section.    Family History: family history includes Breast cancer (age of onset: 70) in her maternal grandmother; Cancer in her maternal grandmother; Hypertension in her father; Lupus in her father; No Known Problems in her brother, daughter, mother, sister, and son.    Social History:  reports that she has never smoked. She has never been exposed to tobacco smoke. She has never used smokeless tobacco. She reports that she does not drink alcohol and does not use drugs.    Review of patient's allergies indicates:   Allergen Reactions    Robitussin [guaifenesin] Anaphylaxis and Palpitations    Sudafed cough     Pseudoephedrine hcl Palpitations       Medications:   Medications Prior to Admission   Medication Sig Dispense Refill Last Dose    ascorbic acid (VITAMIN C ORAL) Take by mouth.   Past Week    biotin 5,000 mcg TbDL Take by mouth once daily.   Past Week    ergocalciferol, vitamin D2, (VITAMIN D ORAL) Take by mouth.   Past Week    fluticasone propionate (FLONASE) 50 mcg/actuation nasal spray 2 sprays (100 mcg total) by Each Nostril route once daily. 18.2 mL 11  Past Week    loratadine (CLARITIN) 10 mg tablet Take 1 tablet (10 mg total) by mouth once daily. 30 tablet 11 Past Week    MAGNESIUM ORAL Take by mouth.   Past Week    POTASSIUM ORAL Take by mouth.   Past Week    psyllium seed, with sugar, (FIBER ORAL) Take by mouth.   Past Week    UNABLE TO FIND medication name: Oxygen pills   Past Week    hydrOXYzine pamoate (VISTARIL) 25 MG Cap Take 1 capsule (25 mg total) by mouth 4 (four) times daily. 20 capsule 0     triamcinolone acetonide 0.1% (KENALOG) 0.1 % cream Aaa qd- bid prn flare. Not more than 2 weeks straight in the same location. Avoid use on face and groin 30 g 3          Physical Exam:    Vital Signs: There were no vitals filed for this visit.    General Appearance: Well appearing in no acute distress  Eyes:    No scleral icterus  Lungs: CTA bilaterally  Heart:  reg rate and rhythm   Abdomen: Soft, non tender, non distended with positive bowel sounds      Labs:  Lab Results   Component Value Date    WBC 5.28 12/21/2022    HGB 12.4 12/21/2022    HCT 38.1 12/21/2022    MCV 83 12/21/2022     12/21/2022        BMP  Lab Results   Component Value Date     12/21/2022    K 3.9 12/21/2022     12/21/2022    CO2 25 12/21/2022    BUN 7 12/21/2022    CREATININE 0.8 12/21/2022    CALCIUM 9.3 12/21/2022    ANIONGAP 7 (L) 12/21/2022    ESTGFRAFRICA >60 12/01/2021    EGFRNONAA >60 12/01/2021     No results found for: INR, PROTIME       Assessment:  48 y.o. female here for average-risk colon cancer screening.     Plan:  Proceed with colonoscopy today.  I have explained the risks and benefits of endoscopy procedures to the patient including but not limited to bleeding, perforation, infection, and death.  All questions and answered.        Oscar Iyer MD

## 2023-07-03 NOTE — ANESTHESIA POSTPROCEDURE EVALUATION
Anesthesia Post Evaluation    Patient: Liliana Wright    Procedure(s) Performed: Procedure(s) (LRB):  COLONOSCOPY (N/A)    Final Anesthesia Type: general      Patient location during evaluation: PACU  Patient participation: Yes- Able to Participate  Level of consciousness: awake and alert  Post-procedure vital signs: reviewed and stable  Pain management: adequate  Airway patency: patent    PONV status at discharge: No PONV  Anesthetic complications: no      Cardiovascular status: blood pressure returned to baseline  Respiratory status: unassisted  Hydration status: euvolemic  Follow-up not needed.          Vitals Value Taken Time   BP 92/49 07/03/23 1111   Temp 36.7 °C (98.1 °F) 07/03/23 1110   Pulse 80 07/03/23 1115   Resp 14 07/03/23 1115   SpO2 96 % 07/03/23 1115   Vitals shown include unvalidated device data.      No case tracking events are documented in the log.      Pain/Gagan Score: Gagan Score: 8 (7/3/2023 11:10 AM)

## 2023-07-03 NOTE — TRANSFER OF CARE
"Anesthesia Transfer of Care Note    Patient: Liliana Wright    Procedure(s) Performed: Procedure(s) (LRB):  COLONOSCOPY (N/A)    Patient location: [facility]    Anesthesia Type: general    Transport from OR: Transported from OR on room air with adequate spontaneous ventilation    Post pain: adequate analgesia    Post assessment: no apparent anesthetic complications    Post vital signs: stable    Level of consciousness: awake and alert    Nausea/Vomiting: no nausea/vomiting    Complications: none    Transfer of care protocol was followed      Last vitals:   Visit Vitals  /78   Pulse 89   Temp 36.7 °C (98.1 °F)   Resp 17   Ht 5' 1" (1.549 m)   Wt 77.6 kg (171 lb)   SpO2 99%   Breastfeeding No   BMI 32.31 kg/m²     "

## 2023-07-03 NOTE — ANESTHESIA PREPROCEDURE EVALUATION
07/03/2023  Liilana Wright is a 48 y.o., female.      Pre-op Assessment    I have reviewed the Patient Summary Reports.          Review of Systems  Anesthesia Hx:  Low BP History of prior surgery of interest to airway management or planning: Personal Hx of Anesthesia complications   Psych:   anxiety          Physical Exam  General: Well nourished    Airway:  Mallampati: III / II  Mouth Opening: Normal  TM Distance: Normal  Tongue: Normal  Neck ROM: Normal ROM    Chest/Lungs:  Normal Respiratory Rate    Heart:  Rate: Normal        Anesthesia Plan  Type of Anesthesia, risks & benefits discussed:    Anesthesia Type: Gen Natural Airway  Intra-op Monitoring Plan: Standard ASA Monitors  Induction:  IV  Informed Consent: Informed consent signed with the Patient and all parties understand the risks and agree with anesthesia plan.  All questions answered.   ASA Score: 2  Day of Surgery Review of History & Physical: H&P Update referred to the surgeon/provider.  Anesthesia Plan Notes: NPO confirmed.   Hypotension postop with previous anesthesia, simply delayed PACU discharge, no escalation of care.     Ready For Surgery From Anesthesia Perspective.     .

## 2023-07-06 LAB
FINAL PATHOLOGIC DIAGNOSIS: NORMAL
GROSS: NORMAL
Lab: NORMAL

## 2023-08-29 ENCOUNTER — PATIENT MESSAGE (OUTPATIENT)
Dept: INTERNAL MEDICINE | Facility: CLINIC | Age: 48
End: 2023-08-29
Payer: COMMERCIAL

## 2023-09-01 ENCOUNTER — OFFICE VISIT (OUTPATIENT)
Dept: INTERNAL MEDICINE | Facility: CLINIC | Age: 48
End: 2023-09-01
Attending: INTERNAL MEDICINE
Payer: COMMERCIAL

## 2023-09-01 VITALS
DIASTOLIC BLOOD PRESSURE: 80 MMHG | SYSTOLIC BLOOD PRESSURE: 118 MMHG | HEART RATE: 101 BPM | HEIGHT: 61 IN | BODY MASS INDEX: 33.51 KG/M2 | OXYGEN SATURATION: 97 % | WEIGHT: 177.5 LBS

## 2023-09-01 DIAGNOSIS — L01.00 IMPETIGO: Primary | ICD-10-CM

## 2023-09-01 DIAGNOSIS — R59.0 CERVICAL ADENOPATHY: ICD-10-CM

## 2023-09-01 PROCEDURE — 99214 OFFICE O/P EST MOD 30 MIN: CPT | Mod: S$GLB,,, | Performed by: INTERNAL MEDICINE

## 2023-09-01 PROCEDURE — 99999 PR PBB SHADOW E&M-EST. PATIENT-LVL IV: ICD-10-PCS | Mod: PBBFAC,,, | Performed by: INTERNAL MEDICINE

## 2023-09-01 PROCEDURE — 99999 PR PBB SHADOW E&M-EST. PATIENT-LVL IV: CPT | Mod: PBBFAC,,, | Performed by: INTERNAL MEDICINE

## 2023-09-01 PROCEDURE — 99214 PR OFFICE/OUTPT VISIT, EST, LEVL IV, 30-39 MIN: ICD-10-PCS | Mod: S$GLB,,, | Performed by: INTERNAL MEDICINE

## 2023-09-01 RX ORDER — DOXYCYCLINE HYCLATE 100 MG
100 TABLET ORAL 2 TIMES DAILY
Qty: 20 TABLET | Refills: 0 | Status: SHIPPED | OUTPATIENT
Start: 2023-09-01 | End: 2023-09-11

## 2023-09-01 NOTE — PROGRESS NOTES
INTERNAL MEDICINE CLINIC - SAME DAY APPOINTMENT  Progress Note    PRESENTING HISTORY     PCP: Jenna Scott MD    Chief Complaint/Reason for Visit:     Chief Complaint   Patient presents with    Jaw Pain     Cassandra jaw pain Feels/lymphnodes on cassandra jaw and rash in face      History of Present Illness & ROS : Ms. Liliana Wright is a 48 y.o. female.      Started with lymph node pain submandibular on Monday night.  Then developed rash on face.  No fever.     Has chronic right ear issues.    PAST HISTORY:     Past Medical History:   Diagnosis Date    Abnormal Pap smear     GEGE I on bx     Cervical high risk HPV (human papillomavirus) test positive     PCO (polycystic ovaries) 3/13/2014    Took Metformin during first two pregnancies, stopped before conception this pregnancy and does not want to take     Previous  section 3/13/2014    X 2, induction and FTP, 2nd preg PROM at 37wk with repeat     Rh negative status during pregnancy 3/14/2014    Rhogam at 28wk     Vegetarian 2017       Past Surgical History:   Procedure Laterality Date     SECTION      X 2    CHOLECYSTECTOMY      COLONOSCOPY N/A 7/3/2023    Procedure: COLONOSCOPY;  Surgeon: Oscar Iyer MD;  Location: [facility] ENDO (2ND FLR);  Service: Endoscopy;  Laterality: N/A;  from referral DR. Scott / prep ins. on portal, Pt requested suprep/ 2nd flr Pt reports possible anesthesia complications - admitted post procedure due to hypotension  - ERW  23- Precall confirmed- [place]    DILATION AND CURETTAGE OF UTERUS  2014    missed AB     OVARIAN CYST REMOVAL      with c/s        Family History   Problem Relation Age of Onset    Lupus Father     Hypertension Father     No Known Problems Mother     Breast cancer Maternal Grandmother 70    Cancer Maternal Grandmother     No Known Problems Sister     No Known Problems Brother     No Known Problems Daughter     No Known Problems Son     Alopecia Neg Hx     Diabetes Neg Hx     Hypertension Neg  Hx     Heart disease Neg Hx     Stroke Neg Hx     Acne Neg Hx     Eczema Neg Hx     Psoriasis Neg Hx     Melanoma Neg Hx     Ovarian cancer Neg Hx     Colon cancer Neg Hx        Social History     Socioeconomic History    Marital status:    Occupational History     Employer: [employer]   Tobacco Use    Smoking status: Never     Passive exposure: Never    Smokeless tobacco: Never   Substance and Sexual Activity    Alcohol use: No    Drug use: No    Sexual activity: Yes     Partners: Male     Birth control/protection: None     Comment:  to [employer]    Other Topics Concern    Are you pregnant or think you may be? No    Breast-feeding No   Social History Narrative    From [place]    Moved to [facility] [place] in 1991     Social Determinants of Health     Transportation Needs: No Transportation Needs (9/22/2020)    PRAPARE - Transportation     Lack of Transportation (Medical): No     Lack of Transportation (Non-Medical): No   Physical Activity: Unknown (9/22/2020)    Exercise Vital Sign     Days of Exercise per Week: 1 day   Stress: No Stress Concern Present (9/22/2020)    [place] [place] [employer] - Occupational Stress Questionnaire     Feeling of Stress : Only a little   Social Connections: Unknown (9/22/2020)    Social Connection and Isolation Panel [NHANES]     Frequency of Communication with Friends and Family: More than three times a week     Frequency of Social Gatherings with Friends and Family: Patient refused     Active Member of Clubs or Organizations: No     Attends Club or Organization Meetings: Never     Marital Status:        MEDICATIONS & ALLERGIES:     Current Outpatient Medications on File Prior to Visit   Medication Sig Dispense Refill    ascorbic acid (VITAMIN C ORAL) Take by mouth.      biotin 5,000 mcg TbDL Take by mouth once daily.      ergocalciferol, vitamin D2, (VITAMIN D ORAL) Take by mouth.      fluticasone propionate (FLONASE) 50 mcg/actuation nasal spray 2 sprays  (100 mcg total) by Each Nostril route once daily. 18.2 mL 11    hydrOXYzine pamoate (VISTARIL) 25 MG Cap Take 1 capsule (25 mg total) by mouth 4 (four) times daily. 20 capsule 0    loratadine (CLARITIN) 10 mg tablet Take 1 tablet (10 mg total) by mouth once daily. 30 tablet 11    MAGNESIUM ORAL Take by mouth.      POTASSIUM ORAL Take by mouth.      psyllium seed, with sugar, (FIBER ORAL) Take by mouth.      triamcinolone acetonide 0.1% (KENALOG) 0.1 % cream Aaa qd- bid prn flare. Not more than 2 weeks straight in the same location. Avoid use on face and groin 30 g 3    UNABLE TO FIND medication name: Oxygen pills       No current facility-administered medications on file prior to visit.        Review of patient's allergies indicates:   Allergen Reactions    Robitussin [guaifenesin] Anaphylaxis and Palpitations    Sudafed cough     Pseudoephedrine hcl Palpitations       Medications Reconciliation:   I have reconciled the patient's home medications with the patient/family. I have updated all changes.  Refer to After-Visit Medication List.    OBJECTIVE:     Vital Signs:  Vitals:    09/01/23 1315   BP: 118/80   Pulse: 101     Wt Readings from Last 3 Encounters:   09/01/23 1315 80.5 kg (177 lb 7.5 oz)   07/03/23 1020 77.6 kg (171 lb)   05/23/23 1517 77.6 kg (171 lb)     Body mass index is 33.53 kg/m².     Physical Exam:  General: Well developed, well nourished. No distress.  HEENT: Head is normocephalic, atraumatic; ears are normal.    Eyes: Clear conjunctiva.  Neck: Supple, symmetrical neck; trachea midline.  Lungs: Clear to auscultation bilaterally and normal respiratory effort.  Cardiovascular: Heart with regular rate and rhythm.    Abdomen: Abdomen is soft, non-tender non-distended with normal bowel sounds.  Skin: Skin color, texture, turgor normal. No rashes.  Musculoskeletal: Normal gait.   Lymph Nodes: tender lymph nodes bilateral submandibular area (< 1 cm)  Psychiatric: Normal affect.  Alert.          Laboratory  Lab Results   Component Value Date    WBC 5.28 12/21/2022    HGB 12.4 12/21/2022    HCT 38.1 12/21/2022     12/21/2022    CHOL 191 12/21/2022    TRIG 102 12/21/2022    HDL 37 (L) 12/21/2022    ALT 42 12/28/2022    AST 21 12/28/2022     12/21/2022    K 3.9 12/21/2022     12/21/2022    CREATININE 0.8 12/21/2022    BUN 7 12/21/2022    CO2 25 12/21/2022    TSH 0.676 12/21/2022    HGBA1C 5.1 01/14/2017       ASSESSMENT & PLAN:     Impetigo  Cervical adenopathy  -     doxycycline (VIBRA-TABS) 100 MG tablet; Take 1 tablet (100 mg total) by mouth 2 (two) times daily. for 10 days  Dispense: 20 tablet; Refill: 0      After Visit Medication List :     Medication List            Accurate as of September 1, 2023  1:52 PM. If you have any questions, ask your nurse or doctor.                START taking these medications      doxycycline 100 MG tablet  Commonly known as: VIBRA-TABS  Take 1 tablet (100 mg total) by mouth 2 (two) times daily. for 10 days  Started by: Junito Marrufo MD            CONTINUE taking these medications      biotin 5,000 mcg Tbdl     FIBER ORAL     fluticasone propionate 50 mcg/actuation nasal spray  Commonly known as: FLONASE  2 sprays (100 mcg total) by Each Nostril route once daily.     hydrOXYzine pamoate 25 MG Cap  Commonly known as: VistariL  Take 1 capsule (25 mg total) by mouth 4 (four) times daily.     loratadine 10 mg tablet  Commonly known as: CLARITIN  Take 1 tablet (10 mg total) by mouth once daily.     MAGNESIUM ORAL     POTASSIUM ORAL     triamcinolone acetonide 0.1% 0.1 % cream  Commonly known as: KENALOG  Aaa qd- bid prn flare. Not more than 2 weeks straight in the same location. Avoid use on face and groin     UNABLE TO FIND     VITAMIN C ORAL     VITAMIN D ORAL               Where to Get Your Medications        These medications were sent to [id number] - [place] - [address],  [place] [address]      Phone: [phone]   doxycycline 100 MG tablet         Signing Physician:  Junito Marrufo MD

## 2023-09-12 ENCOUNTER — PATIENT MESSAGE (OUTPATIENT)
Dept: OBSTETRICS AND GYNECOLOGY | Facility: CLINIC | Age: 48
End: 2023-09-12
Payer: COMMERCIAL

## 2023-10-24 ENCOUNTER — TELEPHONE (OUTPATIENT)
Dept: OBSTETRICS AND GYNECOLOGY | Facility: CLINIC | Age: 48
End: 2023-10-24
Payer: COMMERCIAL

## 2023-10-24 ENCOUNTER — PATIENT MESSAGE (OUTPATIENT)
Dept: OBSTETRICS AND GYNECOLOGY | Facility: CLINIC | Age: 48
End: 2023-10-24
Payer: COMMERCIAL

## 2023-10-24 DIAGNOSIS — N64.52 NIPPLE DISCHARGE: Primary | ICD-10-CM

## 2023-10-24 DIAGNOSIS — N64.52 DISCHARGE FROM BOTH NIPPLES: Primary | ICD-10-CM

## 2023-10-24 NOTE — TELEPHONE ENCOUNTER
Please schedule pt to be seen in the breast center for bilateral nipple discharge. Last pregnancy 11 yrs ago

## 2023-10-24 NOTE — TELEPHONE ENCOUNTER
Per Dr. Jade prolactin blood work ordered and scheduled. Patient also informed referral to breast center was placed.

## 2023-11-06 ENCOUNTER — PATIENT MESSAGE (OUTPATIENT)
Dept: INTERNAL MEDICINE | Facility: CLINIC | Age: 48
End: 2023-11-06
Payer: COMMERCIAL

## 2024-01-02 ENCOUNTER — PATIENT MESSAGE (OUTPATIENT)
Dept: INTERNAL MEDICINE | Facility: CLINIC | Age: 49
End: 2024-01-02
Payer: COMMERCIAL

## 2024-01-02 DIAGNOSIS — R79.82 ELEVATED C-REACTIVE PROTEIN (CRP): ICD-10-CM

## 2024-01-02 DIAGNOSIS — R70.0 ELEVATED SED RATE: ICD-10-CM

## 2024-01-02 DIAGNOSIS — Z00.00 ANNUAL PHYSICAL EXAM: Primary | ICD-10-CM

## 2024-01-08 ENCOUNTER — LAB VISIT (OUTPATIENT)
Dept: LAB | Facility: HOSPITAL | Age: 49
End: 2024-01-08
Attending: INTERNAL MEDICINE
Payer: COMMERCIAL

## 2024-01-08 DIAGNOSIS — R70.0 ELEVATED SED RATE: ICD-10-CM

## 2024-01-08 DIAGNOSIS — R79.82 ELEVATED C-REACTIVE PROTEIN (CRP): ICD-10-CM

## 2024-01-08 DIAGNOSIS — Z00.00 ANNUAL PHYSICAL EXAM: ICD-10-CM

## 2024-01-08 LAB
ALBUMIN SERPL BCP-MCNC: 3.9 G/DL (ref 3.5–5.2)
ALP SERPL-CCNC: 77 U/L (ref 55–135)
ALT SERPL W/O P-5'-P-CCNC: 22 U/L (ref 10–44)
ANION GAP SERPL CALC-SCNC: 10 MMOL/L (ref 8–16)
AST SERPL-CCNC: 14 U/L (ref 10–40)
BASOPHILS # BLD AUTO: 0.03 K/UL (ref 0–0.2)
BASOPHILS NFR BLD: 0.5 % (ref 0–1.9)
BILIRUB SERPL-MCNC: 0.4 MG/DL (ref 0.1–1)
BUN SERPL-MCNC: 10 MG/DL (ref 6–20)
CALCIUM SERPL-MCNC: 9.3 MG/DL (ref 8.7–10.5)
CHLORIDE SERPL-SCNC: 106 MMOL/L (ref 95–110)
CHOLEST SERPL-MCNC: 208 MG/DL (ref 120–199)
CHOLEST/HDLC SERPL: 5.5 {RATIO} (ref 2–5)
CO2 SERPL-SCNC: 23 MMOL/L (ref 23–29)
CREAT SERPL-MCNC: 0.8 MG/DL (ref 0.5–1.4)
CRP SERPL-MCNC: 4 MG/L (ref 0–8.2)
DIFFERENTIAL METHOD BLD: ABNORMAL
EOSINOPHIL # BLD AUTO: 0 K/UL (ref 0–0.5)
EOSINOPHIL NFR BLD: 0.5 % (ref 0–8)
ERYTHROCYTE [DISTWIDTH] IN BLOOD BY AUTOMATED COUNT: 13.4 % (ref 11.5–14.5)
ERYTHROCYTE [SEDIMENTATION RATE] IN BLOOD BY WESTERGREN METHOD: 32 MM/HR (ref 0–20)
EST. GFR  (NO RACE VARIABLE): >60 ML/MIN/1.73 M^2
ESTIMATED AVG GLUCOSE: 105 MG/DL (ref 68–131)
GLUCOSE SERPL-MCNC: 97 MG/DL (ref 70–110)
HBA1C MFR BLD: 5.3 % (ref 4–5.6)
HCT VFR BLD AUTO: 40.4 % (ref 37–48.5)
HDLC SERPL-MCNC: 38 MG/DL (ref 40–75)
HDLC SERPL: 18.3 % (ref 20–50)
HGB BLD-MCNC: 12.5 G/DL (ref 12–16)
IMM GRANULOCYTES # BLD AUTO: 0.01 K/UL (ref 0–0.04)
IMM GRANULOCYTES NFR BLD AUTO: 0.2 % (ref 0–0.5)
LDLC SERPL CALC-MCNC: 129.6 MG/DL (ref 63–159)
LYMPHOCYTES # BLD AUTO: 2.6 K/UL (ref 1–4.8)
LYMPHOCYTES NFR BLD: 40.9 % (ref 18–48)
MCH RBC QN AUTO: 26.3 PG (ref 27–31)
MCHC RBC AUTO-ENTMCNC: 30.9 G/DL (ref 32–36)
MCV RBC AUTO: 85 FL (ref 82–98)
MONOCYTES # BLD AUTO: 0.3 K/UL (ref 0.3–1)
MONOCYTES NFR BLD: 3.9 % (ref 4–15)
NEUTROPHILS # BLD AUTO: 3.5 K/UL (ref 1.8–7.7)
NEUTROPHILS NFR BLD: 54 % (ref 38–73)
NONHDLC SERPL-MCNC: 170 MG/DL
NRBC BLD-RTO: 0 /100 WBC
PLATELET # BLD AUTO: 280 K/UL (ref 150–450)
PMV BLD AUTO: 10.4 FL (ref 9.2–12.9)
POTASSIUM SERPL-SCNC: 3.9 MMOL/L (ref 3.5–5.1)
PROT SERPL-MCNC: 8 G/DL (ref 6–8.4)
RBC # BLD AUTO: 4.75 M/UL (ref 4–5.4)
SODIUM SERPL-SCNC: 139 MMOL/L (ref 136–145)
TRIGL SERPL-MCNC: 202 MG/DL (ref 30–150)
TSH SERPL DL<=0.005 MIU/L-ACNC: 1.66 UIU/ML (ref 0.4–4)
WBC # BLD AUTO: 6.41 K/UL (ref 3.9–12.7)

## 2024-01-08 PROCEDURE — 85652 RBC SED RATE AUTOMATED: CPT | Performed by: INTERNAL MEDICINE

## 2024-01-08 PROCEDURE — 83036 HEMOGLOBIN GLYCOSYLATED A1C: CPT | Performed by: INTERNAL MEDICINE

## 2024-01-08 PROCEDURE — 80053 COMPREHEN METABOLIC PANEL: CPT | Performed by: INTERNAL MEDICINE

## 2024-01-08 PROCEDURE — 86140 C-REACTIVE PROTEIN: CPT | Performed by: INTERNAL MEDICINE

## 2024-01-08 PROCEDURE — 85025 COMPLETE CBC W/AUTO DIFF WBC: CPT | Performed by: INTERNAL MEDICINE

## 2024-01-08 PROCEDURE — 80061 LIPID PANEL: CPT | Performed by: INTERNAL MEDICINE

## 2024-01-08 PROCEDURE — 84443 ASSAY THYROID STIM HORMONE: CPT | Performed by: INTERNAL MEDICINE

## 2024-01-08 PROCEDURE — 36415 COLL VENOUS BLD VENIPUNCTURE: CPT | Performed by: INTERNAL MEDICINE

## 2024-01-12 ENCOUNTER — TELEPHONE (OUTPATIENT)
Dept: INTERNAL MEDICINE | Facility: CLINIC | Age: 49
End: 2024-01-12
Payer: COMMERCIAL

## 2024-01-12 ENCOUNTER — OFFICE VISIT (OUTPATIENT)
Dept: INTERNAL MEDICINE | Facility: CLINIC | Age: 49
End: 2024-01-12
Attending: INTERNAL MEDICINE
Payer: COMMERCIAL

## 2024-01-12 VITALS
WEIGHT: 174.19 LBS | HEART RATE: 93 BPM | BODY MASS INDEX: 32.89 KG/M2 | HEIGHT: 61 IN | SYSTOLIC BLOOD PRESSURE: 127 MMHG | OXYGEN SATURATION: 99 % | DIASTOLIC BLOOD PRESSURE: 77 MMHG

## 2024-01-12 DIAGNOSIS — E55.9 VITAMIN D DEFICIENCY: ICD-10-CM

## 2024-01-12 DIAGNOSIS — Z00.00 ANNUAL PHYSICAL EXAM: Primary | ICD-10-CM

## 2024-01-12 DIAGNOSIS — F41.9 ANXIETY: ICD-10-CM

## 2024-01-12 DIAGNOSIS — L94.0 MORPHEA: ICD-10-CM

## 2024-01-12 DIAGNOSIS — E66.9 OBESITY (BMI 30.0-34.9): ICD-10-CM

## 2024-01-12 PROCEDURE — 99396 PREV VISIT EST AGE 40-64: CPT | Mod: S$GLB,,, | Performed by: INTERNAL MEDICINE

## 2024-01-12 PROCEDURE — 99999 PR PBB SHADOW E&M-EST. PATIENT-LVL IV: CPT | Mod: PBBFAC,,, | Performed by: INTERNAL MEDICINE

## 2024-01-12 NOTE — TELEPHONE ENCOUNTER
Informed pt of 15 minute judd period and that I'll check with provider if she can be seen upon arrival time.

## 2024-01-12 NOTE — TELEPHONE ENCOUNTER
----- Message from Patricia Bellamy sent at 1/12/2024  7:59 AM CST -----  Regarding: LATE  Name of Who is Calling: BRETT HOOPER [[id number]]            What is the request in detail: Patient notifying they are running late but on the way; call if any changes occurs to appointment                Can the clinic reply by [facility]: no              What Number to Call Back if not in [facility]: [phone]

## 2024-01-12 NOTE — PROGRESS NOTES
"Subjective:   Patient ID: Liliana Wright is a 48 y.o. 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     Completed courses and to sit for LSAT at a later date - in future applying for [employer]     Reviewed labs with her today during appt  Not getting reg exercise     Obesity:   - stable   Prev:   Lost about 8 pounds over past year with lifestyle changes  Hydrating   Taking fiber suppl in am   Cooking most of her own food   Inc walking  Fasting at times    Vit d def:  - taking otc vit d daily at this time - stopped for a moment  - level improved with supplementaiton    RICHY - resolved:   - No heavy periods - not taking oral iron - stable on prior labs and anemia resolved     At [facility] reported episode of hand tumbness when sleeping  - only one occurrence since [facility] - discussed conservative mgmt for suspected carpal tunnel - if worsens will let me know and will eval further   Previously:   - will wake her up  - no neck pain   - discussed further eval of hand numbness and pt would like conservative mgmt at this time     Morphea:   followed by derm   Stable     HM:  Flu vaccine   Covid booster     Cscope  7/2023 - tubular adenoma - rec repeat sooner at 5y instead of 7 as in chart     Gyn: Yasmany  Derm: juan    Review of Systems    Objective:  Vitals:    01/12/24 0858 01/12/24 1005   BP: (!) 124/90 127/77   BP Location: Left arm    Patient Position: Sitting    Pulse: 93    SpO2: 99%    Weight: 79 kg (174 lb 2.6 oz)    Height: 5' 1" (1.549 m)      Body mass index is 32.91 kg/m².    Physical Exam  Vitals reviewed.   Constitutional:       Appearance: Normal appearance. She is well-developed.   HENT:      Head: Normocephalic and atraumatic.      Right Ear: Tympanic membrane, ear canal and external ear normal.      Left Ear: Tympanic membrane, ear canal and external ear normal.      Nose: Nose normal. No " congestion.      Mouth/Throat:      Mouth: Mucous membranes are moist.      Pharynx: Oropharynx is clear. No oropharyngeal exudate.   Eyes:      Extraocular Movements: Extraocular movements intact.      Conjunctiva/sclera: Conjunctivae normal.   Neck:      Thyroid: No thyromegaly.   Cardiovascular:      Rate and Rhythm: Normal rate and regular rhythm.      Pulses: Normal pulses.      Heart sounds: Normal heart sounds.   Pulmonary:      Effort: Pulmonary effort is normal.      Breath sounds: Normal breath sounds.   Abdominal:      General: Bowel sounds are normal.      Palpations: Abdomen is soft.   Musculoskeletal:         General: No swelling or tenderness.      Cervical back: Neck supple.   Lymphadenopathy:      Cervical: No cervical adenopathy.   Skin:     General: Skin is warm and dry.      Capillary Refill: Capillary refill takes less than 2 seconds.   Neurological:      General: No focal deficit present.      Mental Status: She is alert and oriented to person, place, and time. Mental status is at baseline.   Psychiatric:         Behavior: Behavior normal.         Thought Content: Thought content normal.     Assessment:  1. Annual physical exam    2. Vitamin D deficiency    3. Obesity (BMI 30.0-34.9)    4. Morphea    5. Anxiety      Plan:  Liliana was seen today for annual exam.    Diagnoses and all orders for this visit:    Annual physical exam  Utd on labs   Recommend daily sunscreen, cardiovascular exercise min 30 min 5 days per week. Seatbelts routinely.    Vitamin D deficiency  Stable   Cont supplement    Obesity (BMI 30.0-34.9)  - cont diet and exercise  - increase intensity and duration of CV exercise to continue weight loss  - goal wt loss one pound per week  - portion control, healthy choices    Morphea  Stable   Followed by derm     Anxiety  Managing with LS changes - graded exercise and good sleep hygiene rec   Discussed role of med and defers for now - if reconsiders can consider trial of  prozac    F/u with derm and gyn as planned     Health Maintenance   Topic Date Due    Mammogram  05/29/2024    Lipid Panel  01/08/2029    TETANUS VACCINE  03/01/2029    Colorectal Cancer Screening  07/03/2030    Hepatitis C Screening  Completed

## 2024-07-29 ENCOUNTER — PATIENT MESSAGE (OUTPATIENT)
Dept: DERMATOLOGY | Facility: CLINIC | Age: 49
End: 2024-07-29
Payer: COMMERCIAL

## 2024-08-12 ENCOUNTER — HOSPITAL ENCOUNTER (OUTPATIENT)
Dept: RADIOLOGY | Facility: HOSPITAL | Age: 49
Discharge: HOME | End: 2024-08-12
Attending: INTERNAL MEDICINE
Payer: COMMERCIAL

## 2024-08-12 DIAGNOSIS — Z12.31 ENCOUNTER FOR SCREENING MAMMOGRAM FOR BREAST CANCER: ICD-10-CM

## 2024-08-12 PROCEDURE — 77067 SCR MAMMO BI INCL CAD: CPT | Mod: TC

## 2024-08-27 ENCOUNTER — PATIENT MESSAGE (OUTPATIENT)
Dept: OBSTETRICS AND GYNECOLOGY | Facility: CLINIC | Age: 49
End: 2024-08-27
Payer: COMMERCIAL

## 2024-08-27 ENCOUNTER — TELEPHONE (OUTPATIENT)
Dept: OBSTETRICS AND GYNECOLOGY | Facility: CLINIC | Age: 49
End: 2024-08-27
Payer: COMMERCIAL

## 2024-08-27 DIAGNOSIS — R10.32 LEFT LOWER QUADRANT PAIN: Primary | ICD-10-CM

## 2024-08-28 ENCOUNTER — HOSPITAL ENCOUNTER (OUTPATIENT)
Dept: RADIOLOGY | Facility: HOSPITAL | Age: 49
Discharge: HOME | End: 2024-08-28
Attending: OBSTETRICS & GYNECOLOGY
Payer: COMMERCIAL

## 2024-08-28 DIAGNOSIS — R10.32 LEFT LOWER QUADRANT PAIN: ICD-10-CM

## 2024-08-28 PROCEDURE — 76830 TRANSVAGINAL US NON-OB: CPT | Mod: 26,,, | Performed by: INTERNAL MEDICINE

## 2024-08-28 PROCEDURE — 76830 TRANSVAGINAL US NON-OB: CPT | Mod: TC

## 2024-08-28 PROCEDURE — 76856 US EXAM PELVIC COMPLETE: CPT | Mod: 26,,, | Performed by: INTERNAL MEDICINE

## 2024-09-06 ENCOUNTER — TELEPHONE (OUTPATIENT)
Dept: OBSTETRICS AND GYNECOLOGY | Facility: CLINIC | Age: 49
End: 2024-09-06
Payer: COMMERCIAL

## 2024-09-06 NOTE — TELEPHONE ENCOUNTER
----- Message from Ronaldo Jade IV, MD sent at 9/5/2024  4:05 PM CDT -----  Pelvic sonogram report reviewed.  No acute abnormality is visualized (normal pelvic sonogram).  Contact patient with the above findings.    Ronaldo Jade IV, MD

## 2024-10-01 ENCOUNTER — OFFICE VISIT (OUTPATIENT)
Dept: OBSTETRICS AND GYNECOLOGY | Facility: CLINIC | Age: 49
End: 2024-10-01
Payer: COMMERCIAL

## 2024-10-01 VITALS
BODY MASS INDEX: 30.17 KG/M2 | HEIGHT: 61 IN | DIASTOLIC BLOOD PRESSURE: 82 MMHG | WEIGHT: 159.81 LBS | SYSTOLIC BLOOD PRESSURE: 110 MMHG

## 2024-10-01 DIAGNOSIS — Z12.31 BREAST CANCER SCREENING BY MAMMOGRAM: ICD-10-CM

## 2024-10-01 DIAGNOSIS — Z12.4 PAP SMEAR FOR CERVICAL CANCER SCREENING: ICD-10-CM

## 2024-10-01 DIAGNOSIS — Z01.419 ENCOUNTER FOR WELL WOMAN EXAM: Primary | ICD-10-CM

## 2024-10-01 PROCEDURE — 99396 PREV VISIT EST AGE 40-64: CPT | Mod: S$GLB,,, | Performed by: FAMILY MEDICINE

## 2024-10-01 PROCEDURE — 99999 PR PBB SHADOW E&M-EST. PATIENT-LVL III: CPT | Mod: PBBFAC,,, | Performed by: FAMILY MEDICINE

## 2024-10-01 NOTE — PROGRESS NOTES
HISTORY OF PRESENT ILLNESS:    Liliana Wright is a 49 y.o. female, , Patient's last menstrual period was 2024 (exact date).,  presents for a routine annual exam .   Last pap:   NL   Last mammogram: 2024 NL TC 12%  Last colon ca screenin  return in 7 yrs  SA: male partner, does not use condoms  Contraception: none.    Sexually transmitted infection risk: very low risk of STD exposure.   Menstrual flow: regular every 28-30 days usually- skipped cycle in August but was very stressed. No consistent hot flashes. No breast pain uti sx abnormal vd. Left pelvic pain that she had last month resolved..  This is the extent of the patient's complaints at this time.     Past Medical History:   Diagnosis Date    Abnormal Pap smear     GEGE I on bx     Cervical high risk HPV (human papillomavirus) test positive     PCO (polycystic ovaries) 3/13/2014    Took Metformin during first two pregnancies, stopped before conception this pregnancy and does not want to take     Previous  section 3/13/2014    X 2, induction and FTP, 2nd preg PROM at 37wk with repeat     Rh negative status during pregnancy 3/14/2014    Rhogam at 28wk     Vegetarian 2017       Past Surgical History:   Procedure Laterality Date     SECTION      X 2    CHOLECYSTECTOMY      COLONOSCOPY N/A 7/3/2023    Procedure: COLONOSCOPY;  Surgeon: Oscar Iyer MD;  Location: [facility] (2ND FLR);  Service: Endoscopy;  Laterality: N/A;  from referral DR. Scott / prep ins. on portal, Pt requested suprep/ 2nd flr Pt reports possible anesthesia complications - admitted post procedure due to hypotension  - ERW  23- Precall confirmed- [place]    DILATION AND CURETTAGE OF UTERUS  2014    missed AB     OVARIAN CYST REMOVAL      with c/s        MEDICATIONS AND ALLERGIES:      Current Outpatient Medications:     MAGNESIUM ORAL, Take by mouth., Disp: , Rfl:     ascorbic acid (VITAMIN C ORAL), Take by mouth. (Patient not taking:  Reported on 10/1/2024), Disp: , Rfl:     biotin 5,000 mcg TbDL, Take by mouth once daily. (Patient not taking: Reported on 10/1/2024), Disp: , Rfl:     ergocalciferol, vitamin D2, (VITAMIN D ORAL), Take by mouth. (Patient not taking: Reported on 10/1/2024), Disp: , Rfl:     ZINC ORAL, Take by mouth. (Patient not taking: Reported on 10/1/2024), Disp: , Rfl:     Review of patient's allergies indicates:   Allergen Reactions    Robitussin [guaifenesin] Anaphylaxis and Palpitations    Sudafed cough     Pseudoephedrine hcl Palpitations       Family History   Problem Relation Name Age of Onset    No Known Problems Mother      Lupus Father No     Hypertension Father No     No Known Problems Sister      No Known Problems Brother      No Known Problems Daughter      No Known Problems Son      Colon cancer Paternal Aunt      Breast cancer Maternal Grandmother No 70    Cancer Maternal Grandmother No     Alopecia Neg Hx      Diabetes Neg Hx      Hypertension Neg Hx      Heart disease Neg Hx      Stroke Neg Hx      Acne Neg Hx      Eczema Neg Hx      Psoriasis Neg Hx      Melanoma Neg Hx      Ovarian cancer Neg Hx         Social History     Socioeconomic History    Marital status:    Occupational History     Employer: [employer]   Tobacco Use    Smoking status: Never     Passive exposure: Never    Smokeless tobacco: Never   Substance and Sexual Activity    Alcohol use: No    Drug use: No    Sexual activity: Yes     Partners: Male     Birth control/protection: None     Comment:  to [employer]    Other Topics Concern    Are you pregnant or think you may be? No    Breast-feeding No   Social History Narrative    From [place]    Moved to [facility] in 1991     Social Drivers of Health     Transportation Needs: No Transportation Needs (9/22/2020)    PRAPARE - Transportation     Lack of Transportation (Medical): No     Lack of Transportation (Non-Medical): No   Physical Activity: Unknown (9/22/2020)    Exercise Vital Sign      "Days of Exercise per Week: 1 day   Stress: No Stress Concern Present (2020)    [place] [employer] - Occupational Stress Questionnaire     Feeling of Stress : Only a little       OB History    Para Term  AB Living   4 2 1 1 2 2   SAB IAB Ectopic Multiple Live Births   2       2      # Outcome Date GA Lbr Josue/2nd Weight Sex Type Anes PTL Lv   4 SAB 10/31/14           3 SAB 14              Birth Comments: missed AB, D&C 2014   2 Term 12/18/10 37w0d  2.722 kg (6 lb) M CS-LTranv Spinal N ANGELITA      Birth Comments: PROM at 37wk, repeat C/S   1  08   3.175 kg (7 lb) F CS-LTranv EPI N ANGELITA      Birth Comments: induction, FTP          COMPREHENSIVE GYN HISTORY:  PAP History: + abnormal Paps (HPV ).  Infection History: Denies STDs. Denies PID.  Benign History: Denies uterine fibroids. + ovarian cysts. Denies endometriosis. Denies other conditions.  Cancer History: Denies cervical cancer. Denies uterine cancer or hyperplasia. Denies ovarian cancer. Denies vulvar cancer or pre-cancer. Denies vaginal cancer or pre-cancer. Denies breast cancer. Denies colon cancer.      ROS:  GENERAL: No weight changes. No swelling. No fatigue. No fever.  BREASTS: No pain. No lumps. No discharge.  ABDOMEN: No pain. No nausea. No vomiting. No diarrhea. No constipation.  REPRODUCTIVE: + abnormal bleeding- skipped menses last month. No pelvic pain.   VULVA: No pain. No lesions. No itching.  VAGINA: No relaxation. No itching. No odor. No discharge. No lesions.  URINARY: No incontinence. No nocturia. No frequency. No dysuria.    /82   Ht 5' 0.98" (1.549 m)   Wt 72.5 kg (159 lb 13.3 oz)   LMP 2024 (Exact Date)   BMI 30.22 kg/m²     PE:  Physical Exam:   Constitutional: She is oriented to person, place, and time. She appears well-developed and well-nourished. No distress.      Neck: No thyroid mass and no thyromegaly present.     Pulmonary/Chest: Right breast exhibits no " inverted nipple, no mass, no nipple discharge, no skin change, no tenderness and no bleeding. Left breast exhibits no inverted nipple, no mass, no nipple discharge, no skin change, no tenderness and no bleeding.        Abdominal: Soft. There is no abdominal tenderness.     Genitourinary:    Vagina, uterus, right adnexa and left adnexa normal.      Pelvic exam was performed with patient in the lithotomy position.   The external female genitalia was normal.   Genitalia hair distrobution normal .   There is no rash or lesion on the right labia. There is no rash or lesion on the left labia. Cervix is normal. Right adnexum displays no mass, no tenderness and no fullness. Left adnexum displays no mass, no tenderness and no fullness. No vaginal discharge, tenderness, bleeding, rectocele, cystocele or prolapse of vaginal walls in the vagina.    No foreign body in the vagina.   Cervix exhibits no motion tenderness, no lesion, no discharge, no friability, no tenderness and no polyp.    pap smear completedUterus is not enlarged and not tender. Normal urethral meatus.              Neurological: She is alert and oriented to person, place, and time.          PROCEDURES/ORDERS:  Pap      Assessment/Plan:    Encounter for well woman exam    Pap smear for cervical cancer screening  -     Liquid-Based Pap Smear, Screening  -     HPV High Risk Genotypes, PCR    Breast cancer screening by mammogram  -     Mammo Digital Screening Bil[facility] w/ Alexi; Future; Expected date: 08/13/2025        COUNSELING:  The patient was counseled today on:  -A.C.S. Pap and pelvic exam guidelines, recomendations for yearly mammogram, monthly self breast exams and to follow up with her PCP for other health maintenance.    FOLLOW-UP  annually for [employer].

## 2024-10-10 ENCOUNTER — OFFICE VISIT (OUTPATIENT)
Dept: DERMATOLOGY | Facility: CLINIC | Age: 49
End: 2024-10-10
Payer: COMMERCIAL

## 2024-10-10 DIAGNOSIS — D22.9 NEVUS: ICD-10-CM

## 2024-10-10 DIAGNOSIS — L81.1 MELASMA: ICD-10-CM

## 2024-10-10 DIAGNOSIS — L94.0 MORPHEA: ICD-10-CM

## 2024-10-10 DIAGNOSIS — L71.9 ROSACEA: ICD-10-CM

## 2024-10-10 DIAGNOSIS — L82.1 SK (SEBORRHEIC KERATOSIS): Primary | ICD-10-CM

## 2024-10-10 DIAGNOSIS — D17.0 LIPOMA OF SCALP: ICD-10-CM

## 2024-10-10 PROCEDURE — G2211 COMPLEX E/M VISIT ADD ON: HCPCS | Mod: S$GLB,,, | Performed by: DERMATOLOGY

## 2024-10-10 PROCEDURE — 99999 PR PBB SHADOW E&M-EST. PATIENT-LVL III: CPT | Mod: PBBFAC,,, | Performed by: DERMATOLOGY

## 2024-10-10 PROCEDURE — 99214 OFFICE O/P EST MOD 30 MIN: CPT | Mod: S$GLB,,, | Performed by: DERMATOLOGY

## 2024-10-10 RX ORDER — TRETINOIN 0.25 MG/G
CREAM TOPICAL
Qty: 30 G | Refills: 5 | Status: SHIPPED | OUTPATIENT
Start: 2024-10-10

## 2024-10-10 RX ORDER — SODIUM SULFACETAMIDE AND SULFUR 80; 40 MG/ML; MG/ML
SOLUTION TOPICAL
Qty: 473 ML | Refills: 3 | Status: SHIPPED | OUTPATIENT
Start: 2024-10-10

## 2024-10-10 NOTE — PATIENT INSTRUCTIONS
PM:  Wash with  sulfacleanser   Thin film of tretinoin/azaleic acid  all over every other to every night   Moisturize with cerave pm or vanicream daily moisturizer to minimize irritation    AM:  Wash with mild cleanser like vanicream daily facial cleanser or cerave hydrating cleanser  2. Vitamin C serum over face- Revision, Skin Medica, or OTC Cerave or LaRoche Posay Vitamin C serum  3. Moisturizer with spf 30+     A tint is recommended too- such as makeup, tinted sunscreen, BB/CC creams. The tint in products protects against agents other than UV light that damage our skin such as blue light from screens, infrared heat, visible light, and other  other environmental pollutants.      We would like to see you back in the clinic in 12 months.  You will be able to schedule this appointment by calling or by using your [facility] portal 3 months before this time. Because our schedule fills so quickly, please set a reminder in your phone or on your calendar to schedule 3 months before you are due to come in so that we can see you in a timely fashion.  You should also receive a reminder from us in the mail. This will help us ensure we can continue to provide excellent healthcare for you. Thank you.

## 2024-10-10 NOTE — PROGRESS NOTES
Subjective:      Patient ID:  Liliana Wright is a 49 y.o. female who presents for   Chief Complaint   Patient presents with    Skin Check     UBSE     Pt present today for UBSE.    Pt has no h/o of NMSC or MM.     Patient with new area of concern:   Location: Back   Previous treatments: none    Pt c.o redness and dark spots to face.  No current tx         Review of Systems   Skin:  Positive for daily sunscreen use, activity-related sunscreen use and wears hat. Negative for recent sunburn.   Hematologic/Lymphatic: Does not bruise/bleed easily.       Objective:   Physical Exam   Skin:   Areas Examined (abnormalities noted in diagram):   Scalp / Hair Palpated and Inspected  Head / Face Inspection Performed  Neck Inspection Performed  Chest / Axilla Inspection Performed  Abdomen Inspection Performed  Back Inspection Performed  RUE Inspected  LUE Inspection Performed                 Diagram Legend     Erythematous scaling macule/papule c/w actinic keratosis       Vascular papule c/w angioma      Pigmented verrucoid papule/plaque c/w seborrheic keratosis      Yellow umbilicated papule c/w sebaceous hyperplasia      Irregularly shaped tan macule c/w lentigo     1-2 mm smooth white papules consistent with Milia      Movable subcutaneous cyst with punctum c/w epidermal inclusion cyst      Subcutaneous movable cyst c/w pilar cyst      Firm pink to brown papule c/w dermatofibroma      Pedunculated fleshy papule(s) c/w skin tag(s)      Evenly pigmented macule c/w junctional nevus     Mildly variegated pigmented, slightly irregular-bordered macule c/w mildly atypical nevus      Flesh colored to evenly pigmented papule c/w intradermal nevus       Pink pearly papule/plaque c/w basal cell carcinoma      Erythematous hyperkeratotic cursted plaque c/w SCC      Surgical scar with no sign of skin cancer recurrence      Open and closed comedones      Inflammatory papules and pustules      Verrucoid papule consistent consistent with  wart     Erythematous eczematous patches and plaques     Dystrophic onycholytic nail with subungual debris c/w onychomycosis     Umbilicated papule    Erythematous-base heme-crusted tan verrucoid plaque consistent with inflamed seborrheic keratosis     Erythematous Silvery Scaling Plaque c/w Psoriasis     See annotation      Assessment / Plan:        SK (seborrheic keratosis)  These are benign inherited growths without a malignant potential. Reassurance given to patient. No treatment is necessary.     Lipoma of scalp/bony protuberance      Notes Recorded by Arianna Stoddard MD on 3/17/2016 at 4:31 PM  Please let pt know pathology report indicates a benign lesion, as noted above. Thank you.  [facility] DIAGNOSIS:  MID FRONTAL SCALP:  -HISTOLOGIC FINDINGS CONSISTENT WITH LIPOMA.    Lesion has been stable for over 10 years. Do think there is a component of bony protuberance. Non symptomatic   Monitor for pain or increase in size    Morphea  In remission  Post inflammatory changes  No new lesions.  Will continue to monitor    Rosacea  Melasma  -     sulfacetamide sodium-sulfur (SULFACLEANSE 8-4) 8-4 % Susp; Wash face qhs  Dispense: 473 mL; Refill: 3  -     tretinoin (RETIN-A) 0.025 % cream; Compound tretinoin 0.025% / azelaic acid 8% / niacinamide 2% cream. Apply a pea-sized amount to entire face qhs then moisturize.  Dispense: 30 g; Refill: 5  Notes Recorded by Arianna Stoddard MD on 3/17/2016 at 4:31 PM  Please let pt know pathology report indicates a benign lesion, as noted above. Thank you.  [facility] DIAGNOSIS:  MID FRONTAL SCALP:  -HISTOLOGIC FINDINGS CONSISTENT WITH LIPOMA.    Nevus  Discussed ABCDE's of nevi.  Monitor for new mole or moles that are becoming bigger, darker, irritated, or developing irregular borders. Brochure provided. Instructed patient to observe lesion(s) for changes and follow up in clinic if changes are noted. Patient to monitor skin at home for new or changing lesions.       Upper  body skin examination performed today including at least 6 points as noted in physical examination. No lesions suspicious for malignancy noted.    Recommend daily sun protection/avoidance and use of at least SPF 30, broad spectrum sunscreen (OTC drug).                Follow up in about 1 year (around 10/10/2025) for UBSE, Medication Management.

## 2025-03-05 ENCOUNTER — OFFICE VISIT (OUTPATIENT)
Dept: INTERNAL MEDICINE | Facility: CLINIC | Age: 50
End: 2025-03-05
Payer: COMMERCIAL

## 2025-03-05 VITALS
SYSTOLIC BLOOD PRESSURE: 120 MMHG | DIASTOLIC BLOOD PRESSURE: 82 MMHG | WEIGHT: 159.81 LBS | OXYGEN SATURATION: 100 % | BODY MASS INDEX: 31.38 KG/M2 | HEIGHT: 60 IN | HEART RATE: 92 BPM

## 2025-03-05 DIAGNOSIS — K59.00 CONSTIPATION, UNSPECIFIED CONSTIPATION TYPE: ICD-10-CM

## 2025-03-05 DIAGNOSIS — R70.0 ELEVATED SED RATE: ICD-10-CM

## 2025-03-05 DIAGNOSIS — Z00.00 ANNUAL PHYSICAL EXAM: ICD-10-CM

## 2025-03-05 DIAGNOSIS — F41.9 ANXIETY: ICD-10-CM

## 2025-03-05 DIAGNOSIS — H93.A9 PULSATILE TINNITUS: ICD-10-CM

## 2025-03-05 DIAGNOSIS — R59.0 POSTERIOR AURICULAR LYMPHADENOPATHY: Primary | ICD-10-CM

## 2025-03-05 DIAGNOSIS — R79.82 ELEVATED C-REACTIVE PROTEIN (CRP): ICD-10-CM

## 2025-03-05 DIAGNOSIS — L94.0 MORPHEA: ICD-10-CM

## 2025-03-05 DIAGNOSIS — J34.2 NASAL SEPTAL DEVIATION: ICD-10-CM

## 2025-03-05 PROCEDURE — 99999 PR PBB SHADOW E&M-EST. PATIENT-LVL IV: CPT | Mod: PBBFAC,,,

## 2025-03-05 RX ORDER — DOXYCYCLINE HYCLATE 100 MG
100 TABLET ORAL 2 TIMES DAILY
Qty: 10 TABLET | Refills: 0 | Status: SHIPPED | OUTPATIENT
Start: 2025-03-05 | End: 2025-03-10

## 2025-03-05 NOTE — PROGRESS NOTES
HPI     Chief Complaint:  Chief Complaint   Patient presents with    Edema       Liliana Wright is a 49 y.o. female with multiple medical diagnoses as listed in the medical history and problem list that presents for   Chief Complaint   Patient presents with    Edema   .   Patient is not known to me.      HPI    Lymph node swelling x 2 days - Walking parade Sunday with son and developed right ear pain after wearing earrings that were too tight. Now has swelling to lymph node behind ear with mild TTP.  Has issue with right ear already. Has pulsating sensation. Has seen ENT approx 1.5 years ago and unsure of dx. No ringing or decreased hearing. Pt does notice that if weather is funky will feel more pressure. Symptoms resolve when she sleep on that ear. Admits seasonal allergies. Unable to tolerate sudafed. Current no cough, cold congestion, ear pain or sore throat. No fever. Admits she has to wear mouth guard because of grinding at night but bothers pt    Hot flashes - taking OTC ostroven (natural) to help with hot flashes    Hemorrhoids - admits issue with hemorrhoids, sometimes blood when wiping due to constipation. Doesn't eat enough fiber. UTD on colonoscopy.     Other concerns below  Assessment & Plan       1. Posterior auricular lymphadenopathy  Mildly tender 1cm palpable post auricular lymph node with mild erythema  Doxy x 5 days  Return precautions    2. Annual physical exam  Fasting labs prior to annual with PCP in April Declines flu vaccine  -     Comprehensive Metabolic Panel; Future; Expected date: 04/05/2025  -     CBC Auto Differential; Future; Expected date: 04/05/2025  -     Hemoglobin A1C; Future; Expected date: 04/05/2025  -     Lipid Panel; Future; Expected date: 04/05/2025  -     TSH; Future; Expected date: 04/05/2025    3. Elevated sed rate  Future labs prior to annual. Continues to monitor trend.   Lab Results   Component Value Date    SEDRATE 32 (H) 01/08/2024       -     Sedimentation  rate; Future; Expected date: 04/05/2025    4. Elevated C-reactive protein (CRP)  Continue to monitor trend. Will place future labs.   Lab Results   Component Value Date    CRP 4.0 01/08/2024       -     C-REACTIVE PROTEIN; Future; Expected date: 04/05/2025    5. Pulsatile tinnitus  F/u with ENT, would like second opinion to discuss options. Will provide educaiton on mgmt/treatment of tinnitus.   Consider CBT? Could help with anxiety and tinnitus   -     Ambulatory referral/consult to ENT; Future; Expected date: 03/12/2025    6. Morphea  F/b derm. Stable, asymptomatic chronic condition.  Will continue to maximize risk factor reduction and adjust medication as needed    7. 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 Constipation, unspecified constipation type  Trial metamucil daily x 2 weeks  Increase fiber    Other orders  -     doxycycline (VIBRA-TABS) 100 MG tablet; Take 1 tablet (100 mg total) by mouth 2 (two) times daily. for 5 days  Dispense: 10 tablet; Refill: 0          --------------------------------------------      Health Maintenance:  Health Maintenance         Date Due Completion Date    Influenza Vaccine (1) 09/01/2024 4/13/2023 (Declined)    Override on 4/13/2023: Declined (Declined for season)    COVID-19 Vaccine (4 - 2024-25 season) 09/01/2024 11/15/2021    Mammogram 08/12/2025 8/12/2024    Hemoglobin A1c (Diabetic Prevention Screening) 01/08/2027 1/8/2024    Lipid Panel 01/08/2029 1/8/2024    TETANUS VACCINE 03/01/2029 3/1/2019 (Declined)    Override on 3/1/2019: Declined    Override on 3/23/2018: Declined    Override on 1/13/2017: Declined    Cervical Cancer Screening 10/01/2029 10/1/2024    Override on 8/7/2012: Done    Colorectal Cancer Screening 07/03/2030 7/3/2023    RSV Vaccine (Age 60+ and Pregnant patients) (1 - 1-dose 75+ series) 04/03/2052 ---            Health maintenance reviewed    Follow Up:  Follow up if symptoms worsen or fail to  improve.    Discussed DDx, condition, and treatment.   Education sent to patient portal/included in after visit summary.  ED precautions given.   Notify provider if symptoms do not resolve or increase in severity.   Patient verbalizes understanding and agrees with plan of care.      Exam     Review of Systems:  (as noted above)  Review of Systems    Physical Exam:   Physical Exam  Constitutional:       General: She is not in acute distress.     Appearance: Normal appearance. She is not ill-appearing, toxic-appearing or diaphoretic.   HENT:      Head: Normocephalic and atraumatic. No raccoon eyes.      Salivary Glands: Right salivary gland is not diffusely enlarged or tender. Left salivary gland is not diffusely enlarged or tender.      Right Ear: Ear canal and external ear normal. No decreased hearing noted. A middle ear effusion is present. Tympanic membrane is not erythematous or bulging.      Left Ear: Ear canal and external ear normal. No decreased hearing noted. A middle ear effusion is present. Tympanic membrane is not erythematous or bulging.      Nose: Septal deviation present. No congestion or rhinorrhea.      Right Turbinates: Not enlarged or swollen.      Left Turbinates: Enlarged and swollen. Not pale.   Eyes:      Conjunctiva/sclera: Conjunctivae normal.   Neck:      Thyroid: No thyroid mass, thyromegaly or thyroid tenderness.   Cardiovascular:      Rate and Rhythm: Normal rate and regular rhythm.      Pulses: Normal pulses.      Heart sounds: Normal heart sounds. No murmur heard.  Pulmonary:      Effort: Pulmonary effort is normal. No respiratory distress.      Breath sounds: Normal breath sounds. No wheezing.   Abdominal:      General: Bowel sounds are normal.      Palpations: Abdomen is soft. There is no hepatomegaly or mass.      Tenderness: There is no abdominal tenderness. There is no right CVA tenderness or left CVA tenderness. Negative signs include La's sign.   Musculoskeletal:          General: Normal range of motion.      Cervical back: Normal range of motion and neck supple. No rigidity.      Right lower leg: No edema.      Left lower leg: No edema.   Lymphadenopathy:      Head:      Right side of head: Posterior auricular adenopathy present. No submental, submandibular, tonsillar, preauricular or occipital adenopathy.      Left side of head: No submental, submandibular, tonsillar, preauricular, posterior auricular or occipital adenopathy.      Cervical: No cervical adenopathy.      Right cervical: No posterior cervical adenopathy.     Left cervical: No posterior cervical adenopathy.      Upper Body:      Right upper body: No supraclavicular adenopathy.      Left upper body: No supraclavicular adenopathy.   Skin:     General: Skin is warm.      Capillary Refill: Capillary refill takes less than 2 seconds.      Findings: No bruising.   Neurological:      General: No focal deficit present.      Mental Status: She is alert and oriented to person, place, and time.      Gait: Gait normal.   Psychiatric:         Mood and Affect: Mood normal.       Vitals:    03/05/25 0815   BP: 120/82   Pulse: 92   SpO2: 100%   Weight: 72.5 kg (159 lb 13.3 oz)   Height: 5' (1.524 m)      Body mass index is 31.22 kg/m².    Lab Results   Component Value Date    WBC 6.41 01/08/2024    HGB 12.5 01/08/2024    HCT 40.4 01/08/2024     01/08/2024    CHOL 208 (H) 01/08/2024    TRIG 202 (H) 01/08/2024    HDL 38 (L) 01/08/2024    ALT 22 01/08/2024    AST 14 01/08/2024     01/08/2024    K 3.9 01/08/2024     01/08/2024    CREATININE 0.8 01/08/2024    BUN 10 01/08/2024    CO2 23 01/08/2024    TSH 1.664 01/08/2024    HGBA1C 5.3 01/08/2024       The 10-year ASCVD risk score (Maria Esther ROLDAN, et al., 2019) is: 1.5%    Values used to calculate the score:      Age: 49 years      Sex: Female      Is Non- : No      Diabetic: No      Tobacco smoker: No      Systolic Blood Pressure: 120 mmHg      Is BP  treated: No      HDL Cholesterol: 38 mg/dL      Total Cholesterol: 208 mg/dL    (Imaging have been independently reviewed)    History     Past Medical History:  Past Medical History:   Diagnosis Date    Abnormal Pap smear     GEGE I on bx     Cervical high risk HPV (human papillomavirus) test positive     PCO (polycystic ovaries) 3/13/2014    Took Metformin during first two pregnancies, stopped before conception this pregnancy and does not want to take     Previous  section 3/13/2014    X 2, induction and FTP, 2nd preg PROM at 37wk with repeat     Rh negative status during pregnancy 3/14/2014    Rhogam at 28wk     Vegetarian 2017       Past Surgical History:  Past Surgical History:   Procedure Laterality Date     SECTION      X 2    CHOLECYSTECTOMY      COLONOSCOPY N/A 7/3/2023    Procedure: COLONOSCOPY;  Surgeon: Oscar Iyer MD;  Location: [facility] ENDO (2ND FLR);  Service: Endoscopy;  Laterality: N/A;  from referral DR. Scott / prep ins. on portal, Pt requested suprep/ 2nd flr Pt reports possible anesthesia complications - admitted post procedure due to hypotension  - ERW  23- Precall confirmed- [place]    DILATION AND CURETTAGE OF UTERUS  2014    missed AB     OVARIAN CYST REMOVAL      with c/s        Social History:  Social History[1]    Family History:  Family History   Problem Relation Name Age of Onset    No Known Problems Mother      Lupus Father No     Hypertension Father No     No Known Problems Sister      No Known Problems Brother      No Known Problems Daughter      No Known Problems Son      Colon cancer Paternal Aunt      Breast cancer Maternal Grandmother No 70    Cancer Maternal Grandmother No     Alopecia Neg Hx      Diabetes Neg Hx      Hypertension Neg Hx      Heart disease Neg Hx      Stroke Neg Hx      Acne Neg Hx      Eczema Neg Hx      Psoriasis Neg Hx      Melanoma Neg Hx      Ovarian cancer Neg Hx         Allergies and Medications: (updated and  reviewed)  Review of patient's allergies indicates:   Allergen Reactions    Robitussin [guaifenesin] Anaphylaxis and Palpitations    Sudafed cough     Pseudoephedrine hcl Palpitations     Current Medications[2]    Patient Care Team:  Jenna Scott MD as PCP - General (Internal Medicine)         - The patient is given an After Visit Summary that lists all medications with directions, allergies, education, orders placed during this encounter and follow-up instructions.      - I have reviewed the patient's medical information including past medical, family, and social history sections including the medications and allergies.      - We discussed the patient's current medications.     This note was created by combination of typed  and MModal dictation.  Transcription errors may be present.  If there are any questions, please contact me.                 [1]   Social History  Socioeconomic History    Marital status:    Occupational History     Employer: [employer]   Tobacco Use    Smoking status: Never     Passive exposure: Never    Smokeless tobacco: Never   Substance and Sexual Activity    Alcohol use: No    Drug use: No    Sexual activity: Yes     Partners: Male     Birth control/protection: None     Comment:  to [facility]    Other Topics Concern    Are you pregnant or think you may be? No    Breast-feeding No   Social History Narrative    From [place]    Moved to [facility] in [place]     Social Drivers of Health     Transportation Needs: No Transportation Needs (9/22/2020)    PRAPARE - Transportation     Lack of Transportation (Medical): No     Lack of Transportation (Non-Medical): No   Physical Activity: Unknown (9/22/2020)    Exercise Vital Sign     Days of Exercise per Week: 1 day   Stress: No Stress Concern Present (9/22/2020)    [employer] - Occupational Stress Questionnaire     Feeling of Stress : Only a little   [2]   Current Outpatient Medications    Medication Sig Dispense Refill    ergocalciferol, vitamin D2, (VITAMIN D ORAL) Take by mouth.      MAGNESIUM ORAL Take by mouth.      sulfacetamide sodium-sulfur (SULFACLEANSE 8-4) 8-4 % Susp Wash face qhs 473 mL 3    tretinoin (RETIN-A) 0.025 % cream Compound tretinoin 0.025% / azelaic acid 8% / niacinamide 2% cream. Apply a pea-sized amount to entire face qhs then moisturize. 30 g 5    doxycycline (VIBRA-TABS) 100 MG tablet Take 1 tablet (100 mg total) by mouth 2 (two) times daily. for 5 days 10 tablet 0     No current facility-administered medications for this visit.

## 2025-03-05 NOTE — PATIENT INSTRUCTIONS
Trial metamucil x 2 weeks and increase fiber/water intake    Complete Doxy, let me know if does not resolve after completion    Schedule with ENT    Schedule fasting labs prior to appt genoneliasa

## 2025-04-11 ENCOUNTER — OFFICE VISIT (OUTPATIENT)
Dept: OTOLARYNGOLOGY | Facility: CLINIC | Age: 50
End: 2025-04-11
Payer: COMMERCIAL

## 2025-04-11 VITALS — HEART RATE: 90 BPM | SYSTOLIC BLOOD PRESSURE: 116 MMHG | DIASTOLIC BLOOD PRESSURE: 65 MMHG

## 2025-04-11 DIAGNOSIS — H93.A9 PULSATILE TINNITUS: ICD-10-CM

## 2025-04-11 DIAGNOSIS — H93.A9 PULSATILE TINNITUS, UNSPECIFIED EAR: Primary | ICD-10-CM

## 2025-04-11 PROCEDURE — 99214 OFFICE O/P EST MOD 30 MIN: CPT | Mod: S$GLB,,, | Performed by: STUDENT IN AN ORGANIZED HEALTH CARE EDUCATION/TRAINING PROGRAM

## 2025-04-11 NOTE — PROGRESS NOTES
Ear, Nose, & Throat  Otolaryngology - Head & Neck Surgery      Subjective:     Chief Complaint:   Chief Complaint   Patient presents with    Tinnitus       Liliana Wright is a 50 y.o. female who was referred to me by Radha Lancaster in consultation for pulsatile tinnitus, 80.    1.5 years of right pulsatile tinnitus, AD.  Worsened by turning head to the right or sleeping on the left side.  She has recently changed jobs to a more demanding position.  She notices that the pulsatile tinnitus increases with anxiety.  Denies any headaches, vision changes, ataxia.  Does not have any significant cardiovascular disease.  No significant otologic history.  This previously was just present at night while sleeping; however, for the past few weeks it has been present throughout the day.  This is overall becoming more frequent and bothersome to her.    Past Medical History  Active Ambulatory Problems     Diagnosis Date Noted    PCO (polycystic ovaries) 2014    Morphea 2017    Vitamin D deficiency 2019    Anxiety 2019    Obesity (BMI 30.0-34.9) 2021    Smell or taste sensation disturbance 2023    Nasal septal deviation 2023    Pulsatile tinnitus of right ear 2023    BEAR (dyspnea on exertion) 2023    Chest pain, atypical 2023     Resolved Ambulatory Problems     Diagnosis Date Noted    Vulvar abscess 2013    Headache(784.0) 2013    Elderly multigravida, currently pregnant 2014    Previous  section 2014    History of abnormal Pap smear 2014    Rh negative status during pregnancy 2014    Unspecified  screening 2014    History of blood transfusion 2014    Elderly multigravida 2014    Subchorionic hemorrhage in first trimester 2014     in second trimester 10/31/2014     Past Medical History:   Diagnosis Date    Abnormal Pap smear     Cervical high risk HPV (human papillomavirus) test positive      Vegetarian 2017       Past Surgical History  She has a past surgical history that includes Cholecystectomy; Ovarian cyst removal; Dilation and curettage of uterus (2014);  section; and Colonoscopy (N/A, 7/3/2023).    Past Surgical History:   Procedure Laterality Date     SECTION      X 2    CHOLECYSTECTOMY      COLONOSCOPY N/A 7/3/2023    Procedure: COLONOSCOPY;  Surgeon: Oscar Iyer MD;  Location: [facility] (2ND FLR);  Service: Endoscopy;  Laterality: N/A;  from referral DR. Scott / prep ins. on portal, Pt requested suprep/ 2nd flr Pt reports possible anesthesia complications - admitted post procedure due to hypotension  - ERW  23- Precall confirmed- [place]    DILATION AND CURETTAGE OF UTERUS  2014    missed AB     OVARIAN CYST REMOVAL      with c/s         Family History  Her family history includes Breast cancer (age of onset: 70) in her maternal grandmother; Cancer in her maternal grandmother; Colon cancer in her paternal aunt; Hypertension in her father; Lupus in her father; No Known Problems in her brother, daughter, mother, sister, and son.    Social History  She reports that she has never smoked. She has never been exposed to tobacco smoke. She has never used smokeless tobacco. She reports that she does not drink alcohol and does not use drugs.    Allergies  She is allergic to robitussin [guaifenesin], sudafed cough, and pseudoephedrine hcl.    Medications  She has a current medication list which includes the following prescription(s): ergocalciferol (vitamin d2), magnesium, sulfacetamide sodium-sulfur, and tretinoin.    ROS:  Pertinent positive and negative review of systems as noted in HPI.     Objective:     /65 (BP Location: Left arm, Patient Position: Sitting)   Pulse 90    Physical Exam  Constitutional: Well appearing, communicating. No acute distress  Voice: Euphonic  Eyes: Conjunctiva WNL, Pupils reactive  Head/Face: House Brackmann I Bilaterally.  Right  Ear:    Auricle normally developed   EAC: normal   Tympanic membrane: intact   Middle Ear: No effusion present and [place] in normal position  Left Ear:    Auricle normally developed   EAC: normal   Tympanic membrane: intact   Middle Ear: No effusion present and [place] in normal position  Vestibular:    Spontaneous Nystagmus: none  Neuro/Psychiatric:     Affect: Appropriate   Eyes: EOMI intact  Respiratory: No increased WOB, no stridor     Data Review:   LABS      IMAGING    No pertinent imaging available    AUDIO    Procedures:       Assessment:     1. Pulsatile tinnitus      Plan:     I had a long discussion with the patient regarding her condition and the further workup and management options.  We will get a CT temporal bone to evaluate for diverticulum, sigmoid plate thinning, high-riding jugular bulb, or middle ear pathology.  RTC following scan.    Voice recognition software was used in the creation of this note/communication and any sound-alike errors which may have occurred from its use should be taken in context when interpreting. If such errors prevent a clear understanding of the note/communication, please contact the office for clarification.     No orders of the defined types were placed in this encounter.

## 2025-04-25 ENCOUNTER — PATIENT MESSAGE (OUTPATIENT)
Dept: DERMATOLOGY | Facility: CLINIC | Age: 50
End: 2025-04-25
Payer: COMMERCIAL

## 2025-04-25 DIAGNOSIS — L81.1 MELASMA: ICD-10-CM

## 2025-04-25 DIAGNOSIS — L71.9 ROSACEA: ICD-10-CM

## 2025-04-28 RX ORDER — TRETINOIN 0.25 MG/G
CREAM TOPICAL
Qty: 30 G | Refills: 5 | Status: SHIPPED | OUTPATIENT
Start: 2025-04-28

## 2025-08-07 ENCOUNTER — OFFICE VISIT (OUTPATIENT)
Dept: INTERNAL MEDICINE | Facility: CLINIC | Age: 50
End: 2025-08-07
Payer: COMMERCIAL

## 2025-08-07 ENCOUNTER — PATIENT MESSAGE (OUTPATIENT)
Dept: INTERNAL MEDICINE | Facility: CLINIC | Age: 50
End: 2025-08-07

## 2025-08-07 VITALS
SYSTOLIC BLOOD PRESSURE: 139 MMHG | HEART RATE: 79 BPM | WEIGHT: 171.31 LBS | BODY MASS INDEX: 33.63 KG/M2 | DIASTOLIC BLOOD PRESSURE: 80 MMHG | OXYGEN SATURATION: 100 % | HEIGHT: 60 IN

## 2025-08-07 DIAGNOSIS — N64.52 NIPPLE DISCHARGE, BLOODY: Primary | ICD-10-CM

## 2025-08-07 LAB
B-HCG UR QL: NEGATIVE
CTP QC/QA: YES

## 2025-08-07 PROCEDURE — 81025 URINE PREGNANCY TEST: CPT | Mod: S$GLB,,,

## 2025-08-07 PROCEDURE — 99214 OFFICE O/P EST MOD 30 MIN: CPT | Mod: S$GLB,,,

## 2025-08-07 PROCEDURE — 99999 PR PBB SHADOW E&M-EST. PATIENT-LVL III: CPT | Mod: PBBFAC,,,
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
## 2025-08-14 ENCOUNTER — OFFICE VISIT (OUTPATIENT)
Dept: INTERNAL MEDICINE | Facility: CLINIC | Age: 50
End: 2025-08-14
Payer: COMMERCIAL

## 2025-08-14 ENCOUNTER — PATIENT MESSAGE (OUTPATIENT)
Dept: INTERNAL MEDICINE | Facility: CLINIC | Age: 50
End: 2025-08-14

## 2025-08-14 VITALS
BODY MASS INDEX: 33.98 KG/M2 | HEIGHT: 60 IN | DIASTOLIC BLOOD PRESSURE: 88 MMHG | WEIGHT: 173.06 LBS | HEART RATE: 98 BPM | SYSTOLIC BLOOD PRESSURE: 122 MMHG | OXYGEN SATURATION: 98 %

## 2025-08-14 DIAGNOSIS — R42 VERTIGO: ICD-10-CM

## 2025-08-14 DIAGNOSIS — F41.9 ANXIETY: Primary | ICD-10-CM

## 2025-08-14 PROCEDURE — 99999 PR PBB SHADOW E&M-EST. PATIENT-LVL IV: CPT | Mod: PBBFAC,,,

## 2025-08-14 PROCEDURE — 99214 OFFICE O/P EST MOD 30 MIN: CPT | Mod: S$GLB,,,

## 2025-08-14 RX ORDER — MECLIZINE HYDROCHLORIDE 25 MG/1
25 TABLET ORAL 3 TIMES DAILY PRN
Qty: 30 TABLET | Refills: 0 | Status: SHIPPED | OUTPATIENT
Start: 2025-08-14

## 2025-08-14 RX ORDER — PROPRANOLOL HYDROCHLORIDE 10 MG/1
10 TABLET ORAL 3 TIMES DAILY PRN
Qty: 60 TABLET | Refills: 0 | Status: SHIPPED | OUTPATIENT
Start: 2025-08-14 | End: 2026-08-14

## 2025-08-18 ENCOUNTER — HOSPITAL ENCOUNTER (EMERGENCY)
Facility: OTHER | Age: 50
Discharge: HOME | End: 2025-08-18
Attending: EMERGENCY MEDICINE
Payer: COMMERCIAL

## 2025-08-18 VITALS
HEART RATE: 83 BPM | DIASTOLIC BLOOD PRESSURE: 78 MMHG | HEIGHT: 61 IN | TEMPERATURE: 98 F | WEIGHT: 171 LBS | RESPIRATION RATE: 15 BRPM | SYSTOLIC BLOOD PRESSURE: 125 MMHG | BODY MASS INDEX: 32.28 KG/M2 | OXYGEN SATURATION: 100 %

## 2025-08-18 DIAGNOSIS — N64.52 NIPPLE DISCHARGE, BLOODY: Primary | ICD-10-CM

## 2025-08-18 DIAGNOSIS — F41.9 ANXIETY: ICD-10-CM

## 2025-08-18 DIAGNOSIS — R07.9 CHEST PAIN: ICD-10-CM

## 2025-08-18 LAB
ABSOLUTE EOSINOPHIL (OHS): 0.02 K/UL
ABSOLUTE MONOCYTE (OHS): 0.17 K/UL (ref 0.3–1)
ABSOLUTE NEUTROPHIL COUNT (OHS): 3.57 K/UL (ref 1.8–7.7)
ANION GAP (OHS): 9 MMOL/L (ref 8–16)
B-HCG UR QL: NEGATIVE
BASOPHILS # BLD AUTO: 0.04 K/UL
BASOPHILS NFR BLD AUTO: 0.7 %
BILIRUB UR QL STRIP.AUTO: NEGATIVE
BUN SERPL-MCNC: 9 MG/DL (ref 6–20)
CALCIUM SERPL-MCNC: 9.1 MG/DL (ref 8.7–10.5)
CHLORIDE SERPL-SCNC: 104 MMOL/L (ref 95–110)
CLARITY UR: CLEAR
CO2 SERPL-SCNC: 21 MMOL/L (ref 23–29)
COLOR UR AUTO: COLORLESS
CREAT SERPL-MCNC: 0.6 MG/DL (ref 0.5–1.4)
CTP QC/QA: YES
ERYTHROCYTE [DISTWIDTH] IN BLOOD BY AUTOMATED COUNT: 13.7 % (ref 11.5–14.5)
GFR SERPLBLD CREATININE-BSD FMLA CKD-EPI: >60 ML/MIN/1.73/M2
GLUCOSE SERPL-MCNC: 111 MG/DL (ref 70–110)
GLUCOSE UR QL STRIP: NEGATIVE
HCT VFR BLD AUTO: 33.7 % (ref 37–48.5)
HCV AB SERPL QL IA: NORMAL
HGB BLD-MCNC: 11.4 GM/DL (ref 12–16)
HGB UR QL STRIP: NEGATIVE
HIV 1+2 AB+HIV1 P24 AG SERPL QL IA: NORMAL
HOLD SPECIMEN: NORMAL
IMM GRANULOCYTES # BLD AUTO: 0.01 K/UL (ref 0–0.04)
IMM GRANULOCYTES NFR BLD AUTO: 0.2 % (ref 0–0.5)
KETONES UR QL STRIP: NEGATIVE
LEUKOCYTE ESTERASE UR QL STRIP: NEGATIVE
LYMPHOCYTES # BLD AUTO: 1.62 K/UL (ref 1–4.8)
MCH RBC QN AUTO: 27.5 PG (ref 27–31)
MCHC RBC AUTO-ENTMCNC: 33.8 G/DL (ref 32–36)
MCV RBC AUTO: 81 FL (ref 82–98)
NITRITE UR QL STRIP: NEGATIVE
NUCLEATED RBC (/100WBC) (OHS): 0 /100 WBC
OHS QRS DURATION: 86 MS
OHS QTC CALCULATION: 459 MS
PH UR STRIP: 6 [PH]
PLATELET # BLD AUTO: 224 K/UL (ref 150–450)
PMV BLD AUTO: 10.2 FL (ref 9.2–12.9)
POTASSIUM SERPL-SCNC: 3.7 MMOL/L (ref 3.5–5.1)
PROT UR QL STRIP: NEGATIVE
RBC # BLD AUTO: 4.14 M/UL (ref 4–5.4)
RELATIVE EOSINOPHIL (OHS): 0.4 %
RELATIVE LYMPHOCYTE (OHS): 29.8 % (ref 18–48)
RELATIVE MONOCYTE (OHS): 3.1 % (ref 4–15)
RELATIVE NEUTROPHIL (OHS): 65.8 % (ref 38–73)
SODIUM SERPL-SCNC: 134 MMOL/L (ref 136–145)
SP GR UR STRIP: <1.005
UROBILINOGEN UR STRIP-ACNC: NEGATIVE EU/DL
WBC # BLD AUTO: 5.43 K/UL (ref 3.9–12.7)

## 2025-08-18 PROCEDURE — 86803 HEPATITIS C AB TEST: CPT | Performed by: EMERGENCY MEDICINE

## 2025-08-18 PROCEDURE — 85025 COMPLETE CBC W/AUTO DIFF WBC: CPT | Performed by: EMERGENCY MEDICINE

## 2025-08-18 PROCEDURE — 93010 ELECTROCARDIOGRAM REPORT: CPT | Mod: ,,, | Performed by: INTERNAL MEDICINE

## 2025-08-18 PROCEDURE — 93005 ELECTROCARDIOGRAM TRACING: CPT

## 2025-08-18 PROCEDURE — 81003 URINALYSIS AUTO W/O SCOPE: CPT | Performed by: EMERGENCY MEDICINE

## 2025-08-18 PROCEDURE — 99284 EMERGENCY DEPT VISIT MOD MDM: CPT | Mod: 25

## 2025-08-18 PROCEDURE — 87389 HIV-1 AG W/HIV-1&-2 AB AG IA: CPT | Performed by: EMERGENCY MEDICINE

## 2025-08-18 PROCEDURE — 80048 BASIC METABOLIC PNL TOTAL CA: CPT | Performed by: EMERGENCY MEDICINE

## 2025-08-18 PROCEDURE — 25000003 PHARM REV CODE 250: Performed by: EMERGENCY MEDICINE

## 2025-08-18 PROCEDURE — 81025 URINE PREGNANCY TEST: CPT | Performed by: EMERGENCY MEDICINE

## 2025-08-18 RX ORDER — PROPRANOLOL HYDROCHLORIDE 10 MG/1
10 TABLET ORAL
Status: COMPLETED | OUTPATIENT
Start: 2025-08-18 | End: 2025-08-18

## 2025-08-18 RX ADMIN — PROPRANOLOL HYDROCHLORIDE 10 MG: 10 TABLET ORAL at 08:08

## 2025-08-19 LAB — HOLD SPECIMEN: NORMAL

## 2025-08-20 LAB — HOLD SPECIMEN: 0

## 2025-08-22 ENCOUNTER — HOSPITAL ENCOUNTER (OUTPATIENT)
Dept: RADIOLOGY | Facility: HOSPITAL | Age: 50
Discharge: HOME | End: 2025-08-22
Attending: FAMILY MEDICINE
Payer: COMMERCIAL

## 2025-08-22 DIAGNOSIS — Z12.31 BREAST CANCER SCREENING BY MAMMOGRAM: ICD-10-CM

## 2025-08-22 PROCEDURE — 77063 BREAST TOMOSYNTHESIS BI: CPT | Mod: TC

## 2025-08-22 PROCEDURE — 77063 BREAST TOMOSYNTHESIS BI: CPT | Mod: 26,,, | Performed by: RADIOLOGY

## 2025-08-22 PROCEDURE — 77067 SCR MAMMO BI INCL CAD: CPT | Mod: 26,,, | Performed by: RADIOLOGY

## 2025-09-01 DIAGNOSIS — F41.9 ANXIETY: ICD-10-CM

## 2025-09-02 RX ORDER — PROPRANOLOL HYDROCHLORIDE 10 MG/1
TABLET ORAL
Qty: 60 TABLET | Refills: 0 | Status: SHIPPED | OUTPATIENT
Start: 2025-09-02